# Patient Record
Sex: MALE | Race: WHITE | NOT HISPANIC OR LATINO | Employment: OTHER | URBAN - METROPOLITAN AREA
[De-identification: names, ages, dates, MRNs, and addresses within clinical notes are randomized per-mention and may not be internally consistent; named-entity substitution may affect disease eponyms.]

---

## 2023-04-03 LAB — PROSTATE SPECIFIC AG (NG/ML) IN SER/PLAS: <0.1 NG/ML (ref 0–4)

## 2023-06-21 ENCOUNTER — OFFICE VISIT (OUTPATIENT)
Dept: PRIMARY CARE | Facility: CLINIC | Age: 83
End: 2023-06-21
Payer: MEDICARE

## 2023-06-21 VITALS
SYSTOLIC BLOOD PRESSURE: 116 MMHG | BODY MASS INDEX: 22.73 KG/M2 | HEART RATE: 52 BPM | DIASTOLIC BLOOD PRESSURE: 68 MMHG | WEIGHT: 163 LBS

## 2023-06-21 DIAGNOSIS — I48.91 ATRIAL FIBRILLATION, UNSPECIFIED TYPE (MULTI): ICD-10-CM

## 2023-06-21 DIAGNOSIS — E85.4 CARDIAC AMYLOIDOSIS (MULTI): ICD-10-CM

## 2023-06-21 DIAGNOSIS — C61 PROSTATE CANCER (MULTI): ICD-10-CM

## 2023-06-21 DIAGNOSIS — D47.2 MGUS (MONOCLONAL GAMMOPATHY OF UNKNOWN SIGNIFICANCE): ICD-10-CM

## 2023-06-21 DIAGNOSIS — I43 CARDIAC AMYLOIDOSIS (MULTI): ICD-10-CM

## 2023-06-21 DIAGNOSIS — G56.00 CARPAL TUNNEL SYNDROME, UNSPECIFIED LATERALITY: ICD-10-CM

## 2023-06-21 DIAGNOSIS — E78.2 MIXED HYPERLIPIDEMIA: ICD-10-CM

## 2023-06-21 DIAGNOSIS — Z12.11 SCREENING FOR COLON CANCER: ICD-10-CM

## 2023-06-21 DIAGNOSIS — L97.909 VENOUS STASIS ULCER WITH VARICOSE VEINS OF LOWER EXTREMITY (MULTI): Primary | ICD-10-CM

## 2023-06-21 DIAGNOSIS — I83.009 VENOUS STASIS ULCER WITH VARICOSE VEINS OF LOWER EXTREMITY (MULTI): Primary | ICD-10-CM

## 2023-06-21 PROBLEM — N40.0 BENIGN ENLARGEMENT OF PROSTATE: Status: ACTIVE | Noted: 2023-06-21

## 2023-06-21 PROBLEM — E78.5 HYPERLIPEMIA: Status: ACTIVE | Noted: 2023-06-21

## 2023-06-21 PROCEDURE — 1160F RVW MEDS BY RX/DR IN RCRD: CPT | Performed by: INTERNAL MEDICINE

## 2023-06-21 PROCEDURE — 1170F FXNL STATUS ASSESSED: CPT | Performed by: INTERNAL MEDICINE

## 2023-06-21 PROCEDURE — G0439 PPPS, SUBSEQ VISIT: HCPCS | Performed by: INTERNAL MEDICINE

## 2023-06-21 PROCEDURE — 99214 OFFICE O/P EST MOD 30 MIN: CPT | Performed by: INTERNAL MEDICINE

## 2023-06-21 PROCEDURE — 1036F TOBACCO NON-USER: CPT | Performed by: INTERNAL MEDICINE

## 2023-06-21 PROCEDURE — 1159F MED LIST DOCD IN RCRD: CPT | Performed by: INTERNAL MEDICINE

## 2023-06-21 RX ORDER — TORSEMIDE 20 MG/1
10 TABLET ORAL
COMMUNITY
Start: 2023-05-15

## 2023-06-21 RX ORDER — SPIRONOLACTONE 25 MG/1
25 TABLET ORAL
COMMUNITY
Start: 2023-04-25

## 2023-06-21 RX ORDER — DOFETILIDE 0.12 MG/1
3 CAPSULE ORAL EVERY 12 HOURS
COMMUNITY
Start: 2021-06-22 | End: 2023-11-15 | Stop reason: ALTCHOICE

## 2023-06-21 ASSESSMENT — ENCOUNTER SYMPTOMS
NAUSEA: 0
SINUS PAIN: 0
FACIAL SWELLING: 0
WHEEZING: 0
SORE THROAT: 0
COUGH: 0
FLANK PAIN: 0
DIARRHEA: 0
NECK STIFFNESS: 0
HEADACHES: 0
ABDOMINAL DISTENTION: 0
SEIZURES: 0
CONSTIPATION: 0
TREMORS: 0
LIGHT-HEADEDNESS: 0
ANAL BLEEDING: 0
EYE ITCHING: 0
NUMBNESS: 0
COLOR CHANGE: 0
BACK PAIN: 0
PALPITATIONS: 0
VOMITING: 0
RHINORRHEA: 0
MYALGIAS: 0
WEAKNESS: 0
DIFFICULTY URINATING: 0
SPEECH DIFFICULTY: 0
FREQUENCY: 0
FACIAL ASYMMETRY: 0
SINUS PRESSURE: 0
BRUISES/BLEEDS EASILY: 1
ARTHRALGIAS: 0
PHOTOPHOBIA: 0
POLYPHAGIA: 0
ACTIVITY CHANGE: 0
BLOOD IN STOOL: 0
WOUND: 0
SLEEP DISTURBANCE: 0
EYE DISCHARGE: 0
TROUBLE SWALLOWING: 0
APPETITE CHANGE: 0
POLYDIPSIA: 0
HEMATURIA: 0
STRIDOR: 0
DYSURIA: 0
CHILLS: 0
JOINT SWELLING: 0
FATIGUE: 0
DIAPHORESIS: 0
RECTAL PAIN: 0
CHOKING: 0
ADENOPATHY: 0
ABDOMINAL PAIN: 0
VOICE CHANGE: 0
NECK PAIN: 0
EYE PAIN: 0
EYE REDNESS: 0
SHORTNESS OF BREATH: 1
DIZZINESS: 0
CHEST TIGHTNESS: 0

## 2023-06-21 ASSESSMENT — PATIENT HEALTH QUESTIONNAIRE - PHQ9
2. FEELING DOWN, DEPRESSED OR HOPELESS: NOT AT ALL
SUM OF ALL RESPONSES TO PHQ9 QUESTIONS 1 AND 2: 0
1. LITTLE INTEREST OR PLEASURE IN DOING THINGS: NOT AT ALL

## 2023-06-21 ASSESSMENT — ACTIVITIES OF DAILY LIVING (ADL)
GROCERY_SHOPPING: INDEPENDENT
MANAGING_FINANCES: INDEPENDENT
BATHING: INDEPENDENT
DOING_HOUSEWORK: INDEPENDENT
DRESSING: INDEPENDENT
TAKING_MEDICATION: INDEPENDENT

## 2023-06-21 NOTE — PROGRESS NOTES
Subjective   Patient ID: Kerwin Stark is a 82 y.o. male who presents for Medicare Annual Wellness Visit Subsequent.    Patient presents for wellness exam and follow-up.  He has been compliant with his medications, diet and exercise.  He is following with [facility] for amyloid.  He was started on diuretics for some lower extremity swelling.  He denies any headaches, no dizziness, no sinus problems, no chest pain but reports occasional dyspnea on exertion.  Denies abdominal pain no nausea vomiting or diarrhea.  He does complain of easy bruising.  He has had some mild lower extremity swelling.         Review of Systems   Constitutional:  Negative for activity change, appetite change, chills, diaphoresis and fatigue.   HENT:  Negative for congestion, dental problem, drooling, ear discharge, ear pain, facial swelling, hearing loss, mouth sores, nosebleeds, postnasal drip, rhinorrhea, sinus pressure, sinus pain, sneezing, sore throat, tinnitus, trouble swallowing and voice change.    Eyes:  Negative for photophobia, pain, discharge, redness, itching and visual disturbance.   Respiratory:  Positive for shortness of breath. Negative for cough, choking, chest tightness, wheezing and stridor.    Cardiovascular:  Negative for chest pain, palpitations and leg swelling.   Gastrointestinal:  Negative for abdominal distention, abdominal pain, anal bleeding, blood in stool, constipation, diarrhea, nausea, rectal pain and vomiting.   Endocrine: Negative for cold intolerance, heat intolerance, polydipsia, polyphagia and polyuria.   Genitourinary:  Negative for decreased urine volume, difficulty urinating, dysuria, enuresis, flank pain, frequency, genital sores, hematuria and urgency.   Musculoskeletal:  Negative for arthralgias, back pain, gait problem, joint swelling, myalgias, neck pain and neck stiffness.   Skin:  Negative for color change, pallor, rash and wound.   Neurological:  Negative for dizziness, tremors, seizures,  syncope, facial asymmetry, speech difficulty, weakness, light-headedness, numbness and headaches.   Hematological:  Negative for adenopathy. Bruises/bleeds easily.   Psychiatric/Behavioral:  Negative for sleep disturbance.        Objective   /68   Pulse 52   Wt 73.9 kg (163 lb)   BMI 22.73 kg/m²     Physical Exam  Constitutional:       Appearance: Normal appearance.   Cardiovascular:      Rate and Rhythm: Normal rate and regular rhythm.      Heart sounds: No murmur heard.     No gallop.   Pulmonary:      Effort: No respiratory distress.      Breath sounds: No wheezing or rales.   Abdominal:      General: There is no distension.      Palpations: There is no mass.      Tenderness: There is no abdominal tenderness. There is no guarding.   Musculoskeletal:      Right lower leg: No edema.      Left lower leg: No edema.      Comments: Varicose veins   Neurological:      Mental Status: He is alert.         Assessment/Plan   Diagnoses and all orders for this visit:  Venous stasis ulcer with varicose veins of lower extremity (CMS/HCC)-stable symptoms  Prostate cancer (CMS/HCC)-follow-up with urology  Screening for colon cancer  -     Cologuard® colon cancer screening; Future  MGUS (monoclonal gammopathy of unknown significance)-follow-up with hematology  Mixed hyperlipidemia--lipid profile at next visit  Atrial fibrillation, unspecified type (CMS/HCC)--rate is controlled.  We will continue with anticoagulation  Carpal tunnel syndrome, unspecified laterality  Cardiac amyloidosis (CMS/HCC)-follow-up with cardiology at [facility].  Health maintenance--he does not want a colonoscopy.  We will send Cologuard.  Immunizations are up-to-date.  Ophthalmology and dental have been done

## 2023-07-10 LAB — NONINV COLON CA DNA+OCC BLD SCRN STL QL: NEGATIVE

## 2023-07-13 DIAGNOSIS — I43 CARDIAC AMYLOIDOSIS (MULTI): ICD-10-CM

## 2023-07-13 DIAGNOSIS — E85.4 CARDIAC AMYLOIDOSIS (MULTI): ICD-10-CM

## 2023-07-18 RX ORDER — RIVAROXABAN 20 MG/1
TABLET, FILM COATED ORAL
Qty: 90 TABLET | Refills: 3 | Status: SHIPPED | OUTPATIENT
Start: 2023-07-18 | End: 2024-04-01

## 2023-07-24 ENCOUNTER — PATIENT OUTREACH (OUTPATIENT)
Dept: CARE COORDINATION | Facility: CLINIC | Age: 83
End: 2023-07-24
Payer: MEDICARE

## 2023-07-24 NOTE — PROGRESS NOTES
Spoke with patient. Patient identified by name and .     Asked patient [facility] patient experience questions. Patient states there were no concerns and that he was very pleased with his visit. All areas were addressed. Patient had no concerns.     Patient has follow up scheduled. He asked to verifying the visit type is correct. I informed patient I will check with MA and notify him either way.

## 2023-08-18 ENCOUNTER — APPOINTMENT (OUTPATIENT)
Dept: PRIMARY CARE | Facility: CLINIC | Age: 83
End: 2023-08-18
Payer: MEDICARE

## 2023-08-30 ENCOUNTER — OFFICE VISIT (OUTPATIENT)
Dept: PRIMARY CARE | Facility: CLINIC | Age: 83
End: 2023-08-30
Payer: MEDICARE

## 2023-08-30 VITALS
DIASTOLIC BLOOD PRESSURE: 74 MMHG | SYSTOLIC BLOOD PRESSURE: 122 MMHG | HEART RATE: 52 BPM | BODY MASS INDEX: 22.94 KG/M2 | WEIGHT: 164.5 LBS

## 2023-08-30 DIAGNOSIS — I43 CARDIAC AMYLOIDOSIS (MULTI): ICD-10-CM

## 2023-08-30 DIAGNOSIS — R35.0 BENIGN PROSTATIC HYPERPLASIA WITH URINARY FREQUENCY: ICD-10-CM

## 2023-08-30 DIAGNOSIS — R05.9 COUGH, UNSPECIFIED TYPE: ICD-10-CM

## 2023-08-30 DIAGNOSIS — E85.4 CARDIAC AMYLOIDOSIS (MULTI): ICD-10-CM

## 2023-08-30 DIAGNOSIS — C61 PROSTATE CANCER (MULTI): ICD-10-CM

## 2023-08-30 DIAGNOSIS — E78.2 MIXED HYPERLIPIDEMIA: Primary | ICD-10-CM

## 2023-08-30 DIAGNOSIS — D47.2 MGUS (MONOCLONAL GAMMOPATHY OF UNKNOWN SIGNIFICANCE): ICD-10-CM

## 2023-08-30 DIAGNOSIS — I48.91 ATRIAL FIBRILLATION, UNSPECIFIED TYPE (MULTI): ICD-10-CM

## 2023-08-30 DIAGNOSIS — N40.1 BENIGN PROSTATIC HYPERPLASIA WITH URINARY FREQUENCY: ICD-10-CM

## 2023-08-30 PROCEDURE — 1160F RVW MEDS BY RX/DR IN RCRD: CPT | Performed by: INTERNAL MEDICINE

## 2023-08-30 PROCEDURE — 1159F MED LIST DOCD IN RCRD: CPT | Performed by: INTERNAL MEDICINE

## 2023-08-30 PROCEDURE — 1036F TOBACCO NON-USER: CPT | Performed by: INTERNAL MEDICINE

## 2023-08-30 PROCEDURE — 99397 PER PM REEVAL EST PAT 65+ YR: CPT | Performed by: INTERNAL MEDICINE

## 2023-08-30 PROCEDURE — 1126F AMNT PAIN NOTED NONE PRSNT: CPT | Performed by: INTERNAL MEDICINE

## 2023-08-30 SDOH — HEALTH STABILITY: PHYSICAL HEALTH: ON AVERAGE, HOW MANY DAYS PER WEEK DO YOU ENGAGE IN MODERATE TO STRENUOUS EXERCISE (LIKE A BRISK WALK)?: 7 DAYS

## 2023-08-30 SDOH — HEALTH STABILITY: PHYSICAL HEALTH: ON AVERAGE, HOW MANY MINUTES DO YOU ENGAGE IN EXERCISE AT THIS LEVEL?: 60 MIN

## 2023-08-30 ASSESSMENT — ENCOUNTER SYMPTOMS
BRUISES/BLEEDS EASILY: 0
TREMORS: 0
VOMITING: 0
NECK PAIN: 0
RECTAL PAIN: 0
DIZZINESS: 0
FLANK PAIN: 0
MYALGIAS: 0
WHEEZING: 0
EYE ITCHING: 0
NUMBNESS: 0
PALPITATIONS: 0
JOINT SWELLING: 0
NAUSEA: 0
FREQUENCY: 1
DIARRHEA: 0
HEMATURIA: 0
COLOR CHANGE: 0
DIAPHORESIS: 0
CONSTIPATION: 0
TROUBLE SWALLOWING: 0
CHILLS: 0
SHORTNESS OF BREATH: 0
WOUND: 0
COUGH: 1
ADENOPATHY: 0
APPETITE CHANGE: 0
VOICE CHANGE: 0
STRIDOR: 1
ANAL BLEEDING: 0
EYE DISCHARGE: 0
ROS SKIN COMMENTS: BRUISING
SINUS PAIN: 0
EYE REDNESS: 0
FATIGUE: 0
SLEEP DISTURBANCE: 0
ABDOMINAL DISTENTION: 0
BLOOD IN STOOL: 0
BACK PAIN: 0
SINUS PRESSURE: 0
DYSURIA: 0
HEADACHES: 0
SEIZURES: 0
POLYDIPSIA: 0
LIGHT-HEADEDNESS: 0
ABDOMINAL PAIN: 0
ARTHRALGIAS: 0
PHOTOPHOBIA: 0
NECK STIFFNESS: 0
DIFFICULTY URINATING: 0
EYE PAIN: 0
CHOKING: 0
CHEST TIGHTNESS: 0
FACIAL SWELLING: 0
POLYPHAGIA: 0
WEAKNESS: 0
FACIAL ASYMMETRY: 0
SORE THROAT: 0
SPEECH DIFFICULTY: 0
RHINORRHEA: 0

## 2023-08-30 ASSESSMENT — LIFESTYLE VARIABLES
AUDIT-C TOTAL SCORE: 0
HOW OFTEN DO YOU HAVE A DRINK CONTAINING ALCOHOL: NEVER
SKIP TO QUESTIONS 9-10: 1
HOW OFTEN DO YOU HAVE SIX OR MORE DRINKS ON ONE OCCASION: NEVER
HOW MANY STANDARD DRINKS CONTAINING ALCOHOL DO YOU HAVE ON A TYPICAL DAY: PATIENT DOES NOT DRINK

## 2023-08-30 ASSESSMENT — PATIENT HEALTH QUESTIONNAIRE - PHQ9
SUM OF ALL RESPONSES TO PHQ9 QUESTIONS 1 AND 2: 0
2. FEELING DOWN, DEPRESSED OR HOPELESS: NOT AT ALL
1. LITTLE INTEREST OR PLEASURE IN DOING THINGS: NOT AT ALL

## 2023-08-30 NOTE — PATIENT INSTRUCTIONS
Please take medication as prescribed.  Follow-up in 6 months.  Schedule appointment with urology and hematology.  Obtain a chest x-ray.

## 2023-08-30 NOTE — PROGRESS NOTES
Subjective   Patient ID: Kerwin Stark is a 83 y.o. male who presents for PHYSICAL.    Patient presents for physical exam.  He has been compliant with his medications, diet and exercise.  He overall feels great.  His energy level is improved.  His dyspnea improved.  He is able to exercise and play golf without much difficulty.  He has been complaining of a nonproductive cough over the past few weeks.  Denies any fever or chills.  He denies any headaches, no dizziness, no sinus problems, no chest pain or palpitations.  He denies abdominal pain no nausea vomiting or diarrhea.  He continues to complain of left hand and left elbow pain.  There have been no more falls.  He continues to complain of easy bruising.         Review of Systems   Constitutional:  Negative for appetite change, chills, diaphoresis and fatigue.   HENT:  Negative for congestion, dental problem, drooling, ear discharge, ear pain, facial swelling, hearing loss, mouth sores, nosebleeds, postnasal drip, rhinorrhea, sinus pressure, sinus pain, sneezing, sore throat, tinnitus, trouble swallowing and voice change.    Eyes:  Negative for photophobia, pain, discharge, redness, itching and visual disturbance.   Respiratory:  Positive for cough and stridor. Negative for choking, chest tightness, shortness of breath and wheezing.    Cardiovascular:  Negative for chest pain, palpitations and leg swelling.   Gastrointestinal:  Negative for abdominal distention, abdominal pain, anal bleeding, blood in stool, constipation, diarrhea, nausea, rectal pain and vomiting.   Endocrine: Negative for cold intolerance, heat intolerance, polydipsia, polyphagia and polyuria.   Genitourinary:  Positive for frequency. Negative for decreased urine volume, difficulty urinating, dysuria, enuresis, flank pain, genital sores, hematuria and urgency.   Musculoskeletal:  Negative for arthralgias (Elbow pain,hand pain), back pain, gait problem, joint swelling, myalgias, neck pain and  neck stiffness.   Skin:  Negative for color change, pallor, rash and wound.        Bruising   Neurological:  Negative for dizziness, tremors, seizures, syncope, facial asymmetry, speech difficulty, weakness, light-headedness, numbness and headaches.   Hematological:  Negative for adenopathy. Does not bruise/bleed easily.   Psychiatric/Behavioral:  Negative for sleep disturbance.        Objective   Wt 74.6 kg (164 lb 8 oz)   BMI 22.94 kg/m²     Physical Exam  Constitutional:       General: He is not in acute distress.     Appearance: Normal appearance. He is normal weight. He is not ill-appearing, toxic-appearing or diaphoretic.   HENT:      Head: Normocephalic and atraumatic.      Right Ear: Tympanic membrane, ear canal and external ear normal. There is no impacted cerumen.      Left Ear: Tympanic membrane, ear canal and external ear normal. There is no impacted cerumen.      Nose: Nose normal. No congestion or rhinorrhea.      Mouth/Throat:      Pharynx: No oropharyngeal exudate or posterior oropharyngeal erythema.   Eyes:      General: No scleral icterus.        Right eye: No discharge.         Left eye: No discharge.   Neck:      Vascular: No carotid bruit.   Cardiovascular:      Rate and Rhythm: Normal rate and regular rhythm.      Pulses: Normal pulses.      Heart sounds: Normal heart sounds. No murmur heard.     No friction rub. No gallop.   Pulmonary:      Effort: No respiratory distress.      Breath sounds: No stridor. No wheezing, rhonchi or rales.   Chest:      Chest wall: No tenderness.   Abdominal:      General: There is no distension.      Palpations: There is no mass.      Tenderness: There is no abdominal tenderness. There is no right CVA tenderness, left CVA tenderness or guarding.      Hernia: No hernia is present.   Genitourinary:     Penis: Normal.       Testes: Normal.   Musculoskeletal:         General: No swelling, tenderness, deformity or signs of injury.      Cervical back: Normal range of  motion and neck supple. No rigidity or tenderness.      Right lower leg: No edema.      Left lower leg: No edema.   Lymphadenopathy:      Cervical: No cervical adenopathy.   Skin:     Coloration: Skin is not jaundiced or pale.      Findings: No bruising, erythema, lesion or rash.   Neurological:      Mental Status: He is alert.      Cranial Nerves: No cranial nerve deficit.      Sensory: No sensory deficit.      Motor: No weakness.      Coordination: Coordination normal.      Gait: Gait normal.      Deep Tendon Reflexes: Reflexes normal.     Varicose veins present. .  Onychomycosis present    Assessment/Plan   Diagnoses and all orders for this visit:  Mixed hyperlipidemia-we will obtain previous cholesterol level from [facility]  Cardiac amyloidosis (CMS/HCC)-follow-up at [facility] for investigative studies.  Atrial fibrillation, unspecified type (CMS/HCC)-we will continue with anticoagulation.  Rate is controlled  Benign prostatic hyperplasia with urinary frequency-symptoms of been stable.  Prostate cancer (CMS/HCC)-he will schedule follow-up appointment with urology  MGUS (monoclonal gammopathy of unknown significance)-he will schedule appointment with hematology  Cough, unspecified type-we will check a chest x-ray  -     XR chest 2 views; Future  Health maintenance- Cologuard has been done.  Immunizations are up-to-date.  Dental has been done.  Dermatology appointment has been done

## 2023-08-30 NOTE — PROGRESS NOTES
Subjective   Patient ID: Kerwin Stark is a 83 y.o. male who presents for PHYSICAL.    HPI     Review of Systems    Objective   Wt 74.6 kg (164 lb 8 oz)   BMI 22.94 kg/m²     Physical Exam    Assessment/Plan

## 2023-10-02 ENCOUNTER — TELEPHONE (OUTPATIENT)
Dept: VASCULAR MEDICINE | Facility: CLINIC | Age: 83
End: 2023-10-02
Payer: MEDICARE

## 2023-10-06 PROBLEM — D48.5 BASAL CELL NEOPLASM: Status: ACTIVE | Noted: 2023-10-06

## 2023-10-06 PROBLEM — D48.5 NEOPLASM OF UNCERTAIN BEHAVIOR OF SKIN: Status: ACTIVE | Noted: 2023-07-12

## 2023-10-06 PROBLEM — C44.612 BASAL CELL CARCINOMA OF SKIN OF RIGHT UPPER LIMB, INCLUDING SHOULDER: Status: ACTIVE | Noted: 2023-07-12

## 2023-10-06 PROBLEM — M65.322 TRIGGER INDEX FINGER OF LEFT HAND: Status: ACTIVE | Noted: 2023-10-06

## 2023-10-06 PROBLEM — R35.0 URINARY FREQUENCY: Status: ACTIVE | Noted: 2023-10-06

## 2023-10-06 PROBLEM — M77.11 LATERAL EPICONDYLITIS OF RIGHT ELBOW: Status: ACTIVE | Noted: 2023-10-06

## 2023-10-06 PROBLEM — M79.643 HAND PAIN: Status: ACTIVE | Noted: 2023-10-06

## 2023-10-06 PROBLEM — D22.5 MELANOCYTIC NEVI OF TRUNK: Status: ACTIVE | Noted: 2023-07-12

## 2023-10-06 PROBLEM — L90.5 SCAR CONDITION AND FIBROSIS OF SKIN: Status: ACTIVE | Noted: 2023-07-12

## 2023-10-06 PROBLEM — L60.9 NAIL DISORDER, UNSPECIFIED: Status: ACTIVE | Noted: 2023-07-12

## 2023-10-06 PROBLEM — M19.042 ARTHRITIS OF HAND, LEFT, DEGENERATIVE: Status: ACTIVE | Noted: 2023-10-06

## 2023-10-06 PROBLEM — I47.29 VENTRICULAR TACHYCARDIA, NON-SUSTAINED (MULTI): Status: ACTIVE | Noted: 2023-10-06

## 2023-10-06 PROBLEM — R06.00 DYSPNEA: Status: ACTIVE | Noted: 2021-03-08

## 2023-10-06 PROBLEM — H61.23 IMPACTED CERUMEN OF BOTH EARS: Status: ACTIVE | Noted: 2023-10-06

## 2023-10-06 PROBLEM — D18.01 HEMANGIOMA OF SKIN AND SUBCUTANEOUS TISSUE: Status: ACTIVE | Noted: 2023-07-12

## 2023-10-06 PROBLEM — I83.10 VARICOSE VEINS OF UNSPECIFIED LOWER EXTREMITY WITH INFLAMMATION: Status: ACTIVE | Noted: 2023-07-12

## 2023-10-06 PROBLEM — S52.022A: Status: ACTIVE | Noted: 2023-10-06

## 2023-10-06 PROBLEM — L57.0 ACTINIC KERATOSIS: Status: ACTIVE | Noted: 2023-07-12

## 2023-10-06 PROBLEM — D04.39 CARCINOMA IN SITU OF SKIN OF OTHER PARTS OF FACE: Status: ACTIVE | Noted: 2023-07-12

## 2023-10-06 PROBLEM — N52.9 MALE ERECTILE DISORDER: Status: ACTIVE | Noted: 2023-10-06

## 2023-10-06 PROBLEM — I50.30: Status: ACTIVE | Noted: 2023-10-06

## 2023-10-06 PROBLEM — D22.62 MELANOCYTIC NEVI OF LEFT UPPER LIMB, INCLUDING SHOULDER: Status: ACTIVE | Noted: 2023-07-12

## 2023-10-06 PROBLEM — E87.5 HYPERKALEMIA: Status: ACTIVE | Noted: 2023-10-06

## 2023-10-06 PROBLEM — L81.4 OTHER MELANIN HYPERPIGMENTATION: Status: ACTIVE | Noted: 2023-07-12

## 2023-10-06 PROBLEM — M25.622 STIFFNESS OF LEFT ELBOW, NOT ELSEWHERE CLASSIFIED: Status: ACTIVE | Noted: 2023-10-06

## 2023-10-06 PROBLEM — C44.329 SQUAMOUS CELL CARCINOMA OF SKIN OF OTHER PARTS OF FACE: Status: ACTIVE | Noted: 2023-07-12

## 2023-10-06 PROBLEM — M65.30 TRIGGER FINGER, ACQUIRED: Status: ACTIVE | Noted: 2023-10-06

## 2023-10-06 PROBLEM — L57.9 SKIN CHANGES DUE TO CHRONIC EXPOSURE TO NONIONIZING RADIATION, UNSPECIFIED: Status: ACTIVE | Noted: 2023-07-12

## 2023-10-06 PROBLEM — S60.012A CONTUSION OF LEFT THUMB WITHOUT DAMAGE TO NAIL: Status: ACTIVE | Noted: 2023-07-12

## 2023-10-06 PROBLEM — H61.21 IMPACTED CERUMEN OF RIGHT EAR: Status: ACTIVE | Noted: 2023-10-06

## 2023-10-06 PROBLEM — S63.509A WRIST SPRAIN: Status: ACTIVE | Noted: 2023-10-06

## 2023-10-06 PROBLEM — R53.81 MALAISE: Status: ACTIVE | Noted: 2023-10-06

## 2023-10-06 PROBLEM — H92.03 OTALGIA, BILATERAL: Status: ACTIVE | Noted: 2023-10-06

## 2023-10-06 PROBLEM — M25.569 KNEE PAIN: Status: ACTIVE | Noted: 2023-10-06

## 2023-10-06 PROBLEM — C44.519 BASAL CELL CARCINOMA OF SKIN OF OTHER PART OF TRUNK: Status: ACTIVE | Noted: 2023-07-12

## 2023-10-06 PROBLEM — L82.1 OTHER SEBORRHEIC KERATOSIS: Status: ACTIVE | Noted: 2023-07-12

## 2023-10-06 PROBLEM — I83.93 ASYMPTOMATIC VARICOSE VEINS OF BILATERAL LOWER EXTREMITIES: Status: ACTIVE | Noted: 2023-07-12

## 2023-10-06 PROBLEM — D22.70 MELANOCYTIC NEVI OF UNSPECIFIED LOWER LIMB, INCLUDING HIP: Status: ACTIVE | Noted: 2023-07-12

## 2023-10-06 PROBLEM — R07.9 NONSPECIFIC CHEST PAIN: Status: ACTIVE | Noted: 2023-10-06

## 2023-10-06 PROBLEM — L08.9 INFECTED SEBACEOUS CYST: Status: ACTIVE | Noted: 2023-07-12

## 2023-10-06 PROBLEM — M62.81 MUSCLE WEAKNESS: Status: ACTIVE | Noted: 2023-10-06

## 2023-10-06 PROBLEM — R31.9 HEMATURIA: Status: ACTIVE | Noted: 2023-10-06

## 2023-10-06 PROBLEM — I49.9 CARDIAC ARRHYTHMIA: Status: ACTIVE | Noted: 2023-10-06

## 2023-10-06 PROBLEM — H93.8X9 BLOCKED EAR: Status: ACTIVE | Noted: 2023-10-06

## 2023-10-06 PROBLEM — E85.9 AMYLOIDOSIS (MULTI): Status: ACTIVE | Noted: 2023-10-06

## 2023-10-06 PROBLEM — N42.9 DISORDER OF PROSTATE: Status: ACTIVE | Noted: 2023-10-06

## 2023-10-06 PROBLEM — R33.9 URINARY RETENTION: Status: ACTIVE | Noted: 2023-10-06

## 2023-10-06 PROBLEM — K40.90 LEFT INGUINAL HERNIA: Status: ACTIVE | Noted: 2023-10-06

## 2023-10-06 PROBLEM — Z85.828 PERSONAL HISTORY OF OTHER MALIGNANT NEOPLASM OF SKIN: Status: ACTIVE | Noted: 2023-07-12

## 2023-10-06 PROBLEM — R97.20 ELEVATED PROSTATE SPECIFIC ANTIGEN (PSA): Status: ACTIVE | Noted: 2023-10-06

## 2023-10-06 PROBLEM — L72.3 INFECTED SEBACEOUS CYST: Status: ACTIVE | Noted: 2023-07-12

## 2023-10-06 PROBLEM — R23.3 ABNORMAL BRUISING: Status: ACTIVE | Noted: 2023-10-06

## 2023-10-06 PROBLEM — M25.559 JOINT PAIN, HIP: Status: ACTIVE | Noted: 2023-10-06

## 2023-10-06 PROBLEM — R19.7 DIARRHEA: Status: ACTIVE | Noted: 2023-10-06

## 2023-10-06 PROBLEM — E55.9 VITAMIN D DEFICIENCY: Status: ACTIVE | Noted: 2023-10-06

## 2023-10-06 PROBLEM — D03.62 MELANOMA IN SITU OF LEFT UPPER LIMB, INCLUDING SHOULDER (MULTI): Status: ACTIVE | Noted: 2023-07-12

## 2023-10-06 PROBLEM — T14.8XXA BRUISING: Status: ACTIVE | Noted: 2023-10-06

## 2023-10-06 PROBLEM — S50.02XA CONTUSION OF LEFT ELBOW: Status: ACTIVE | Noted: 2023-10-06

## 2023-10-06 RX ORDER — SODIUM CHLORIDE 0.9 % (FLUSH) 0.9 %
SYRINGE (ML) INJECTION
COMMUNITY
Start: 2023-08-13 | End: 2023-11-15 | Stop reason: WASHOUT

## 2023-10-06 RX ORDER — PHENAZOPYRIDINE HYDROCHLORIDE 200 MG/1
200 TABLET, FILM COATED ORAL 3 TIMES DAILY PRN
COMMUNITY
Start: 2022-12-23 | End: 2023-11-15 | Stop reason: WASHOUT

## 2023-10-06 RX ORDER — OXYCODONE HYDROCHLORIDE 5 MG/1
5 TABLET ORAL EVERY 6 HOURS PRN
COMMUNITY
Start: 2022-11-30 | End: 2023-11-15 | Stop reason: WASHOUT

## 2023-10-06 RX ORDER — FINASTERIDE 5 MG/1
1 TABLET, FILM COATED ORAL DAILY
COMMUNITY
Start: 2022-01-31 | End: 2023-11-15 | Stop reason: ALTCHOICE

## 2023-10-06 RX ORDER — VITAMIN A 3000 MCG
10000 CAPSULE ORAL
COMMUNITY
Start: 2023-06-29

## 2023-10-06 RX ORDER — HYDROCODONE BITARTRATE AND ACETAMINOPHEN 5; 325 MG/1; MG/1
1 TABLET ORAL EVERY 8 HOURS PRN
COMMUNITY
Start: 2022-11-21 | End: 2023-11-15 | Stop reason: WASHOUT

## 2023-10-06 RX ORDER — TAMSULOSIN HYDROCHLORIDE 0.4 MG/1
0.4 CAPSULE ORAL NIGHTLY
COMMUNITY
Start: 2014-02-11 | End: 2023-11-15 | Stop reason: WASHOUT

## 2023-10-11 ENCOUNTER — OFFICE VISIT (OUTPATIENT)
Dept: DERMATOLOGY | Facility: CLINIC | Age: 83
End: 2023-10-11
Payer: MEDICARE

## 2023-10-11 DIAGNOSIS — L90.5 SCAR: ICD-10-CM

## 2023-10-11 DIAGNOSIS — Z12.83 SCREENING EXAM FOR SKIN CANCER: ICD-10-CM

## 2023-10-11 DIAGNOSIS — L81.7 PIGMENTED PURPURIC DERMATOSIS: ICD-10-CM

## 2023-10-11 DIAGNOSIS — Z85.89 HISTORY OF SQUAMOUS CELL CARCINOMA: ICD-10-CM

## 2023-10-11 DIAGNOSIS — D22.9 MULTIPLE BENIGN NEVI: ICD-10-CM

## 2023-10-11 DIAGNOSIS — D48.5 NEOPLASM OF UNCERTAIN BEHAVIOR OF SKIN: ICD-10-CM

## 2023-10-11 DIAGNOSIS — L82.1 SEBORRHEIC KERATOSIS: ICD-10-CM

## 2023-10-11 DIAGNOSIS — Z85.820 PERSONAL HISTORY OF MALIGNANT MELANOMA OF SKIN: ICD-10-CM

## 2023-10-11 DIAGNOSIS — S60.012D CONTUSION OF LEFT THUMB WITHOUT DAMAGE TO NAIL, SUBSEQUENT ENCOUNTER: ICD-10-CM

## 2023-10-11 DIAGNOSIS — L57.8 SUN-DAMAGED SKIN: ICD-10-CM

## 2023-10-11 DIAGNOSIS — C44.619 BASAL CELL CARCINOMA (BCC) OF SKIN OF LEFT UPPER EXTREMITY INCLUDING SHOULDER: Primary | ICD-10-CM

## 2023-10-11 DIAGNOSIS — I83.11 VARICOSE VEINS OF BOTH LOWER EXTREMITIES WITH INFLAMMATION: ICD-10-CM

## 2023-10-11 DIAGNOSIS — L81.4 LENTIGO: ICD-10-CM

## 2023-10-11 DIAGNOSIS — I83.12 VARICOSE VEINS OF BOTH LOWER EXTREMITIES WITH INFLAMMATION: ICD-10-CM

## 2023-10-11 DIAGNOSIS — L57.0 ACTINIC KERATOSIS: ICD-10-CM

## 2023-10-11 PROCEDURE — 1126F AMNT PAIN NOTED NONE PRSNT: CPT | Performed by: DERMATOLOGY

## 2023-10-11 PROCEDURE — 17003 DESTRUCT PREMALG LES 2-14: CPT | Performed by: DERMATOLOGY

## 2023-10-11 PROCEDURE — 1160F RVW MEDS BY RX/DR IN RCRD: CPT | Performed by: DERMATOLOGY

## 2023-10-11 PROCEDURE — 88305 TISSUE EXAM BY PATHOLOGIST: CPT

## 2023-10-11 PROCEDURE — 88305 TISSUE EXAM BY PATHOLOGIST: CPT | Performed by: DERMATOLOGY

## 2023-10-11 PROCEDURE — 1159F MED LIST DOCD IN RCRD: CPT | Performed by: DERMATOLOGY

## 2023-10-11 PROCEDURE — 1036F TOBACCO NON-USER: CPT | Performed by: DERMATOLOGY

## 2023-10-11 PROCEDURE — 17000 DESTRUCT PREMALG LESION: CPT | Performed by: DERMATOLOGY

## 2023-10-11 PROCEDURE — 11301 SHAVE SKIN LESION 0.6-1.0 CM: CPT | Performed by: DERMATOLOGY

## 2023-10-11 PROCEDURE — 99213 OFFICE O/P EST LOW 20 MIN: CPT | Performed by: DERMATOLOGY

## 2023-10-11 NOTE — PATIENT INSTRUCTIONS
Dear Mr. Stark,    It was great seeing you in Dr. Cummins's clinic today.     We did a full body skin exam. We saw one spot on your left wrist that is concerning for skin cancer. We took a biopsy of this spot and we will call you in 1-2 weeks with the results. Keep area dry for 24 hours, after 24 hours can gently cleanse with soap and water. Reapply vaseline and a bandaid daily until healed.     We also saw a few spots on your scalp and right forearm that is concerning for precancerous lesions (actinic keratoses) we treated those with freezing (liquid nitrogen) to prevent them from turning into skin cancers.     We will see you in 3 months for another full body skin exam.

## 2023-10-11 NOTE — PROGRESS NOTES
Subjective     Kerwin Stark is a 83 y.o. 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     Patient denies any lesions of concern today. At last visit, we were monitoring a lesion on left first fingernail. Patient reports traumatic injury to site, and states that pigmentation is continuing to move distally. Reports daily use of sunscreen or sun protection (long sleeves).     Also history of pigmented pruritic dermatoses of the bilateral lower extremity and bilateral varicose veins with edema. Patient continues to wear compression stockings 5 days per week.     Review of Systems:  No other skin or systemic complaints other than what is documented elsewhere in the note.    The following portions of the chart were reviewed this encounter and updated as appropriate:         Skin Cancer History  History of Melanoma(s)   -Melanoma 1: Year Diagnosed: 2022. October. Location: Left Upper Arm Type: Lentigo Maligna Melanoma in situ Treatment(s): Mohs 1-11-23 Dr Warren [id number] -Melanoma 2: Year Diagnosed: 2023. January. Location: Left dorsal Superior Forearm Treatment(s): Mohs 03/07/2023 Type: Lentigo Maligna Melanoma      History of NMSC(s)/Dysplastic Nevi   -Lesion 1: Nodular Basal Cell Carcinoma. Year Diagnosed: 2017. Location: Left Upper Chest Treatment(s): ED&C.   -Lesion 2: Actinic Keratosis. Year Diagnosed: 2021. October. Location: right temple Treatment(s): LN2 1/13/22 Pathology: [id number]   -Lesion 3: AK with SCCISDeep margins involved. Lateral margin involved. Year Diagnosed: 2021. October. Location: Left inferior cheek Treatment(s): Mohs done by Ángel 1/13/22 Pathology: [id number]   -Lesion 4: Superficial Basal Cell Carcinoma. Lateral margin involved. Year Diagnosed: 2023. January. Location: Right Anterior Medial Shin Treatment(s): Mohs 2/22/23  Pathology:       Specialty Problems          Dermatology Problems    Venous stasis ulcer with varicose veins of lower extremity (CMS/HCC)    Actinic keratosis    Basal cell carcinoma of skin of other part of trunk    Basal cell carcinoma of skin of right upper limb, including shoulder    Carcinoma in situ of skin of other parts of face    Contusion of left thumb without damage to nail    Hemangioma of skin and subcutaneous tissue    Infected sebaceous cyst    Melanocytic nevi of left upper limb, including shoulder    Melanocytic nevi of trunk    Melanocytic nevi of unspecified lower limb, including hip    Melanoma in situ of left upper limb, including shoulder (CMS/HCC)    Nail disorder, unspecified    Neoplasm of uncertain behavior of skin    Other melanin hyperpigmentation    Other seborrheic keratosis    Personal history of other malignant neoplasm of skin    Scar condition and fibrosis of skin    Skin changes due to chronic exposure to nonionizing radiation, unspecified    Squamous cell carcinoma of skin of other parts of face    Basal cell neoplasm    Bruising    Contusion of left elbow        Objective   Well appearing patient in no apparent distress; mood and affect are within normal limits.    A full examination was performed including scalp, head, eyes, ears, nose, lips, neck, chest, axillae, abdomen, back, buttocks, bilateral upper extremities, bilateral lower extremities, hands, feet, fingers, toes, fingernails, and toenails. All findings within normal limits unless otherwise noted below.    Assessment/Plan   1. Neoplasm of uncertain behavior of skin  Left lateral wrist  Pink to red macule with areas of brown pigment                  Shave removal    Lesion length (cm):  0.6  Lesion width (cm):  0.4  Lesion diameter (cm):  0.6  Informed consent: discussed and consent obtained    Timeout: patient name, date of birth, surgical site, and procedure verified    Procedure prep:  Patient was prepped and  draped  Anesthesia: the lesion was anesthetized in a standard fashion    Anesthetic:  1% lidocaine w/ epinephrine 1-100,000 local infiltration  Instrument used: DermaBlade    Hemostasis achieved with: aluminum chloride    Outcome: patient tolerated procedure well    Post-procedure details: sterile dressing applied and wound care instructions given    Dressing type: bandage and petrolatum      Specimen 1 - Dermatopathology- DERM LAB  Differential Diagnosis: BCC vs lichenoid keratosis   Check Margins Yes/No?:  yes  Comments:    Dermpath Lab: Routine Histopathology (formalin-fixed tissue)    2. Actinic keratosis (5)  Left Malar Cheek, Mid Parietal Scalp (3), Right Inferior Mauri of Antihelix  Erythematous macules with gritty scale.    Destr of lesion - Left Malar Cheek, Mid Parietal Scalp, Right Inferior Mauri of Antihelix  Complexity: simple    Destruction method: cryotherapy    Informed consent: discussed and consent obtained    Lesion destroyed using liquid nitrogen: Yes    Cryotherapy cycles:  1  Outcome: patient tolerated procedure well with no complications    Post-procedure details: wound care instructions given      3. Multiple benign nevi  Scattered, uniform and benign-appearing, regular brown melanocytic papules and macules.    - Discussed benign nature and that no treatment is necessary unless it becomes painful or increases in size. Patient opts for clinical monitoring at this time.     4. Seborrheic keratosis  Stuck on verrucous, tan-brown papules and plaques.      - Discussed benign nature and that no treatment is necessary unless it becomes painful or increases in size. Patient opts for clinical monitoring at this time.     5. Lentigo  Scattered tan macules in sun-exposed areas.    - Discussed benign nature and that no treatment is necessary unless it becomes painful or increases in size. Patient opts for clinical monitoring at this time.     6. Scar  Surgical scars are well-healed with no evidence of  recurrence    7. Sun-damaged skin    8. Screening exam for skin cancer    Full body skin exam  -No additional lesions concerning for malignancy on the remainder the skin exam today   -Scars from prior skin treatments were evaluated and no evidence of recurrence.  - The ugly duckling sign was discussed. Monitor for any skin lesions that are different in color, shape, or size than others on body  -Sun protection was discussed. Recommend SPF 30+, hats with brims, sun protective clothing, and avoiding sun exposure between 10 AM and 2 PM whenever possible  -Recommend regular skin exams or sooner if new or changing lesions       Related Procedures  Follow Up In Dermatology - Established Patient    9. Personal history of malignant melanoma of skin  -No visible or palpable evidence of recurrence  -No palpable lymphadenopathy in the cervical, axillary, inguinal, or popliteal fossa.     -Hx of melanoma in situ, lentigo maligna on left upper arm, dx 10/2022 s/p Mohs 1/11/23   -Hx of melanoma in situ, lentigo maligna on left dorsal superior forearm dx in 1/2023 s/p Mohs 3/7/23     10. History of squamous cell carcinoma    11. Contusion of left thumb without damage to nail, subsequent encounter  Round blue/purple macule under left first fingernail.     -Patient reports traumatic injury to site, and pigmentation is continuing to grow out.   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     13. Varicose veins of both lower extremities with inflammation  -Palpable varicose veins noted along the bilateral lower legs. 1+ BLE edema    -Patient was previously evaluated by vascular surgery and recommended to use daily compression stockings  -Continue compression stockings. Increase to daily use.       RTC in 3 months for full body skin exam.

## 2023-10-13 LAB
LABORATORY COMMENT REPORT: NORMAL
PATH REPORT.FINAL DX SPEC: NORMAL
PATH REPORT.GROSS SPEC: NORMAL
PATH REPORT.MICROSCOPIC SPEC OTHER STN: NORMAL
PATH REPORT.RELEVANT HX SPEC: NORMAL
PATH REPORT.TOTAL CANCER: NORMAL

## 2023-10-14 NOTE — ADDENDUM NOTE
Addended by: KIKE SWANN on: 10/14/2023 01:04 PM     Modules accepted: Orders     Problem: Falls - Risk of:  Goal: Will remain free from falls  Description: Will remain free from falls  Outcome: Ongoing   Patient placed on fall Precautions per Prasanna Best Fall Risk Assessment Scale (Score> 45). Fall risk armband on, yellow blanket placed on foot of bed, S.A.F.E. sign or orange light displayed at door. Bed in locked and low position, bed alarm armed and audible, call light and bedside table in reach. Patient acknowledges the need to call before getting out of bed. Q2 monitoring, and toileting performed. Problem: Discharge Planning:  Goal: Discharged to appropriate level of care  Description: Discharged to appropriate level of care  Outcome: Ongoing     Problem: Fluid Volume - Imbalance:  Goal: Will remain free of signs and symptoms of dehydration  Description: Will remain free of signs and symptoms of dehydration  Outcome: Ongoing     Problem: Serum Glucose Level - Abnormal:  Goal: Ability to maintain appropriate glucose levels will improve  Description: Ability to maintain appropriate glucose levels will improve  Outcome: Ongoing   BG monitored hourly and insulin infusion adjusted.

## 2023-10-19 DIAGNOSIS — I43 CARDIAC AMYLOIDOSIS (MULTI): Primary | ICD-10-CM

## 2023-10-19 DIAGNOSIS — E85.4 CARDIAC AMYLOIDOSIS (MULTI): Primary | ICD-10-CM

## 2023-11-15 ENCOUNTER — OFFICE VISIT (OUTPATIENT)
Dept: CARDIOLOGY | Facility: CLINIC | Age: 83
End: 2023-11-15
Payer: MEDICARE

## 2023-11-15 ENCOUNTER — ANCILLARY PROCEDURE (OUTPATIENT)
Dept: RADIOLOGY | Facility: CLINIC | Age: 83
End: 2023-11-15
Payer: MEDICARE

## 2023-11-15 ENCOUNTER — OFFICE VISIT (OUTPATIENT)
Dept: ORTHOPEDIC SURGERY | Facility: CLINIC | Age: 83
End: 2023-11-15
Payer: MEDICARE

## 2023-11-15 VITALS
OXYGEN SATURATION: 98 % | WEIGHT: 167.3 LBS | SYSTOLIC BLOOD PRESSURE: 114 MMHG | HEART RATE: 52 BPM | DIASTOLIC BLOOD PRESSURE: 61 MMHG | BODY MASS INDEX: 23.42 KG/M2 | HEIGHT: 71 IN

## 2023-11-15 DIAGNOSIS — S52.022A: ICD-10-CM

## 2023-11-15 DIAGNOSIS — I47.29 VENTRICULAR TACHYCARDIA, NON-SUSTAINED (MULTI): Primary | ICD-10-CM

## 2023-11-15 DIAGNOSIS — S52.022A: Primary | ICD-10-CM

## 2023-11-15 DIAGNOSIS — M25.522 LEFT ELBOW PAIN: ICD-10-CM

## 2023-11-15 PROCEDURE — 99214 OFFICE O/P EST MOD 30 MIN: CPT | Performed by: NURSE PRACTITIONER

## 2023-11-15 PROCEDURE — 99214 OFFICE O/P EST MOD 30 MIN: CPT | Performed by: ORTHOPAEDIC SURGERY

## 2023-11-15 PROCEDURE — 99214 OFFICE O/P EST MOD 30 MIN: CPT | Mod: 25 | Performed by: ORTHOPAEDIC SURGERY

## 2023-11-15 PROCEDURE — 1126F AMNT PAIN NOTED NONE PRSNT: CPT | Performed by: ORTHOPAEDIC SURGERY

## 2023-11-15 PROCEDURE — 93010 ELECTROCARDIOGRAM REPORT: CPT | Performed by: INTERNAL MEDICINE

## 2023-11-15 PROCEDURE — 93005 ELECTROCARDIOGRAM TRACING: CPT | Performed by: NURSE PRACTITIONER

## 2023-11-15 PROCEDURE — 1160F RVW MEDS BY RX/DR IN RCRD: CPT | Performed by: ORTHOPAEDIC SURGERY

## 2023-11-15 PROCEDURE — 1126F AMNT PAIN NOTED NONE PRSNT: CPT | Performed by: NURSE PRACTITIONER

## 2023-11-15 PROCEDURE — 73080 X-RAY EXAM OF ELBOW: CPT | Mod: LT

## 2023-11-15 PROCEDURE — 1159F MED LIST DOCD IN RCRD: CPT | Performed by: ORTHOPAEDIC SURGERY

## 2023-11-15 PROCEDURE — 73080 X-RAY EXAM OF ELBOW: CPT | Mod: LEFT SIDE | Performed by: RADIOLOGY

## 2023-11-15 PROCEDURE — 1036F TOBACCO NON-USER: CPT | Performed by: ORTHOPAEDIC SURGERY

## 2023-11-15 PROCEDURE — 1159F MED LIST DOCD IN RCRD: CPT | Performed by: NURSE PRACTITIONER

## 2023-11-15 PROCEDURE — 1036F TOBACCO NON-USER: CPT | Performed by: NURSE PRACTITIONER

## 2023-11-15 PROCEDURE — 1160F RVW MEDS BY RX/DR IN RCRD: CPT | Performed by: NURSE PRACTITIONER

## 2023-11-15 ASSESSMENT — ENCOUNTER SYMPTOMS
OCCASIONAL FEELINGS OF UNSTEADINESS: 0
LOSS OF SENSATION IN FEET: 0
DEPRESSION: 0

## 2023-11-15 ASSESSMENT — PATIENT HEALTH QUESTIONNAIRE - PHQ9
2. FEELING DOWN, DEPRESSED OR HOPELESS: NOT AT ALL
1. LITTLE INTEREST OR PLEASURE IN DOING THINGS: NOT AT ALL
SUM OF ALL RESPONSES TO PHQ9 QUESTIONS 1 AND 2: 0

## 2023-11-15 ASSESSMENT — PAIN SCALES - GENERAL: PAINLEVEL: 0-NO PAIN

## 2023-11-15 ASSESSMENT — COLUMBIA-SUICIDE SEVERITY RATING SCALE - C-SSRS
1. IN THE PAST MONTH, HAVE YOU WISHED YOU WERE DEAD OR WISHED YOU COULD GO TO SLEEP AND NOT WAKE UP?: NO
2. HAVE YOU ACTUALLY HAD ANY THOUGHTS OF KILLING YOURSELF?: NO
6. HAVE YOU EVER DONE ANYTHING, STARTED TO DO ANYTHING, OR PREPARED TO DO ANYTHING TO END YOUR LIFE?: NO

## 2023-11-15 NOTE — PROGRESS NOTES
Chief Complaint:   Follow-up 2mo     History Of Present Illness:    Kerwin Stark is a 83 y.o. male with PMH of HLD, BPH, ED, NL EF by Echo 3/2021, and persistent AF s/p AF/AFL ablation (PVI + CTI line) 2/17/21. AF recurred post ablation and pt felt tired w/ occas GOLDBERG but still was still able to golf. Had DCCV 4/27/21   but pt kept going into AT; thus Dr Ospina recommended Tikosyn initiation. Pt admits to GOLDBERG with walking uphill but not on flat ground. OAC is Xarelto.     Underwent Tikosyn loading at [facility] 6//21. Pt arrived from home in controlled AF (not on any AVN blockers). Was started on Tikosyn 500 mcg every 12 hours. QT prolongation was noted post 3rd dose thus Tikosyn was decreased to 250 mcg q12h. Pt underwent DCCV (single shock) resulting in SR then into A Tach with variable block and intermittent SR. Due to shortened QT on lower dose, Tikosyn was increased 1/2 step per Dr Ospina to 375mcg q12h; QTc remained stable. Pt had intermittent rate controlled AT 90bpm and SB/SR as well as intermittent Wenckebach conduction. Pt remained asymptomatic ambulating in [place] at least daily. Repeat DCCV was initially planned for 6/21 but pt had brief SB   with Wenckebach conduction the morning of 6/21 thus DCCV was canceled. Pt was back in controlled ATach 90s and   ambulated in [place], no issues, feels better than prior to admission.      Due to diffuse low voltage on EKG, hx NSVT in recovery during stress test in 2016 (no ischemia at submax HR), and hx carpal tunnel syndrome (though unilateral), pursued eval for amyloidosis as outpt (serum light chains drawn while inpt) and ordered nuclear med PYP scan in [facility] to be done as outpt. Free kappa light chains were elevated at 2.53 (NL to 1.94) with NL free lambda light chains; this is unlikely to represent AL amyloid. Discharged home in rates controlled AT.     7/15/21 1mo s/p tikosyn loading, ECG shows AT 99 bpm (reviewed by Anai). Pt asymptomatic in this and feels much  "better than when in AF. He remained in AT rates 85-95. During exertion rates trending 110-130 per Dr. Ospina we will continue with current regimen.      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  Currently following with HF Dr. Curran and Chucho for management of TTR Amyloid, on Vyndamax. Following with hem/onc Dr. Block for monitoring of blood work as well. ln October 2022 suffered a L elbow fracture and surgery, developed hematuria and has been following with Urology for BPH. Currently these issues have stabilized and he no longer is on BPH medications and no hematuria on Xarelto.     2/2023 Echo showed EF 50-55%, Dr. Del Rio feels he has HFpEF ( and started him on Jardiance)     Follows with [facility] Dr. Leticia Yuan for his amyloidosis now, he is in a study and receiving an injection(? placebo or actual drug). Sees him q 3mo. Also started on torsemide and feeling overall better breathing and no more LE edema.     8/2/23 routine 6mo Tikosyn follow up. He is feeling overall well and continues to walk daily. His ECG however shows SB 45 bpm w/ QTC of 583ms. We admitted him for observation while dc'd the tikoysn. His QTc came down over night and he was dc'd home the following day.   Feeling well at [address] [place] [address] follow up. ECG shows ACC junctional 74 bpm, small voltage, Qtc 475ms.        TODAY 2mo follow up. He has noticed over the past month or so that when he checks his heart rhythm on his wifes apple watch it says \"afib\". The rates with this are not fast. His daily Hrs are ranging high 40s-50s mostly. He is not symptomatic when it says Afib. In general he is feeling well. Capable of doing all his usual actvities including golf and carrying his bag even at times. He occasional notes that when he starts to walk at first or is on an incline he seems to feel a little winded at first then once start walking this subsides. BPs are stable. Denies any cp, sob, palpitations, LH/Dizziness, orthopnea, edema. bleeding.    " "    ROS:  Constitutional: not feeling poorly, no fever and no chills.   ENT: no nose bleeds  Cardiovascular: no chest pain, no tightness or heavy pressure, no shortness of breath, no palpitations, no lower extremity edema and as noted in HPI.   Respiratory: no cough, no shortness of breath during exertion, no PND, no orthopnea.   Gastrointestinal: no nausea, no vomiting, no melena  Genitourinary: no hematuria.   Musculoskeletal: no difficulty walking.   Neurological: no dizziness and no fainting.   Endocrine: no thyroid issues       Last Recorded Vitals:  Vitals:    11/15/23 0908   BP: 114/61   BP Location: Left arm   Patient Position: Sitting   BP Cuff Size: Large adult   Pulse: 52   SpO2: 98%   Weight: 75.9 kg (167 lb 4.8 oz)   Height: 1.803 m (5' 11\")       Social History:  He reports that he has never smoked. He has never used smokeless tobacco. He reports that he does not drink alcohol and does not use drugs.    Family History:  Family History   Problem Relation Name Age of Onset    Ovarian cancer Mother          Allergies:  Patient has no known allergies.    Outpatient Medications:  Current Outpatient Medications   Medication Instructions    beta carotene (VITAMIN A) 10,000 Units, oral, Every 72 hours    empagliflozin (JARDIANCE) 10 mg, oral, Daily RT    spironolactone (ALDACTONE) 25 mg    tafamidis (VYNDAMAX) 61 mg, oral, Daily    torsemide (DEMADEX) 10 mg, oral    Xarelto 20 mg tablet TAKE 1 TABLET BY MOUTH  DAILY       Physical Exam:  Constitutional: alert and in no acute distress.   Eyes: no erythema, swelling or discharge from the eye .   Neck: neck is supple, symmetric, trachea midline, no masses .   Pulmonary: no increased work of breathing or signs of respiratory distress  and lungs clear to auscultation.    Cardiovascular: JVP was normal, no thrills , regular rhythm, normal S1 and S2, no murmurs , pedal pulses 2+ bilaterally  and no edema .   Abdomen: abdomen non-tender, no masses .   Skin: skin warm " and dry, normal skin turgor .   Psychiatric judgment and insight is normal  and oriented to person, place and time .           Last Labs:  CBC -  Lab Results   Component Value Date    WBC 5.7 08/02/2023    HGB 13.2 (L) 08/02/2023    HCT 38.5 (L) 08/02/2023    MCV 90 08/02/2023     08/02/2023       CMP -  Lab Results   Component Value Date    CALCIUM 8.9 08/03/2023    PHOS 2.9 06/19/2021    PROT 6.9 12/27/2022    PROT 6.9 12/27/2022    ALBUMIN 4.1 12/27/2022    AST 17 12/27/2022    ALT 20 12/27/2022    ALKPHOS 118 12/27/2022    BILITOT 0.7 12/27/2022       LIPID PANEL -   Lab Results   Component Value Date    CHOL 164 06/15/2022    TRIG 59 06/15/2022    HDL 72.2 06/15/2022    CHHDL 2.3 06/15/2022    LDLF 80 06/15/2022    VLDL 12 06/15/2022       RENAL FUNCTION PANEL -   Lab Results   Component Value Date    GLUCOSE 86 08/03/2023     08/03/2023    K 4.2 08/03/2023     (H) 08/03/2023    CO2 21 08/03/2023    ANIONGAP 14 08/03/2023    BUN 21 08/03/2023    CREATININE 1.03 08/03/2023    GFRMALE 73 08/03/2023    CALCIUM 8.9 08/03/2023    PHOS 2.9 06/19/2021    ALBUMIN 4.1 12/27/2022            Last Cardiology Tests:  ECG:  ECG 11/15/23 AT 52 bpm   ECG 8/23/23 acc junction 74 bpm, voltage is very small, difficult to tell if actual p waves. QTc 475ms  ECG 8/2/23 SB 45bpm QTC 583ms (reviewed by myself and Dr. Larios)  ECG 1/27/23 AT 85 bpm QT// 502ms (on Tikoysn, reviewed by Anai)  ECG 7/5/22 AT 87 bpm w/ absolute QT 428ms/QTc 515 on Tikosyn 375mcg (reviewed by Dr. Ospina)  ECG 1/14/22 AT 95 bpm (Tikosyn 375) reviewed by [employer]  ECG 7/15/21 AT 99 bpm QTc 513ms (Tikosyn 375) reviewed by [employer]  ECG 6/25/21 AT 94 bpm QTC 505ms (Tikosyn 375) reviewed by [employer]    Echo:  2/2023-CONCLUSIONS:   1. Left ventricular systolic function is low normal with a 50-55% estimated ejection fraction.   2. There is low normal right ventricular systolic function.   3. The left atrium is moderately dilated.    4. The right atrium is severely dilated.   5. Severity MR varies with R-R interval.   6. Moderate mitral valve regurgitation.   7. Mild to moderate tricuspid regurgitation visualized.   8. RVSP within normal limits.   9. The inferior vena cava appears severely dilated.    Echo 3/2021: EF 55-60% (in AF), no WMA, NL RV size/low NL RV fxn, severe   LAE/mod SUSHANT, mild MAC/mild MR, mild TR, rvsp 42.8mm.     .    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      Scores and Scales  [id number]   1. Heart Failure or EF is less than or equal to 35%? No (0 pt)   2. Hypertension? No (0 pt)   3. Age? Greater than or equal to 75 (2 pt)   4. Diabetes? No (0 pt)   5. Stroke, TIA, or Systemic Emboli? No (0 pt)   6. Vascular Disease? No (0 pt)   7. Gender? Male (0 pt)   Total Risk Score: The [id number] Score is 2 which corresponds to High Risk.       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 Follows for his amyloid at [facility] with Dr. Leticia Yuan q 3mo, he is enrolled in a study where he receiving injection medication every month (unclear if placebo or actual med).      23 routine 6mo Tikosyn follow up. ECG showed SB 45 bpm w/ QTC of 583ms. this was a significant change from prior ECGs, also of note he has received two doses of new study medication (unclear if placebo or not). We directly admitted him for observation while dc'd the tikoysn. His QTc came down over night and he was dc'd home the following day. No complications.  2wk follow up- ECG shows ?acc junction 74 bpm , voltage is very small, difficult to tell if actual p waves. QTc 475ms. Feeling well, staying active.      TODAY 2mo follow up. He is feeling overall well  "and capable of doing his usual activities. He did however note that when he checks his heart rhythm on his wifes apple watch it says \"afib\". The rates with this are not fast. His daily Hrs are ranging high 40s-50s mostly. He is not symptomatic when it says Afib. In general he is feeling well. Capable of doing all his usual actvities including golf and carrying his bag even at times. He occasional notes that when he starts to walk at first or is on an incline he seems to feel a little winded at first then once start walking this subsides shortly after. BPs are stable.   ECG in office shows ?AT 52 bpm, Qtc 461(reviewed by Anai)  We will hold the course for now since he seems to be feeling like this is managable and capable of staying active. Will reassess in 3mo or sooner if he feels things have changed.    PLAN  Continue with current medications for now  Follow up in 3mo or sooner if symptoms start to worsen or change       MAYLIN Quintero-CNP  Reviewed with collaborating physician Dr. Ospina  "

## 2023-11-15 NOTE — PROGRESS NOTES
83-year-old male highly active and healthy 1 year out from left olecranon fracture treated with intramedullary fixation.  He is doing well except for some mild tricep pain when doing push-ups.  He is able to use an ab roller and do other activities without pain in his elbow.  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  Otherwise no complaints.    The patient does not endorse fevers and chills. ~He/She~ does not endorse any change in her vision or hearing. ~He/She~ does not endorse chest pain, shortness of breath. ~He/She~ does not endorse any abdominal discomfort. ~He/She~ does not endorse any skin irritation or lesions. ~He/She~ does not endorse any new numbness and tingling or as otherwise stated in the history of present illness.    ~He/She~ is in no acute distress, alert and oriented x 3.    Mood and affect are appropriate.    Respirations are unlabored.    Distal limb is pink and well perfused.    Left upper extremity evaluation demonstrates well-healed surgical incision.  Full and symmetric range of motion compared to the contralateral side without pain.  Mild tenderness palpation of the tip of his leg in a process over his incision.  Sensation is intact to light touch in the axillary, median, ulnar, and radial nerve distributions distally. The hand is warm and well-perfused.    I personally reviewed multiple views of the left elbow the were obtained in the office today demonstrate maintenance of reduction, interval healing, and a stable position of the hardware.    83-year-old highly active male 1 year out from left olecranon process fracture doing very well.  He can continue with full activity at this point with no restriction.  I did discuss with him that removal of the screw and closure of the triceps tendon from the screw insertion may help with some pain when he is doing push-ups which is his only complaints however it would not be a guarantee.  He will consider hardware  removal in the future but no need to schedule today.  Otherwise he can follow-up as needed.    Natural History reviewed. All questions answered. ~He/She~ was in agreement with the plan.      **This note was created using voice recognition software and was not corrected for typographical or grammatical errors.**

## 2023-11-15 NOTE — PATIENT INSTRUCTIONS
It was so nice to see you today!    Your ECG appears to be a slow Atrial tach.? As reviewed by Dr. Ospina.    Please continue with your current medications.  Continue to stay active.    Since you are feeling overall pretty well and functional with this we will hold the course and reassess in a few months to see how you are feeling.    If you notice any worsening symptoms please give us a call and we will see you sooner to address it.

## 2023-11-20 LAB
ATRIAL RATE: 43 BPM
Q ONSET: 223 MS
QRS COUNT: 9 BEATS
QRS DURATION: 74 MS
QT INTERVAL: 496 MS
QTC CALCULATION(BAZETT): 461 MS
QTC FREDERICIA: 472 MS
R AXIS: 18 DEGREES
T AXIS: 45 DEGREES
T OFFSET: 471 MS
VENTRICULAR RATE: 52 BPM

## 2023-11-30 ENCOUNTER — OFFICE VISIT (OUTPATIENT)
Dept: DERMATOLOGY | Facility: CLINIC | Age: 83
End: 2023-11-30
Payer: MEDICARE

## 2023-11-30 DIAGNOSIS — C44.619 BASAL CELL CARCINOMA (BCC) OF SKIN OF LEFT UPPER EXTREMITY INCLUDING SHOULDER: ICD-10-CM

## 2023-11-30 PROCEDURE — 1160F RVW MEDS BY RX/DR IN RCRD: CPT | Performed by: DERMATOLOGY

## 2023-11-30 PROCEDURE — 17313 MOHS 1 STAGE T/A/L: CPT | Performed by: DERMATOLOGY

## 2023-11-30 PROCEDURE — 1159F MED LIST DOCD IN RCRD: CPT | Performed by: DERMATOLOGY

## 2023-11-30 PROCEDURE — 99214 OFFICE O/P EST MOD 30 MIN: CPT | Performed by: DERMATOLOGY

## 2023-11-30 PROCEDURE — 1036F TOBACCO NON-USER: CPT | Performed by: DERMATOLOGY

## 2023-11-30 PROCEDURE — 1126F AMNT PAIN NOTED NONE PRSNT: CPT | Performed by: DERMATOLOGY

## 2023-11-30 NOTE — PROGRESS NOTES
Mohs Surgery Operative Note    Date of Surgery:  11/30/2023  Surgeon:  Rory Warren MD  Office Location: [address]   [address] [address]  Dept: [phone]  Dept Fax: [phone]  Referring Provider: Scott Cummins MD  [id number] [facility]  [place] [address]      Assessment/Plan   Pre-procedure:   Obtained informed consent: written from patient  The surgical site was identified and confirmed with the patient.     Intra-operative:   Audible time out called at : 12:17 PM 11/30/23  by: Nataly Gill LPN   Verified patient name, birthdate, site, specimen bottle label & requisition.    The planned procedure(s) was again reviewed with the patient. The risks of bleeding, infection, nerve damage and scarring were reviewed. Written authorization was obtained. The patient identity, surgical site, and planned procedure(s) were verified. The provider acted as both surgeon and pathologist.     Basal cell carcinoma (BCC) of skin of left upper extremity including shoulder  Left Lateral Wrist    Mohs surgery    Consent obtained: written    Universal Protocol:  Procedure explained and questions answered to patient or proxy's satisfaction: Yes    Test results available and properly labeled: Yes    Pathology report reviewed: Yes    External notes reviewed: Yes    Photo or diagram used for site identification: Yes    Site/side marked: Yes    Slide independently reviewed by Mohs surgeon: Yes    Immediately prior to procedure a time out was called: Yes    Patient identity confirmed: verbally with patient  Preparation: Patient was prepped and draped in usual sterile fashion      Anticoagulation:  Is the patient taking prescription anticoagulant and/or aspirin prescribed/recommended by a physician? Yes    Was the anticoagulation regimen changed prior to Mohs? No      Anesthesia:  Anesthesia method: local infiltration  Local anesthetic: lidocaine 1%  WITH epi (1.5% Xylocaine)    Procedure Details:  Biopsy accession number: [id number]  Date of biopsy: 10/11/2023  Pre-Op diagnosis: basal cell carcinoma  BCC subtype: nodular  Surgery side: left  Surgical site (from skin exam): Left Lateral Wrist  Indications for Mohs surgery: anatomic location where tissue conservation is critical  Previously treated? No    Details of micrographic surgery: The patient was brought into the operating room and placed in the procedure chair in the appropriate position.  The area positive by previous biopsy was identified and confirmed with the patient. The area of clinically obvious tumor was debulked using a curette and/or scalpel as needed. An incision was made following the Mohs approach through the skin. The specimen was taken to the lab, divided into 2 piece(s) and appropriately chromacoded and processed.        Micrographic Surgery Details:  Post-operative length (cm): 1  Post-operative width (cm): 0.8  Number of Mohs stages: 1    Stage 1     Comments:            Tumor features identified on Mohs section: no tumor identified    Depth of defect: dermis    Patient tolerance of procedure: tolerated well, no immediate complications    Reconstruction:  Was the defect reconstructed?: No    Fine/surface layer approximation (top stitches)   Hemostasis achieved with: pressure, Gelfoam and electrodesiccation  Outcome: patient tolerated procedure well with no complications    Post-procedure details: wound care instructions given    Additional details:  Repair: After a discussion with the patient regarding the options for wound closure, a decision was made to proceed with second intention healing.  Dressing F/U: Surgifoam was placed in the wound. A pressure dressing was placed to help stabilize the wound and to minimize the risk of postoperative bleeding. Wound care was discussed, and the patient was given written post-operative wound care instructions.       Staff Communication: Dermatology  Local Anesthesia: Site Location: Left Lateral Wrist 1.5 % Xylocaine / Epinephrine - Amount: 1.0 cc      Repair: After a discussion with the patient regarding the options for wound closure, a decision was made to proceed with second intention healing.      Patient tolerance of procedure: tolerated well, no immediate complications              Wound care was discussed, and the patient was given written post-operative wound care instructions.      The patient will follow up with Rory Warren MD as needed for any post operative problems or concerns, and will follow up with their primary dermatologist as scheduled.

## 2023-11-30 NOTE — PROGRESS NOTES
Office Visit Note  Date: 11/30/2023  Surgeon:  Rory Warren MD  Office Location: [address]  [facility] [address]  Dept: [phone]  Dept Fax: [phone]  Referring Provider: Scott Cummins MD  [id number] [address]  Department of Dermatology  [place] [id number]    Subjective   Kerwni Stark is a 83 y.o. male who presents for the following: MOHS Surgery (Basal Cell Carcinoma - Left Lateral Wrist)    According to the patient, the lesion has been presnt for approximately 6 months at the time of diagnosis.  The lesion is itchy.  The lesion has not been treated previously.    The patient does not have a pacemaker / defibrillator.  The patient does have a heart valve / joint replacement.    The patient is on blood thinners.  The patient does not have a history of hepatitis B or C.  The patient does not have a history of HIV.  The patient does not have a history of immunosuppression (e.g. organ transplantation, malignancy, medications)    Review of Systems:  No other skin or systemic complaints other than what is documented elsewhere in the note.    MEDICAL HISTORY: clinically relevant history including significant past medical history, medications and allergies was reviewed and documented in Epic.    Objective   Well appearing patient in no apparent distress; mood and affect are within normal limits.  Vital signs: See record.  Noted on the   The patient confirmed the identified site.    Discussion:  The nature of the diagnosis was explained. The lesion is a skin cancer.  It has a risk of local growth and distant spread. The condition is associated with sun exposure.  Warning signs of non-melanoma skin cancer discussed. Patient was instructed to perform monthly self skin examination.  We recommended that the patient have regular full skin exams given an increased risk of subsequent skin cancers. The patient was instructed to use sun protective behaviors  including use of broad spectrum sunscreens and sun protective clothing to reduce risk of skin cancers.      Risks, benefits, side effects of Mohs surgery were discussed with patient and the patient voiced understanding.  It was explained that even though the cure rate of Mohs is very high it is not 100%. Risks of surgery including but not limited to bleeding, infection, numbness, nerve damage, and scar were reviewed.  Discussion included wound care requirements, activity restrictions, likely scar outcome and time to heal.     After Mohs surgery, the defect may need to be repaired surgically and the scar may be longer than the original lesion.  Reconstruction options, risks, and benefits were reviewed including second intention healing, linear repair (4-1 ratio was explained), local flaps, skin grafts, cartilage grafts and interpolation flaps (the need for multiple surgeries was explained). Possible outcomes were reviewed including likely scar appearance, failure of flap survival, infection, bleeding and the need for revision surgery.     The pathology was reviewed, the photograph was reviewed, and the referring physician's note was reviewed.    Patient elected for Mohs surgery.    The patient has a basal cell carcinoma.  The pathology was reviewed, the photograph was reviewed, and the referring physicians note was reviewed.  Multiple treatment options including mohs surgery (which has moderate risk of morbidity) were reviewed.    Medical Decision Making  Column 1- Basal Cell Carcinoma (1 acute uncomplicated illness- Low)  Column 2- 3 tests reviewed (pathology, photograph, refering physician notes- Moderate)  Column 3- Modertate risk of morbidity from additional treatment- mohs surgery- Moderate)    Overall Moderate MDM

## 2024-01-02 ENCOUNTER — TELEPHONE (OUTPATIENT)
Dept: PRIMARY CARE | Facility: CLINIC | Age: 84
End: 2024-01-02

## 2024-01-02 ENCOUNTER — ANCILLARY PROCEDURE (OUTPATIENT)
Dept: RADIOLOGY | Facility: CLINIC | Age: 84
End: 2024-01-02
Payer: MEDICARE

## 2024-01-02 DIAGNOSIS — R05.9 COUGH, UNSPECIFIED TYPE: ICD-10-CM

## 2024-01-02 PROCEDURE — 71046 X-RAY EXAM CHEST 2 VIEWS: CPT | Performed by: STUDENT IN AN ORGANIZED HEALTH CARE EDUCATION/TRAINING PROGRAM

## 2024-01-02 PROCEDURE — 71046 X-RAY EXAM CHEST 2 VIEWS: CPT

## 2024-01-04 DIAGNOSIS — J18.9 PNEUMONIA DUE TO INFECTIOUS ORGANISM, UNSPECIFIED LATERALITY, UNSPECIFIED PART OF LUNG: Primary | ICD-10-CM

## 2024-01-04 DIAGNOSIS — R93.89 ABNORMAL CHEST X-RAY: Primary | ICD-10-CM

## 2024-01-04 RX ORDER — AMOXICILLIN AND CLAVULANATE POTASSIUM 875; 125 MG/1; MG/1
875 TABLET, FILM COATED ORAL 2 TIMES DAILY
Qty: 14 TABLET | Refills: 0 | Status: SHIPPED | OUTPATIENT
Start: 2024-01-04 | End: 2024-01-14

## 2024-01-04 RX ORDER — DOXYCYCLINE 100 MG/1
100 CAPSULE ORAL 2 TIMES DAILY
Qty: 14 CAPSULE | Refills: 0 | Status: SHIPPED | OUTPATIENT
Start: 2024-01-04 | End: 2024-01-11

## 2024-01-16 ENCOUNTER — OFFICE VISIT (OUTPATIENT)
Dept: UROLOGY | Facility: CLINIC | Age: 84
End: 2024-01-16
Payer: MEDICARE

## 2024-01-16 ENCOUNTER — LAB (OUTPATIENT)
Dept: LAB | Facility: LAB | Age: 84
End: 2024-01-16
Payer: MEDICARE

## 2024-01-16 VITALS — HEIGHT: 71 IN | TEMPERATURE: 96.8 F | WEIGHT: 165.4 LBS | BODY MASS INDEX: 23.15 KG/M2

## 2024-01-16 DIAGNOSIS — N40.1 BENIGN PROSTATIC HYPERPLASIA WITH URINARY FREQUENCY: Primary | ICD-10-CM

## 2024-01-16 DIAGNOSIS — R35.0 BENIGN PROSTATIC HYPERPLASIA WITH URINARY FREQUENCY: Primary | ICD-10-CM

## 2024-01-16 DIAGNOSIS — N39.3 STRESS INCONTINENCE: ICD-10-CM

## 2024-01-16 DIAGNOSIS — C61 PROSTATE CANCER (MULTI): ICD-10-CM

## 2024-01-16 LAB — PSA SERPL-MCNC: <0.1 NG/ML

## 2024-01-16 PROCEDURE — 1036F TOBACCO NON-USER: CPT | Performed by: UROLOGY

## 2024-01-16 PROCEDURE — 51798 US URINE CAPACITY MEASURE: CPT | Performed by: UROLOGY

## 2024-01-16 PROCEDURE — 1159F MED LIST DOCD IN RCRD: CPT | Performed by: UROLOGY

## 2024-01-16 PROCEDURE — 1126F AMNT PAIN NOTED NONE PRSNT: CPT | Performed by: UROLOGY

## 2024-01-16 PROCEDURE — 1160F RVW MEDS BY RX/DR IN RCRD: CPT | Performed by: UROLOGY

## 2024-01-16 PROCEDURE — 36415 COLL VENOUS BLD VENIPUNCTURE: CPT

## 2024-01-16 PROCEDURE — 99213 OFFICE O/P EST LOW 20 MIN: CPT | Performed by: UROLOGY

## 2024-01-16 PROCEDURE — 84153 ASSAY OF PSA TOTAL: CPT

## 2024-01-16 NOTE — PROGRESS NOTES
"  Patient is a 83 y.o. male status post HoLEP in December 2022   Chief Complaint    Follow-up        Referring physician: No ref. provider found     SUBJECTIVE:  HPI   83-year-old male status post HoLEP in December 2022 with incidentally found [place] 6 prostate cancer.  His last PSA was in April and it was undetectable.  He presents for follow-up.    Patient is doing very well and is satisfied from the urinary standpoint.  He does report small amount of persistent stress urinary incontinence.  He was not able to proceed with pelvic floor physical therapy due to scheduling difficulty.    Today his postvoid residual is 8 mL.  His IPSS is 0 and 0    No results found for: \"KPSAT\", \"KPSAP\"  No results found for: \"UAMICCOMM\"  Microscopic (no units)   Date Value   12/22/2022 MANUAL MICROSCOPIC URINES     No results found for: \"URINECULTURE\"     Past Medical History:   Diagnosis Date    Cough, unspecified     Cough    Disorder of prostate, unspecified     Prostate disorder    Impacted cerumen, bilateral 04/07/2017    Impacted cerumen of both ears    Personal history of other diseases of the musculoskeletal system and connective tissue     History of arthritis    Personal history of other diseases of the nervous system and sense organs     History of cataract    Personal history of other diseases of the nervous system and sense organs 12/27/2013    History of carpal tunnel syndrome    Personal history of other endocrine, nutritional and metabolic disease 06/06/2018    History of vitamin D deficiency    Syncope and collapse 10/09/2013    Vasovagal syncope        Past medical, surgical, family and social history in the chart was reviewed and is accurate including any additions to what is in this HPI.    Review of Systems   Constitutional: denies any unintentional weight loss or change in strength.  Integumentary: denies any rashes or pruritus.  Eyes: denies any double vision or eye pain.  Ear/Nose/Mouth/Throat: denies any " "nosebleeds or gum bleeds.  Cardiovascular: denies any chest pain or syncope.  Respiratory: denies hemoptysis.  Gastrointestinal: denies nausea or vomiting.  Musculoskeletal: denies muscle cramping or weakness.  Neurologic: denies convulsions or seizures.  Hematologic/Lymphatic: denies bleeding tendencies.  Endocrine: denies heat/cold intolerance.  All other systems have been reviewed and are negative unless otherwise noted in the HPI.    OBJECTIVE:  Visit Vitals  Temp 36 °C (96.8 °F) (Temporal)     Physical Exam   Constitutional: No obvious distress.  Eyes: Non-injected conjunctiva, sclera clear, EOMI.  Ears/Nose/Mouth/Throat: No obvious drainage per ears or nose.  Cardiovascular: Extremities are warm and well perfused. No edema, cyanosis or pallor.  Respiratory: No audible wheezing/stridor; respirations do not appear labored.  Gastrointestinal: Abdomen soft, not distended.  Musculoskeletal: Normal ROM of extremities.  Skin: No obvious rashes or open sores.  Neurologic: Alert and oriented, CN 2-12 grossly intact.  Psychiatric: Answers questions appropriately with normal affect.  Hematologic/Lymphatic/Immunologic: No obvious bruises or sites of spontaneous bleeding.  Genitourinary: No CVA tenderness, bladder not palpable.     Labs:  Lab Results   Component Value Date    WBC 5.7 08/02/2023    HGB 13.2 (L) 08/02/2023    HCT 38.5 (L) 08/02/2023     08/02/2023    CHOL 164 06/15/2022    TRIG 59 06/15/2022    HDL 72.2 06/15/2022    ALT 20 12/27/2022    AST 17 12/27/2022     08/03/2023    K 4.2 08/03/2023     (H) 08/03/2023    CREATININE 1.03 08/03/2023    BUN 21 08/03/2023    CO2 21 08/03/2023    TSH 2.08 03/17/2022    PSA <0.10 04/03/2023    INR 1.0 12/22/2022     No results found for: \"KPSAT\", \"KPSAP\"  IASSESSMENT:  Problem List Items Addressed This Visit       Benign enlargement of prostate - Primary    Relevant Orders    Measure post void residual    POCT UA (nonautomated) manually resulted    "   PLAN:  1.  Lower urinary tract symptoms status post holmium laser nucleation of prostate.  Persistent stress incontinence.  Will proceed with referral to pelvic floor physical therapy.  Patient is not significantly bothered by this.    2.  Incidentally found prostate cancer.  Last PSA was undetectable in April.  We will repeat and follow-up virtually.    All questions were answered to the patient’s satisfaction.  Patient agrees with the plan and wishes to proceed.  Follow-up will be scheduled appropriately.     Radha Byers MD

## 2024-01-17 ENCOUNTER — APPOINTMENT (OUTPATIENT)
Dept: DERMATOLOGY | Facility: CLINIC | Age: 84
End: 2024-01-17
Payer: MEDICARE

## 2024-01-23 ENCOUNTER — TELEMEDICINE (OUTPATIENT)
Dept: UROLOGY | Facility: CLINIC | Age: 84
End: 2024-01-23
Payer: MEDICARE

## 2024-01-23 DIAGNOSIS — C61 PROSTATE CANCER (MULTI): Primary | ICD-10-CM

## 2024-01-23 PROCEDURE — 99213 OFFICE O/P EST LOW 20 MIN: CPT | Performed by: UROLOGY

## 2024-01-23 NOTE — PROGRESS NOTES
Subjective   This visit was completed via telemedicine. All issues as below were discussed and addressed but no physical exam was performed unless allowed by visual confirmation. If it was felt that the patient should be evaluated in clinic, then they were directed there. Patient verbally consented to visit.    Kerwin Stark is a 83 y.o. male with history of BPH s/p HoLEP (12/2022) with incidentally found [place] 6 prostate cancer. He presents today for a follow up visit to review PSA. Patient has no new complaints.         Past Medical History:   Diagnosis Date    Cough, unspecified     Cough    Disorder of prostate, unspecified     Prostate disorder    Impacted cerumen, bilateral 04/07/2017    Impacted cerumen of both ears    Personal history of other diseases of the musculoskeletal system and connective tissue     History of arthritis    Personal history of other diseases of the nervous system and sense organs     History of cataract    Personal history of other diseases of the nervous system and sense organs 12/27/2013    History of carpal tunnel syndrome    Personal history of other endocrine, nutritional and metabolic disease 06/06/2018    History of vitamin D deficiency    Syncope and collapse 10/09/2013    Vasovagal syncope     Past Surgical History:   Procedure Laterality Date    CARPAL TUNNEL RELEASE  04/02/2015    Neuroplasty Decompression Median Nerve At Carpal Tunnel    COLONOSCOPY  10/09/2013    Complete Colonoscopy    COLONOSCOPY  03/24/2014    Complete Colonoscopy    COLONOSCOPY  05/2013    rpt 5-23    KNEE ARTHROPLASTY  10/09/2013    Knee Arthroplasty    KNEE SURGERY  04/02/2013    Knee Surgery    KNEE SURGERY  10/11/2013    Knee Surgery    OTHER SURGICAL HISTORY  02/11/2020    Hand surgery    TOTAL KNEE ARTHROPLASTY  03/28/2014    Knee Replacement     Family History   Problem Relation Name Age of Onset    Ovarian cancer Mother       Current Outpatient Medications   Medication Sig Dispense Refill     beta carotene (vitamin A) 3,000 mcg (10,000 unit) capsule Take 1 capsule (10,000 Units) by mouth every 3rd day.      empagliflozin (Jardiance) 10 mg Take 1 tablet (10 mg) by mouth once daily.      spironolactone (Aldactone) 25 mg tablet 1 tablet (25 mg).      tafamidis (Vyndamax) 61 mg capsule Take 1 capsule (61 mg) by mouth once daily. 30 capsule 11    torsemide (Demadex) 20 mg tablet Take 0.5 tablets (10 mg) by mouth.      Xarelto 20 mg tablet TAKE 1 TABLET BY MOUTH  DAILY 90 tablet 3     No current facility-administered medications for this visit.     No Known Allergies  Social History     Socioeconomic History    Marital status:      Spouse name: Not on file    Number of children: 1    Years of education: Not on file    Highest education level: Not on file   Occupational History    Occupation: Retired    Tobacco Use    Smoking status: Never    Smokeless tobacco: Never   Substance and Sexual Activity    Alcohol use: Never    Drug use: Never    Sexual activity: Not on file   Other Topics Concern    Not on file   Social History Narrative    Not on file     Social Determinants of Health     Financial Resource Strain: Not on file   Food Insecurity: Not on file   Transportation Needs: Not on file   Physical Activity: Sufficiently Active (8/30/2023)    Exercise Vital Sign     Days of Exercise per Week: 7 days     Minutes of Exercise per Session: 60 min   Stress: Not on file   Social Connections: Not on file   Intimate Partner Violence: Not on file   Housing Stability: Not on file       Review of Systems  Pertinent items are noted in HPI.    Objective     Lab Review  Lab Results   Component Value Date    WBC 5.7 08/02/2023    RBC 4.30 (L) 08/02/2023    HGB 13.2 (L) 08/02/2023    HCT 38.5 (L) 08/02/2023     08/02/2023      Lab Results   Component Value Date    BUN 21 08/03/2023    CREATININE 1.03 08/03/2023      Lab Results   Component Value Date    PSA <0.10 01/16/2024       Assessment/Plan    Diagnoses and all orders for this visit:  Prostate cancer (CMS/HCC)  -     PSA; Future      BPH with LUTs s/p HoLEP (12/2022)   Incidentally found [place] 6 prostate cancer     PSA remains undetectable.     We will recheck PSA in 6 months.     Follow up virtually.     All questions were answered to the patient's satisfaction. Patient agrees with the plan and wishes to proceed. Follow-up will be scheduled appropriately.     Scribed for Dr. Byers by Carly Lynne. I , Dr Byers, have personally reviewed and agreed with the information entered by the Virtual Scribe.

## 2024-01-26 ENCOUNTER — HOSPITAL ENCOUNTER (OUTPATIENT)
Dept: RADIOLOGY | Facility: CLINIC | Age: 84
Discharge: HOME | End: 2024-01-26
Payer: MEDICARE

## 2024-01-26 DIAGNOSIS — R93.89 ABNORMAL CHEST X-RAY: ICD-10-CM

## 2024-01-26 PROCEDURE — 71250 CT THORAX DX C-: CPT | Performed by: RADIOLOGY

## 2024-01-26 PROCEDURE — 71250 CT THORAX DX C-: CPT

## 2024-01-29 DIAGNOSIS — R93.89 ABNORMAL CHEST CT: Primary | ICD-10-CM

## 2024-01-31 ENCOUNTER — TELEPHONE (OUTPATIENT)
Dept: PRIMARY CARE | Facility: CLINIC | Age: 84
End: 2024-01-31
Payer: MEDICARE

## 2024-01-31 NOTE — TELEPHONE ENCOUNTER
The patient came into the office requesting to speak with you for  2 pulmonary doctors referral you would recommend

## 2024-02-13 RX ORDER — TAMSULOSIN HYDROCHLORIDE 0.4 MG/1
1 CAPSULE ORAL DAILY
COMMUNITY
Start: 2022-12-20 | End: 2024-02-14 | Stop reason: WASHOUT

## 2024-02-14 ENCOUNTER — OFFICE VISIT (OUTPATIENT)
Dept: CARDIOLOGY | Facility: CLINIC | Age: 84
End: 2024-02-14
Payer: MEDICARE

## 2024-02-14 VITALS
WEIGHT: 166 LBS | DIASTOLIC BLOOD PRESSURE: 71 MMHG | OXYGEN SATURATION: 100 % | HEART RATE: 55 BPM | BODY MASS INDEX: 23.15 KG/M2 | SYSTOLIC BLOOD PRESSURE: 122 MMHG

## 2024-02-14 DIAGNOSIS — I48.91 ATRIAL FIBRILLATION, UNSPECIFIED TYPE (MULTI): Primary | ICD-10-CM

## 2024-02-14 PROCEDURE — 1160F RVW MEDS BY RX/DR IN RCRD: CPT | Performed by: NURSE PRACTITIONER

## 2024-02-14 PROCEDURE — 1126F AMNT PAIN NOTED NONE PRSNT: CPT | Performed by: NURSE PRACTITIONER

## 2024-02-14 PROCEDURE — 1036F TOBACCO NON-USER: CPT | Performed by: NURSE PRACTITIONER

## 2024-02-14 PROCEDURE — 99214 OFFICE O/P EST MOD 30 MIN: CPT | Mod: 25,27 | Performed by: NURSE PRACTITIONER

## 2024-02-14 PROCEDURE — 93005 ELECTROCARDIOGRAM TRACING: CPT | Performed by: NURSE PRACTITIONER

## 2024-02-14 PROCEDURE — 99214 OFFICE O/P EST MOD 30 MIN: CPT | Performed by: NURSE PRACTITIONER

## 2024-02-14 PROCEDURE — 1159F MED LIST DOCD IN RCRD: CPT | Performed by: NURSE PRACTITIONER

## 2024-02-14 PROCEDURE — 93010 ELECTROCARDIOGRAM REPORT: CPT | Performed by: INTERNAL MEDICINE

## 2024-02-14 ASSESSMENT — ENCOUNTER SYMPTOMS
LOSS OF SENSATION IN FEET: 0
OCCASIONAL FEELINGS OF UNSTEADINESS: 0
DEPRESSION: 0

## 2024-02-14 ASSESSMENT — PAIN SCALES - GENERAL: PAINLEVEL: 0-NO PAIN

## 2024-02-14 NOTE — PROGRESS NOTES
Chief Complaint:   3mo follow up EP     History Of Present Illness:    Kerwin Stark is a 83 y.o. male with PMH of HLD, BPH, ED, NL EF by Echo 3/2021, and persistent AF s/p AF/AFL ablation (PVI + CTI line) 2/17/21. AF recurred post ablation and pt felt tired w/ occas GOLDBERG but still was still able to golf. Had DCCV 4/27/21   but pt kept going into AT; thus Dr Ospina recommended Tikosyn initiation. Pt admits to GOLDBERG with walking uphill but not on flat ground. OAC is Xarelto.     Underwent Tikosyn loading at [facility] 6//21. Pt arrived from home in controlled AF (not on any AVN blockers). Was started on Tikosyn 500 mcg every 12 hours. QT prolongation was noted post 3rd dose thus Tikosyn was decreased to 250 mcg q12h. Pt underwent DCCV (single shock) resulting in SR then into A Tach with variable block and intermittent SR. Due to shortened QT on lower dose, Tikosyn was increased 1/2 step per Dr Ospina to 375mcg q12h; QTc remained stable. Pt had intermittent rate controlled AT 90bpm and SB/SR as well as intermittent Wenckebach conduction. Pt remained asymptomatic ambulating in [place] at least daily. Repeat DCCV was initially planned for 6/21 but pt had brief SB   with Wenckebach conduction the morning of 6/21 thus DCCV was canceled. Pt was back in controlled ATach 90s and   ambulated in whit[place], no issues, feels better than prior to admission.      Due to diffuse low voltage on EKG, hx NSVT in recovery during stress test in 2016 (no ischemia at submax HR), and hx carpal tunnel syndrome (though unilateral), pursued eval for amyloidosis as outpt (serum light chains drawn while inpt) and ordered nuclear med PYP scan in [facility] to be done as outpt. Free kappa light chains were elevated at 2.53 (NL to 1.94) with NL free lambda light chains; this is unlikely to represent AL amyloid. Discharged home in rates controlled AT.     7/15/21 1mo s/p tikosyn loading, ECG shows AT 99 bpm (reviewed by Anai). Pt asymptomatic in this and feels much  "better than when in AF. He remained in AT rates 85-95. During exertion rates trending 110-130 per Dr. Ospina we will continue with current regimen.      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  Currently following with HF Dr. Curran and Chucho for management of TTR Amyloid, on Vyndamax. Following with hem/onc Dr. Block for monitoring of blood work as well. ln October 2022 suffered a L elbow fracture and surgery, developed hematuria and has been following with Urology for BPH. Currently these issues have stabilized and he no longer is on BPH medications and no hematuria on Xarelto.     2/2023 Echo showed EF 50-55%, Dr. Del Rio feels he has HFpEF ( and started him on Jardiance)     Follows with [facility] Dr. Leticia Yuan for his amyloidosis now, he is in a study and receiving an injection(? placebo or actual drug). Sees him q 3mo. Also started on torsemide and feeling overall better breathing and no more LE edema.     8/2/23 routine 6mo Tikosyn follow up. He is feeling overall well and continues to walk daily. His ECG however shows SB 45 bpm w/ QTC of 583ms. We admitted him for observation while dc'd the tikoysn. His QTc came down over night and he was dc'd home the following day.   Feeling well at [address] [place] [address] follow up. ECG shows ACC junctional 74 bpm, small voltage, Qtc 475ms.     11/15/23 Feeling at baseline, staying active. Hrs 40-50 on apple watch, occasionally shows \"AF\" ECG in office shows likley slow AT as reviewed by Dr. Ospina.  Voltage is small, difficult to discern. No changes from EP standpoint at this time.     TODAY 3 mo follow up. Feeling overall in usual state of health. Staying active walking and playing golf even in the cold sometimes. Traveled over Holiday to [place] and felt well during this.  Heart rhythm on his wifes apple watch it says \"afib\" sometimes. The rates with this are not fast. His daily Hrs are ranging high 40s-50s mostly. He is not symptomatic when it says Afib. He occasional notes that when " he starts to walk at first or is on an incline he seems to feel a little winded at first then once start walking this subsides. BPs are stable. Denies any cp, sob, palpitations, LH/Dizziness, orthopnea, edema. bleeding.   Follows closely with Dr. Leticia [facility] at [facility] for his amyloid treatment and participating in study.  Sees them every 3mo.  ECG 2/14/24 ?AT 53 bpm vs AF, very low voltage. Qtc 453ms, Qrs narrow 72 ms       ROS:  Constitutional: not feeling poorly, no fever and no chills.   ENT: no nose bleeds  Cardiovascular: no chest pain, no tightness or heavy pressure, no shortness of breath, no palpitations, no lower extremity edema and as noted in HPI.   Respiratory: no cough, no shortness of breath during exertion, no PND, no orthopnea.   Gastrointestinal: no nausea, no vomiting, no melena  Genitourinary: no hematuria.   Musculoskeletal: no difficulty walking.   Neurological: no dizziness and no fainting.   Endocrine: no thyroid issues       Last Recorded Vitals:  Vitals:    02/14/24 0857   BP: 122/71   Pulse: 55   SpO2: 100%   Weight: 75.3 kg (166 lb)       Social History:  He reports that he has never smoked. He has never used smokeless tobacco. He reports that he does not drink alcohol and does not use drugs.    Family History:  Family History   Problem Relation Name Age of Onset    Ovarian cancer Mother          Allergies:  Patient has no known allergies.    Outpatient Medications:  Current Outpatient Medications   Medication Instructions    beta carotene (VITAMIN A) 10,000 Units, oral, Every 72 hours    empagliflozin (JARDIANCE) 10 mg, oral, Daily RT    spironolactone (ALDACTONE) 25 mg    tafamidis (VYNDAMAX) 61 mg, oral, Daily    torsemide (DEMADEX) 10 mg, oral    Xarelto 20 mg tablet TAKE 1 TABLET BY MOUTH  DAILY       Physical Exam:  Constitutional: alert and in no acute distress.   Eyes: no erythema, swelling or discharge from the eye .   Neck: neck is supple, symmetric, trachea midline, no masses .    Pulmonary: no increased work of breathing or signs of respiratory distress  and lungs clear to auscultation.    Cardiovascular: JVP was normal, no thrills , regular rhythm, normal S1 and S2, no murmurs , pedal pulses 2+ bilaterally  and no edema .   Abdomen: abdomen non-tender, no masses .   Skin: skin warm and dry, normal skin turgor .   Psychiatric judgment and insight is normal  and oriented to person, place and time .           Last Labs:  CBC -  Lab Results   Component Value Date    WBC 5.7 08/02/2023    HGB 13.2 (L) 08/02/2023    HCT 38.5 (L) 08/02/2023    MCV 90 08/02/2023     08/02/2023       CMP -  Lab Results   Component Value Date    CALCIUM 8.9 08/03/2023    PHOS 2.9 06/19/2021    PROT 6.9 12/27/2022    PROT 6.9 12/27/2022    ALBUMIN 4.1 12/27/2022    AST 17 12/27/2022    ALT 20 12/27/2022    ALKPHOS 118 12/27/2022    BILITOT 0.7 12/27/2022       LIPID PANEL -   Lab Results   Component Value Date    CHOL 164 06/15/2022    TRIG 59 06/15/2022    HDL 72.2 06/15/2022    CHHDL 2.3 06/15/2022    LDLF 80 06/15/2022    VLDL 12 06/15/2022       RENAL FUNCTION PANEL -   Lab Results   Component Value Date    GLUCOSE 86 08/03/2023     08/03/2023    K 4.2 08/03/2023     (H) 08/03/2023    CO2 21 08/03/2023    ANIONGAP 14 08/03/2023    BUN 21 08/03/2023    CREATININE 1.03 08/03/2023    GFRMALE 73 08/03/2023    CALCIUM 8.9 08/03/2023    PHOS 2.9 06/19/2021    ALBUMIN 4.1 12/27/2022            Last Cardiology Tests:  ECG:  ECG 11/15/23 AT 52 bpm   ECG 8/23/23 acc junction 74 bpm, voltage is very small, difficult to tell if actual p waves. QTc 475ms  ECG 8/2/23 SB 45bpm QTC 583ms (reviewed by myself and Dr. Larios)  ECG 1/27/23 AT 85 bpm QT// 502ms (on Tikoysn, reviewed by Anai)  ECG 7/5/22 AT 87 bpm w/ absolute QT 428ms/QTc 515 on Tikosyn 375mcg (reviewed by Dr. Ospina)  ECG 1/14/22 AT 95 bpm (Tikosyn 375) reviewed by Anai  ECG 7/15/21 AT 99 bpm QTc 513ms (Tikosyn 375) reviewed by  Anai  ECG 21 AT 94 bpm QTC 505ms (Tikosyn 375) reviewed by Anai    Echo:  2023-CONCLUSIONS:   1. Left ventricular systolic function is low normal with a 50-55% estimated ejection fraction.   2. There is low normal right ventricular systolic function.   3. The left atrium is moderately dilated.   4. The right atrium is severely dilated.   5. Severity MR varies with R-R interval.   6. Moderate mitral valve regurgitation.   7. Mild to moderate tricuspid regurgitation visualized.   8. RVSP within normal limits.   9. The inferior vena cava appears severely dilated.    Echo 3/2021: EF 55-60% (in AF), no WMA, NL RV size/low NL RV fxn, severe   LAE/mod SUSHANT, mild MAC/mild MR, mild TR, rvsp 42.8mm.     .    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      Scores and Scales  [id number]   1. Heart Failure or EF is less than or equal to 35%? No (0 pt)   2. Hypertension? No (0 pt)   3. Age? Greater than or equal to 75 (2 pt)   4. Diabetes? No (0 pt)   5. Stroke, TIA, or Systemic Emboli? No (0 pt)   6. Vascular Disease? No (0 pt)   7. Gender? Male (0 pt)   Total Risk Score: The [id number] Score is 2 which corresponds to High Risk.       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 Follows for his amyloid at [facility] with Dr. Leticia Yuan q 3mo, he is enrolled in a study where he receiving injection medication every month (unclear if placebo or actual med).      23 routine 6mo Tikosyn follow up. ECG showed SB 45 bpm w/ QTC of 583ms. this was a significant change from prior ECGs, also of note he has received two doses of new study medication (unclear if placebo or not). We directly admitted him for observation  "while dc'd the tikoysn. His QTc came down over night and he was dc'd home the following day. No complications.  2wk follow up- ECG shows ?acc junction 74 bpm , voltage is very small, difficult to tell if actual p waves. QTc 475ms. Feeling well, staying active.  Then ECG 11/15/23 ? Slow AT 50s      TODAY 3 mo follow up. He is feeling overall well and capable of doing his usual activities. Still notes that when he checks his heart rhythm on his wifes apple watch it says \"afib\" occasionally, sometimes it says sinus. The rates with this are not fast. His daily Hrs are ranging high 40s-50s mostly. He is not symptomatic when it says Afib. In general he is feeling well. Capable of doing all his usual actvities including golf and carrying his bag even at times. BPs are stable. He has noted some sticky mucous in his lungs and will be seeing pulmonolgy next week for further assessment, had CT chest and Xray ordered by Dr. Noland already.     ECG in office shows slow ?AT vs AF vs junctional 53 bpm, Qtc 452 reviewed by Anai)  We will hold the course for now since he seems to be feeling like this is managable and capable of staying active. He is anticoagulated to prevent stroke. He gets labs done with [facility] almost every 3mo with the HF team.    PLAN  Continue with current medications for now  Follow up with Dr. Yuan as scheduled q 3mo   Follow up in 6mo or sooner if symptoms start to worsen or change       Yasmeen Landis, MAYLIN-CNP  Reviewed with collaborating physician Dr. Ospina  "

## 2024-02-14 NOTE — PATIENT INSTRUCTIONS
It was so nice to see you today!    Your ECG appears to look the same as your last visit 3mo ago.   We are glad that you are staying active and feeling in your usual health.    Please continue with your current medications.  Continue to stay active.    Since you are feeling overall pretty well and functional with this we will hold the course and see you back in 6mo.  If you notice any worsening symptoms please give us a call and we will see you sooner to address it.

## 2024-02-22 ENCOUNTER — OFFICE VISIT (OUTPATIENT)
Dept: PULMONOLOGY | Facility: CLINIC | Age: 84
End: 2024-02-22
Payer: MEDICARE

## 2024-02-22 VITALS
BODY MASS INDEX: 23.65 KG/M2 | WEIGHT: 169.6 LBS | DIASTOLIC BLOOD PRESSURE: 69 MMHG | HEART RATE: 47 BPM | TEMPERATURE: 97.3 F | SYSTOLIC BLOOD PRESSURE: 111 MMHG

## 2024-02-22 DIAGNOSIS — R93.89 ABNORMAL CHEST CT: ICD-10-CM

## 2024-02-22 LAB
ATRIAL RATE: 57 BPM
Q ONSET: 226 MS
QRS COUNT: 9 BEATS
QRS DURATION: 72 MS
QT INTERVAL: 482 MS
QTC CALCULATION(BAZETT): 452 MS
QTC FREDERICIA: 462 MS
R AXIS: 70 DEGREES
T AXIS: 74 DEGREES
T OFFSET: 467 MS
VENTRICULAR RATE: 53 BPM

## 2024-02-22 PROCEDURE — 1126F AMNT PAIN NOTED NONE PRSNT: CPT | Performed by: STUDENT IN AN ORGANIZED HEALTH CARE EDUCATION/TRAINING PROGRAM

## 2024-02-22 PROCEDURE — 99214 OFFICE O/P EST MOD 30 MIN: CPT | Performed by: STUDENT IN AN ORGANIZED HEALTH CARE EDUCATION/TRAINING PROGRAM

## 2024-02-22 PROCEDURE — 1160F RVW MEDS BY RX/DR IN RCRD: CPT | Performed by: STUDENT IN AN ORGANIZED HEALTH CARE EDUCATION/TRAINING PROGRAM

## 2024-02-22 PROCEDURE — 1159F MED LIST DOCD IN RCRD: CPT | Performed by: STUDENT IN AN ORGANIZED HEALTH CARE EDUCATION/TRAINING PROGRAM

## 2024-02-22 PROCEDURE — 1036F TOBACCO NON-USER: CPT | Performed by: STUDENT IN AN ORGANIZED HEALTH CARE EDUCATION/TRAINING PROGRAM

## 2024-02-22 RX ORDER — ALBUTEROL SULFATE 90 UG/1
2 AEROSOL, METERED RESPIRATORY (INHALATION) EVERY 4 HOURS PRN
Qty: 8.5 G | Refills: 5 | Status: SHIPPED | OUTPATIENT
Start: 2024-02-22 | End: 2025-02-21

## 2024-02-22 ASSESSMENT — ENCOUNTER SYMPTOMS
SHORTNESS OF BREATH: 1
NAUSEA: 0
ABDOMINAL DISTENTION: 0
TROUBLE SWALLOWING: 0
CHILLS: 0
DIARRHEA: 0
VOMITING: 0
UNEXPECTED WEIGHT CHANGE: 0
ABDOMINAL PAIN: 0
RECTAL PAIN: 0
COLOR CHANGE: 0
BLOOD IN STOOL: 0
FEVER: 0
CONSTIPATION: 0
ANAL BLEEDING: 0

## 2024-02-22 NOTE — PROGRESS NOTES
Department of Medicine I Division of Pulmonary, Critical Care, and Sleep Medicine   [id number] [place] [address]  Phone: [phone]  Fax: [phone]    History of Present Illness   Kerwin Stark is a 83 y.o. male presenting with abnormal CT chest findings.     Referred by:  Lebron Noland MD, for abnormal CT chest findings. I have independently interviewed and examined the patient in the office and reviewed available records.    Here with his wife   Endorses months of having productive cough with grey phlegm and sometimes has a dark brown jelly like substance; this is the first time it happened to him  Last time he coughed up the dark brown substance was this morning --one speck in a glob of clear mucous   Was treated with antibiotics for a possible pneumonia; feels like it may have made improvement to the quality of the cough   Cough doesn't bother him at night,  mostly in the morning; denies PND or sinus drainage, no nose bleeds  Denies recurrent bronchitis   Denies any recent hospitalizations   He does get dyspnea with activity --mostly he has attributed it to his afib since it has been a longstanding problem for him   He was previously on tikosyn , he has never been on amiodarone     Does stay active--walk 5-6 miles a day, usually its tough when he first starts walking, but then eases up     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 Leticia Yuan at [facility] --is in a clinical trial(CARDIOTTRANSFORM-Eplontersen vs placebo); sees every 3 months ; did have a couple of 6mw as part of the protocol     Pulmonary medications: none   Review of Systems  Review of Systems   Constitutional:  Negative for chills, fever and unexpected weight change.   HENT:  Negative for trouble swallowing.    Respiratory:  Positive for shortness of breath.    Cardiovascular:  Negative for chest pain.   Gastrointestinal:  Negative for abdominal distention, abdominal pain, anal  bleeding, blood in stool, constipation, diarrhea, nausea, rectal pain and vomiting.   Skin:  Negative for color change.     All other review of systems are negative and/or non-contributory.    Past Medical History   He has a past medical history of Cough, unspecified, Disorder of prostate, unspecified, Impacted cerumen, bilateral (04/07/2017), Personal history of other diseases of the musculoskeletal system and connective tissue, Personal history of other diseases of the nervous system and sense organs, Personal history of other diseases of the nervous system and sense organs (12/27/2013), Personal history of other endocrine, nutritional and metabolic disease (06/06/2018), and Syncope and collapse (10/09/2013).    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       Immunizations     Immunization History   Administered Date(s) Administered    Influenza, High Dose Seasonal, Preservative Free 10/26/2015, 09/28/2016, 10/10/2018, 10/16/2019, 10/17/2022    Influenza, Seasonal, Quadrivalent, Adjuvanted 10/15/2021, 10/16/2023    Pfizer COVID-19 vaccine, Fall 2023, 12 years and older, (30mcg/0.3mL) 10/16/2023    Pfizer COVID-19 vaccine, bivalent, age 12 years and older (30 mcg/0.3 mL) 11/02/2022, 08/09/2023    Pfizer Gray Cap SARS-CoV-2 06/15/2022    Pfizer Purple Cap SARS-CoV-2 01/27/2021, 03/18/2021, 10/24/2021    Pneumococcal Conjugate PCV 7 1942    Pneumococcal conjugate vaccine, 13-valent (PREVNAR 13) 05/18/2016    Pneumococcal polysaccharide vaccine, 23-valent, age 2 years and older (PNEUMOVAX 23) 05/20/2010, 10/09/2010, 02/18/2021    Zoster vaccine, recombinant, adult (SHINGRIX) 04/10/2019, 05/17/2019    Zoster, live 10/09/2012, 11/07/2012       Medications and Allergies     Current Outpatient Medications   Medication Instructions    beta carotene (VITAMIN A) 10,000 Units, oral, Every 72 hours    empagliflozin (JARDIANCE) 10 mg, oral, Daily RT    spironolactone (ALDACTONE) 25  mg    tafamidis (VYNDAMAX) 61 mg, oral, Daily    torsemide (DEMADEX) 10 mg, oral    Xarelto 20 mg tablet TAKE 1 TABLET BY MOUTH  DAILY      Patient has no known allergies.    Social History   He reports that he has never smoked. He has never used smokeless tobacco. He reports that he does not drink alcohol and does not use drugs.    Smoking History: never smoker  Exposure/Job History:  (indoor and outdoor), Grew up in [place]--immigrated here in 1962. Did live in [place], [place]       Family History     Family History   Problem Relation Name Age of Onset    Ovarian cancer Mother         Surgical History   He has a past surgical history that includes Knee surgery (04/02/2013); Other surgical history (02/11/2020); Carpal tunnel release (04/02/2015); Colonoscopy (10/09/2013); Knee Arthroplasty (10/09/2013); Colonoscopy (03/24/2014); Total knee arthroplasty (03/28/2014); Knee surgery (10/11/2013); and Colonoscopy (05/2013).    Physical Exam   There were no vitals taken for this visit.     Vitals:    02/22/24 0813   BP: 111/69   Pulse: (!) 47   Temp: 36.3 °C (97.3 °F)       Physical Exam  Constitutional:       Appearance: Normal appearance.   HENT:      Head: Normocephalic and atraumatic.   Eyes:      Pupils: Pupils are equal, round, and reactive to light.   Cardiovascular:      Rate and Rhythm: Normal rate and regular rhythm.   Pulmonary:      Effort: Pulmonary effort is normal.      Breath sounds: Normal breath sounds.   Skin:     Findings: No rash.   Neurological:      General: No focal deficit present.      Mental Status: He is alert and oriented to person, place, and time.   Psychiatric:         Mood and Affect: Mood normal.       Vitals:    02/22/24 0813   BP: 111/69   Pulse: (!) 47   Temp: 36.3 °C (97.3 °F)   Pulse ox 96% on room air       Results   Pulmonary Function Tests:  None on file     Chest Radiograph:  XR chest 2 views 01/02/2024    Narrative  Interpreted By:  Zeina Uribe,  STUDY:  XR  CHEST 2 VIEWS;    INDICATION:  Signs/Symptoms:Cough x a few weeks.    COMPARISON:  Chest radiograph dated 12/17/2020    ACCESSION NUMBER(S):  [id number]    ORDERING CLINICIAN:  MARA BRAVO    FINDINGS:  The cardiac silhouette size is diffusely enlarged. Bilateral lungs  demonstrate hyperinflation with coarse interstitial markings,  suggestive of chronic lung parenchymal changes. There is ill-defined  airspace opacity in the left lung base/retrocardiac region, new  compared to prior radiograph dating back to 2020. This is concerning  for pneumonia. Large pleural effusions or pneumothorax.    Impression  Findings suggestive of pneumonia in the retrocardiac region. An  underlying neoplastic process can not be completely excluded,  especially given background lung parenchymal changes. Recommend  follow-up radiograph in 3-6 weeks to demonstrate resolution.    Signed by: Zeina rUibe 1/3/2024 8:41 PM  Dictation workstation:   [id number]      Chest CT Scan:  1/26/2024  5mm cuts  IMPRESSION:  1.  Reticular and ground-glass opacities within the bilateral lower  lobes, left-greater-than-right, favored to reflect a combination of  fibrosis and atelectasis. Otherwise, no acute cardiopulmonary process.  2. There are a few subcentimeter pulmonary nodules measuring up to  0.7 cm as described above. Recommend short interval CT chest  follow-up in 3 months for re-evaluation.  3. Scattered pleural calcifications of the bilateral lungs, correlate  with history of asbestos exposure.  4. Aneurysmal dilatation of the ascending thoracic aorta measuring up  to 4.4 cm in diameter.  5. Mild cardiomegaly.    ECHO:  No results found for this or any previous visit from the past 365 days.       Labs:  Lab Results   Component Value Date    WBC 5.7 08/02/2023    HGB 13.2 (L) 08/02/2023    HCT 38.5 (L) 08/02/2023    MCV 90 08/02/2023     08/02/2023       Lab Results   Component Value Date    CREATININE 1.03 08/03/2023    BUN 21  "08/03/2023     08/03/2023    K 4.2 08/03/2023     (H) 08/03/2023    CO2 21 08/03/2023        Lab Results   Component Value Date    SEDRATE 30 (H) 10/19/2022        IgE: No results found for: \"IGE\"   Respiratory Allergy Panel:  AEC:   Eosinophils Absolute (x10E9/L)   Date Value   12/27/2022 0.19     Eosinophils % (%)   Date Value   12/27/2022 2.4       Cultures:  No results found for: \"AFBCX\"   No results found for: \"RESPCULTCYFI\"    No results found for the last 90 days.  C     Assessment and Plan       Basilar reticular changes vs atelectasis  L> R ; evaluation is limited given low resolution of the scan (5mm cuts) --ATTR unusual to manifest in the lungs but can be possible ( can affect the interstitium, tracheobronchial involvement is also possible).  No other obvious exposures or other risk factors of ILD.   Pleural plaques --no known asbestos exposure; could be related to amyloidosis   Lung nodules 6-7mm   ?hemoptysis/brown colored phlegm --? Postinfectious     Recommendations  Albuterol prn for the cough   Repeat CT (HRCT) scan in April to follow up lung nodules as well as to evaluate the lung parenchyma--1mm cuts, prone and supine with expiratory films; then can decide on further testing pending results of the scan  Pulmonary function test and walk test --will get walk test results from his cardiologist office at [facility]   Hemoptysis seems to be improving,  if increases advised to call office and we can evaluate need for bronchoscopy (airway exam)     RTC after CT scan in May     Heather Machado MD  02/22/2024    "

## 2024-02-22 NOTE — PATIENT INSTRUCTIONS
Thank you for visiting the Pulmonary Clinic today.   Your breathing medications: albuterol as needed   Tests: breathing tests (call  to schedule), repeat CT scan in 3 months (April)  Return in May   If you have questions or concerns, call [phone] (option 4)

## 2024-02-28 ENCOUNTER — APPOINTMENT (OUTPATIENT)
Dept: CARDIOLOGY | Facility: CLINIC | Age: 84
End: 2024-02-28
Payer: MEDICARE

## 2024-03-06 ENCOUNTER — OFFICE VISIT (OUTPATIENT)
Dept: OTOLARYNGOLOGY | Facility: CLINIC | Age: 84
End: 2024-03-06
Payer: MEDICARE

## 2024-03-06 VITALS — BODY MASS INDEX: 23.29 KG/M2 | TEMPERATURE: 96.6 F | WEIGHT: 167 LBS

## 2024-03-06 DIAGNOSIS — H93.8X1 SENSATION OF FULLNESS IN RIGHT EAR: ICD-10-CM

## 2024-03-06 DIAGNOSIS — H61.23 BILATERAL IMPACTED CERUMEN: Primary | ICD-10-CM

## 2024-03-06 PROCEDURE — 1036F TOBACCO NON-USER: CPT | Performed by: NURSE PRACTITIONER

## 2024-03-06 PROCEDURE — 1160F RVW MEDS BY RX/DR IN RCRD: CPT | Performed by: NURSE PRACTITIONER

## 2024-03-06 PROCEDURE — 1159F MED LIST DOCD IN RCRD: CPT | Performed by: NURSE PRACTITIONER

## 2024-03-06 PROCEDURE — 1126F AMNT PAIN NOTED NONE PRSNT: CPT | Performed by: NURSE PRACTITIONER

## 2024-03-06 PROCEDURE — 99213 OFFICE O/P EST LOW 20 MIN: CPT | Performed by: NURSE PRACTITIONER

## 2024-03-06 ASSESSMENT — PATIENT HEALTH QUESTIONNAIRE - PHQ9
1. LITTLE INTEREST OR PLEASURE IN DOING THINGS: NOT AT ALL
2. FEELING DOWN, DEPRESSED OR HOPELESS: NOT AT ALL
SUM OF ALL RESPONSES TO PHQ9 QUESTIONS 1 AND 2: 0

## 2024-03-06 NOTE — PROGRESS NOTES
Subjective   Patient ID: Kerwin Stark is a 83 y.o. male who presents for Cerumen Impaction.  HPI  This patient presents for scheduled cerumen removal.  He complains of right aural fullness.  He denies any otalgia, otorrhea.  He does not wear hearing amplification.  Review of Systems  A comprehensive or 10 points review of the patient´s constitutional, neurological, HEENT, pulmonary, cardiovascular and genito-urinary systems showed only those mentioned in history of present illness.    Objective   Physical Exam  Constitutional: no fever, chills, weight loss or weight gain   General appearance: Appears well, well-nourished, well groomed. No acute distress.   Communication: Normal communication   Psychiatric: Oriented to person, place and time. Normal mood and affect.   Neurologic: Cranial nerves II-XII grossly intact and symmetric bilaterally.   Head and Face:   Head: Atraumatic with no masses, lesions or scarring.   Face: Normal symmetry, no paralysis, synkinesis or facial tic. No scars or deformities.     Eyes: Conjunctiva not edematous or erythematous   Ears: External inspection of ears with no deformity, scars or masses.  Bilateral canals with cerumen impactions     Neck: Normal appearing, symmetric, trachea midline.   Cardiovascular: Examination of peripheral vascular system shows no clubbing or cyanosis.   Respiratory: No respiratory distress increased work of breathing. Inspection of the chest with symmetric chest expansion and normal respiratory effort.   Skin: No rashes in the head or neck    Assessment/Plan        This patient presents for subsequent evaluation of acute acquired bilateral cerumen impaction and right aural fullness.    Reassurance given that otologic exam is normal after cleaning.  He may follow-up as needed.  All questions were answered to patient's satisfaction.    This note was created using speech recognition transcription software. Despite proofreading, several typographical errors  might be present that might affect the meaning of the content. Please call with any questions.  Patient ID: Kerwin Stark is a 83 y.o. male.    Ear cerumen removal    Date/Time: 3/6/2024 4:05 PM    Performed by: EVE Kelsey  Authorized by: EVE Kelsey    Consent:     Consent obtained:  Verbal    Consent given by:  Patient    Risks discussed:  Pain    Alternatives discussed:  No treatment  Procedure details:     Location:  L ear and R ear    Procedure type comment:  Alligator    Procedure outcomes: cerumen removed    Post-procedure details:     Inspection:  No bleeding, ear canal clear and TM intact    Hearing quality:  Improved    Procedure completion:  Tolerated well, no immediate complications      EVE Kelsey 03/06/24 4:04 PM

## 2024-03-30 DIAGNOSIS — I43 CARDIAC AMYLOIDOSIS (MULTI): ICD-10-CM

## 2024-03-30 DIAGNOSIS — E85.4 CARDIAC AMYLOIDOSIS (MULTI): ICD-10-CM

## 2024-04-01 RX ORDER — RIVAROXABAN 20 MG/1
TABLET, FILM COATED ORAL
Qty: 100 TABLET | Refills: 2 | Status: SHIPPED | OUTPATIENT
Start: 2024-04-01

## 2024-04-03 ENCOUNTER — HOSPITAL ENCOUNTER (OUTPATIENT)
Dept: RESPIRATORY THERAPY | Facility: HOSPITAL | Age: 84
Discharge: HOME | End: 2024-04-03
Payer: MEDICARE

## 2024-04-03 DIAGNOSIS — R93.89 ABNORMAL CHEST CT: ICD-10-CM

## 2024-04-03 PROCEDURE — 94060 EVALUATION OF WHEEZING: CPT | Performed by: INTERNAL MEDICINE

## 2024-04-03 PROCEDURE — 94726 PLETHYSMOGRAPHY LUNG VOLUMES: CPT

## 2024-04-03 PROCEDURE — 94729 DIFFUSING CAPACITY: CPT | Performed by: INTERNAL MEDICINE

## 2024-04-03 PROCEDURE — 94726 PLETHYSMOGRAPHY LUNG VOLUMES: CPT | Performed by: INTERNAL MEDICINE

## 2024-04-09 ENCOUNTER — APPOINTMENT (OUTPATIENT)
Dept: RADIOLOGY | Facility: CLINIC | Age: 84
End: 2024-04-09
Payer: MEDICARE

## 2024-04-17 ENCOUNTER — OFFICE VISIT (OUTPATIENT)
Dept: DERMATOLOGY | Facility: CLINIC | Age: 84
End: 2024-04-17
Payer: MEDICARE

## 2024-04-17 DIAGNOSIS — L57.0 ACTINIC KERATOSIS: Primary | ICD-10-CM

## 2024-04-17 DIAGNOSIS — L81.4 LENTIGO: ICD-10-CM

## 2024-04-17 DIAGNOSIS — Z85.820 PERSONAL HISTORY OF MALIGNANT MELANOMA OF SKIN: ICD-10-CM

## 2024-04-17 DIAGNOSIS — D36.10 NEUROFIBROMA: ICD-10-CM

## 2024-04-17 DIAGNOSIS — I87.8 VENOUS STASIS: ICD-10-CM

## 2024-04-17 DIAGNOSIS — D22.9 MULTIPLE BENIGN NEVI: ICD-10-CM

## 2024-04-17 PROCEDURE — 1159F MED LIST DOCD IN RCRD: CPT | Performed by: DERMATOLOGY

## 2024-04-17 PROCEDURE — 17000 DESTRUCT PREMALG LESION: CPT | Performed by: STUDENT IN AN ORGANIZED HEALTH CARE EDUCATION/TRAINING PROGRAM

## 2024-04-17 PROCEDURE — 99213 OFFICE O/P EST LOW 20 MIN: CPT | Performed by: DERMATOLOGY

## 2024-04-17 PROCEDURE — 1160F RVW MEDS BY RX/DR IN RCRD: CPT | Performed by: DERMATOLOGY

## 2024-04-17 PROCEDURE — 17003 DESTRUCT PREMALG LES 2-14: CPT | Performed by: STUDENT IN AN ORGANIZED HEALTH CARE EDUCATION/TRAINING PROGRAM

## 2024-04-17 NOTE — PROGRESS NOTES
Subjective     Kerwin Stark is a 83 y.o. male who presents for the following: Skin Check.     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 Warren on 11/30/2023. No lesions of concern today.     Review of Systems:  No other skin or systemic complaints other than what is documented elsewhere in the note.    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 Warren  - Superficial BCC on R anterior medial shin diagnosed 1/2023 s/p Mohs 2/2023  - Nodular BCC on L upper chest diagnosed in 2017 s/p ED&C    Biopsy Date Type Location Status   10/11/23 BCC Left lateral wrist Treatment Complete     Specialty Problems          Dermatology Problems    Venous stasis ulcer with varicose veins of lower extremity (Multi)    Actinic keratosis    Basal cell carcinoma of skin of other part of trunk    Basal cell carcinoma of skin of right upper limb, including shoulder    Carcinoma in situ of skin of other parts of face    Contusion of left thumb without damage to nail    Hemangioma of skin and subcutaneous tissue    Infected sebaceous cyst    Melanocytic nevi of left upper limb, including shoulder    Melanocytic nevi of trunk    Melanocytic nevi of unspecified lower limb, including hip    Melanoma in situ of left upper limb, including shoulder (Multi)    Nail disorder, unspecified    Neoplasm of uncertain behavior of skin    Other melanin hyperpigmentation    Other seborrheic keratosis    Personal history of other malignant neoplasm of skin    Scar condition and fibrosis of skin    Skin changes due to chronic exposure to nonionizing radiation, unspecified    Squamous cell carcinoma  of skin of other parts of face    Basal cell neoplasm    Bruising    Contusion of left elbow     Past Medical History:  Kerwin Stark  has a past medical history of Cough, unspecified, Disorder of prostate, unspecified, Impacted cerumen, bilateral (04/07/2017), Personal history of other diseases of the musculoskeletal system and connective tissue, Personal history of other diseases of the nervous system and sense organs, Personal history of other diseases of the nervous system and sense organs (12/27/2013), Personal history of other endocrine, nutritional and metabolic disease (06/06/2018), and Syncope and collapse (10/09/2013).    Past Surgical History:  Kerwin Stark  has a past surgical history that includes Knee surgery (04/02/2013); Other surgical history (02/11/2020); Carpal tunnel release (04/02/2015); Colonoscopy (10/09/2013); Knee Arthroplasty (10/09/2013); Colonoscopy (03/24/2014); Total knee arthroplasty (03/28/2014); Knee surgery (10/11/2013); and Colonoscopy (05/2013).    Family History:  Patient family history includes Ovarian cancer in his mother.    Social History:  Kerwin Strak  reports that he has never smoked. He has never used smokeless tobacco. He reports that he does not drink alcohol and does not use drugs.    Allergies:  Patient has no known allergies.    Current Medications / CAM's:    Current Outpatient Medications:     albuterol (ProAir HFA) 90 mcg/actuation inhaler, Inhale 2 puffs every 4 hours if needed for wheezing or shortness of breath., Disp: 8.5 g, Rfl: 5    beta carotene (vitamin A) 3,000 mcg (10,000 unit) capsule, Take 1 capsule (10,000 Units) by mouth every 3rd day., Disp: , Rfl:     empagliflozin (Jardiance) 10 mg, Take 1 tablet (10 mg) by mouth once daily., Disp: , Rfl:     spironolactone (Aldactone) 25 mg tablet, 1 tablet (25 mg)., Disp: , Rfl:     tafamidis (Vyndamax) 61 mg capsule, Take 1 capsule (61 mg) by mouth once daily., Disp: 30 capsule, Rfl: 11    torsemide (Demadex)  20 mg tablet, Take 0.5 tablets (10 mg) by mouth., Disp: , Rfl:     Xarelto 20 mg tablet, TAKE 1 TABLET BY MOUTH DAILY, Disp: 100 tablet, Rfl: 2     Objective   Well appearing patient in no apparent distress; mood and affect are within normal limits.    A full examination was performed including scalp, head, eyes, ears, nose, lips, neck, chest, axillae, abdomen, back, buttocks, bilateral upper extremities, bilateral lower extremities, hands, feet, fingers, toes, fingernails, and toenails. All findings within normal limits unless otherwise noted below.    Assessment/Plan   1. Actinic keratosis (4)  Head - Anterior (Face) (4)  Erythematous macules with gritty scale.    Actinic Keratoses - face.    - The pre-cancerous nature of these lesions and treatment options were discussed with the patient today.  At this time, we recommend treatment with liquid nitrogen cryotherapy.  The patient expressed understanding, is in agreement with this plan, and wishes to proceed with cryotherapy today.     Destr of lesion - Head - Anterior (Face)  Complexity: simple    Destruction method: cryotherapy    Informed consent: discussed and consent obtained    Lesion destroyed using liquid nitrogen: Yes    Cryotherapy cycles:  1  Outcome: patient tolerated procedure well with no complications    Post-procedure details: wound care instructions given      Related Procedures  Follow Up In Dermatology - Established Patient    2. Neurofibroma  Right Lower Back  Subcutaneous, mobile, soft nodule    Neurofibroma - involving R lower back  - Discussed benign nature of the lesion and no treatment is needed at this time    Related Procedures  Follow Up In Dermatology - Established Patient    3. Lentigo  Scattered tan macules in sun-exposed areas.    Solar Lentigines and photodamage.    - The clinically benign-appearing nature of these lesions and their relation to chronic sun exposure were discussed with the patient today and reassurance provided.  None  of these lesions meet threshold for biopsy today, and thus no treatment is medically indicated for these lesions at this time.  The signs and symptoms of skin cancer were reviewed and the patient was advised to practice sun protection and sun avoidance, use daily sunscreen, and perform regular self skin exams.  The patient was instructed to monitor these lesions for any changes, such as in size, shape, or color, or associated symptoms and to call our office to schedule a return visit for re-evaluation if any such changes or symptoms are noticed in the future.    Related Procedures  Follow Up In Dermatology - Established Patient    4. Multiple benign nevi  Scattered, uniform and benign-appearing, regular brown melanocytic papules and macules.    Clinically benign- to slightly atypical-appearing nevi  - None of the patient's nevi meet threshold for biopsy today. We emphasized the importance of performing monthly self-skin exams using the ABCDs of monitoring moles, which were reviewed with the patient today and an informational hand-out provided. We also emphasized the importance of sun avoidance and sun protection with daily sunscreen use.     Related Procedures  Follow Up In Dermatology - Established Patient    5. Personal history of malignant melanoma of skin  Left Upper Arm - Anterior  The sites of prior melanoma excision were examined.  There is no evidence of recurrent growth or pigmentation or recurrent disease, satellite papules, or nodules on exam today. On the patient's R anterior medial shin and L upper chest, there are well-healed scars with no evidence of recurrent growth on exam today.     History of lentigo maligna, nonmelanoma skin cancers, actinic keratoses, and photodamage.    - There is no evidence of local, regional, or distant recurrent disease or any new primary melanomas or nonmelanoma skin cancers on exam today.   - We emphasized the importance of continuing to perform monthly self-skin and  lymph node exams using the ABCDs of monitoring moles, which were reviewed with the patient, as well as the importance of sun avoidance and sun protection with daily sunscreen use.   - RTC 6 months    Related Procedures  Follow Up In Dermatology - Established Patient    6. Venous stasis  Legs  On the bilateral legs are diffuse varicosities with +1 pitting edema and surrounding hyperpigmentation and dyspigmentation consistent with chronic venous stasis changes.    Venous stasis - bilateral legs  - Patient currently wearing 18mmHg compression stockings  - Patient recommended to continue consistent use of compression stockings, suggested that he can try 30mmHg to see if that improves his legs.     Related Procedures  Follow Up In Dermatology - Established Patient      Ken Lakhani DO  PGY-3 Dermatology    I saw and evaluated the patient. I personally obtained the key and critical portions of the history and physical exam or was physically present for key and critical portions performed by the resident/fellow. I reviewed the resident/fellow's documentation and discussed the patient with the resident/fellow. I agree with the resident/fellow's medical decision making as documented in the note and made changes where appropriate.

## 2024-04-17 NOTE — PATIENT INSTRUCTIONS
It was great seeing you in Dr. Cummins's clinic today!    We froze a couple pre-cancerous lesions on your face today. Expect these to get irritated and possibly blister up over the next week. You can use Vaseline over it if you'd like. You can also test out wearing higher strength/pressure compression stockings to see if that improves your legs. We will plan to see you back in 6 months!

## 2024-04-23 ENCOUNTER — APPOINTMENT (OUTPATIENT)
Dept: PULMONOLOGY | Facility: HOSPITAL | Age: 84
End: 2024-04-23
Payer: MEDICARE

## 2024-04-24 ENCOUNTER — HOSPITAL ENCOUNTER (OUTPATIENT)
Dept: RADIOLOGY | Facility: CLINIC | Age: 84
Discharge: HOME | End: 2024-04-24
Payer: MEDICARE

## 2024-04-24 DIAGNOSIS — R93.89 ABNORMAL CHEST CT: ICD-10-CM

## 2024-04-24 PROCEDURE — 71250 CT THORAX DX C-: CPT

## 2024-04-24 PROCEDURE — 71250 CT THORAX DX C-: CPT | Performed by: RADIOLOGY

## 2024-04-30 ENCOUNTER — APPOINTMENT (OUTPATIENT)
Dept: PULMONOLOGY | Facility: HOSPITAL | Age: 84
End: 2024-04-30
Payer: MEDICARE

## 2024-05-16 ENCOUNTER — APPOINTMENT (OUTPATIENT)
Dept: PULMONOLOGY | Facility: CLINIC | Age: 84
End: 2024-05-16
Payer: MEDICARE

## 2024-05-23 ENCOUNTER — OFFICE VISIT (OUTPATIENT)
Dept: PULMONOLOGY | Facility: CLINIC | Age: 84
End: 2024-05-23
Payer: MEDICARE

## 2024-05-23 VITALS
OXYGEN SATURATION: 95 % | WEIGHT: 166.2 LBS | SYSTOLIC BLOOD PRESSURE: 105 MMHG | BODY MASS INDEX: 23.18 KG/M2 | TEMPERATURE: 98.1 F | DIASTOLIC BLOOD PRESSURE: 57 MMHG | HEART RATE: 64 BPM

## 2024-05-23 DIAGNOSIS — R93.89 ABNORMAL CHEST CT: ICD-10-CM

## 2024-05-23 DIAGNOSIS — R91.8 LUNG NODULES: ICD-10-CM

## 2024-05-23 DIAGNOSIS — R05.9 COUGH, UNSPECIFIED TYPE: Primary | ICD-10-CM

## 2024-05-23 PROCEDURE — 1159F MED LIST DOCD IN RCRD: CPT | Performed by: STUDENT IN AN ORGANIZED HEALTH CARE EDUCATION/TRAINING PROGRAM

## 2024-05-23 PROCEDURE — 99213 OFFICE O/P EST LOW 20 MIN: CPT | Performed by: STUDENT IN AN ORGANIZED HEALTH CARE EDUCATION/TRAINING PROGRAM

## 2024-05-23 PROCEDURE — 1160F RVW MEDS BY RX/DR IN RCRD: CPT | Performed by: STUDENT IN AN ORGANIZED HEALTH CARE EDUCATION/TRAINING PROGRAM

## 2024-05-23 ASSESSMENT — ENCOUNTER SYMPTOMS
ANAL BLEEDING: 0
BLOOD IN STOOL: 0
ABDOMINAL PAIN: 0
UNEXPECTED WEIGHT CHANGE: 0
FEVER: 0
NAUSEA: 0
SHORTNESS OF BREATH: 1
RECTAL PAIN: 0
COLOR CHANGE: 0
TROUBLE SWALLOWING: 0
VOMITING: 0
CONSTIPATION: 0
DIARRHEA: 0
CHILLS: 0
ABDOMINAL DISTENTION: 0

## 2024-05-23 NOTE — PROGRESS NOTES
Department of Medicine I Division of Pulmonary, Critical Care, and Sleep Medicine   [id number] [place] [address] [address] [place] [address]  Phone: [phone]  Fax: [phone]    History of Present Illness   Kerwin Stark is a 83 y.o. male presenting with abnormal CT chest findings.     5/2024  Since the last visit   PFT showed no obstruction and mild hyperinflation   HRCT chest showed dependent interlobular septal thickening, mild reticulations and pleural plaques   No known asbestos exposure   We sent request for walk test from [facility] but they could not find any record of a walk test there   He is is still having daily early morning hemoptysis--the size of a quarter--only happens first thing in the morning--once, does not happen later in the day or at any other time   Is on diuretics   Denies nosebleeds       2/2024  Referred by:  Dr. Noland  for abnormal CT chest findings. I have independently interviewed and examined the patient in the office and reviewed available records.    Here with his wife   Endorses months of having productive cough with grey phlegm and sometimes has a dark brown jelly like substance; this is the first time it happened to him  Last time he coughed up the dark brown substance was this morning --one speck in a glob of clear mucous   Was treated with antibiotics for a possible pneumonia; feels like it may have made improvement to the quality of the cough   Cough doesn't bother him at night,  mostly in the morning; denies PND or sinus drainage, no nose bleeds  Denies recurrent bronchitis   Denies any recent hospitalizations   He does get dyspnea with activity --mostly he has attributed it to his afib since it has been a longstanding problem for him   He was previously on tikosyn , he has never been on amiodarone     Does stay active--walk 5-6 miles a day, usually its tough when he first starts walking, but then eases up     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 Leticia Yuan at [facility] --is in a clinical trial(CARDIOTTRANSFORM-[place]tersen vs placebo); sees every 3 months ; did have a couple of 6mw as part of the protocol     Pulmonary medications: none   Review of Systems  Review of Systems   Constitutional:  Negative for chills, fever and unexpected weight change.   HENT:  Negative for trouble swallowing.    Respiratory:  Positive for shortness of breath.    Cardiovascular:  Negative for chest pain.   Gastrointestinal:  Negative for abdominal distention, abdominal pain, anal bleeding, blood in stool, constipation, diarrhea, nausea, rectal pain and vomiting.   Skin:  Negative for color change.     All other review of systems are negative and/or non-contributory.    Past Medical History   He has a past medical history of Cough, unspecified, Disorder of prostate, unspecified, Impacted cerumen, bilateral (04/07/2017), Personal history of other diseases of the musculoskeletal system and connective tissue, Personal history of other diseases of the nervous system and sense organs, Personal history of other diseases of the nervous system and sense organs (12/27/2013), Personal history of other endocrine, nutritional and metabolic disease (06/06/2018), and Syncope and collapse (10/09/2013).    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       Immunizations     Immunization History   Administered Date(s) Administered    Influenza, High Dose Seasonal, Preservative Free 10/26/2015, 09/28/2016, 10/10/2018, 10/16/2019, 10/17/2022    Influenza, Seasonal, Quadrivalent, Adjuvanted 10/15/2021, 10/16/2023    Pfizer COVID-19 vaccine, Fall 2023, 12 years and older, (30mcg/0.3mL) 10/16/2023    Pfizer COVID-19 vaccine, bivalent, age 12 years and older (30 mcg/0.3 mL) 11/02/2022, 08/09/2023    Pfizer Gray Cap SARS-CoV-2 06/15/2022    Pfizer Purple Cap SARS-CoV-2 01/27/2021, 03/18/2021, 10/24/2021    Pneumococcal Conjugate PCV 7 1942    Pneumococcal  conjugate vaccine, 13-valent (PREVNAR 13) 05/18/2016    Pneumococcal polysaccharide vaccine, 23-valent, age 2 years and older (PNEUMOVAX 23) 05/20/2010, 10/09/2010, 02/18/2021    Zoster vaccine, recombinant, adult (SHINGRIX) 04/10/2019, 05/17/2019    Zoster, live 10/09/2012, 11/07/2012       Medications and Allergies     Current Outpatient Medications   Medication Instructions    albuterol (ProAir HFA) 90 mcg/actuation inhaler 2 puffs, inhalation, Every 4 hours PRN    beta carotene (VITAMIN A) 10,000 Units, oral, Every 72 hours    empagliflozin (JARDIANCE) 10 mg, oral, Daily RT    spironolactone (ALDACTONE) 25 mg    tafamidis (VYNDAMAX) 61 mg, oral, Daily    torsemide (DEMADEX) 10 mg, oral    Xarelto 20 mg tablet TAKE 1 TABLET BY MOUTH DAILY      Patient has no known allergies.    Social History   He reports that he has never smoked. He has never used smokeless tobacco. He reports that he does not drink alcohol and does not use drugs.    Smoking History: never smoker  Exposure/Job History:  (indoor and outdoor), Grew up in [place]--immigrated here in 1962. Did live in [place], [place]       Family History     Family History   Problem Relation Name Age of Onset    Ovarian cancer Mother         Surgical History   He has a past surgical history that includes Knee surgery (04/02/2013); Other surgical history (02/11/2020); Carpal tunnel release (04/02/2015); Colonoscopy (10/09/2013); Knee Arthroplasty (10/09/2013); Colonoscopy (03/24/2014); Total knee arthroplasty (03/28/2014); Knee surgery (10/11/2013); and Colonoscopy (05/2013).    Physical Exam     Vitals:    05/23/24 1617   BP: 105/57   Pulse: 64   Temp: 36.7 °C (98.1 °F)   SpO2: 95%           Physical Exam  Constitutional:       Appearance: Normal appearance.   HENT:      Head: Normocephalic and atraumatic.   Eyes:      Pupils: Pupils are equal, round, and reactive to light.   Cardiovascular:      Rate and Rhythm: Normal rate and regular rhythm.    Pulmonary:      Effort: Pulmonary effort is normal.      Breath sounds: Normal breath sounds.   Skin:     Findings: No rash.   Neurological:      General: No focal deficit present.      Mental Status: He is alert and oriented to person, place, and time.   Psychiatric:         Mood and Affect: Mood normal.            Results   Pulmonary Function Tests:  4/3/2024   FEV1/FVC: 78  post FEV1:  90% pred T% pred  RV/T% pred  DLCO: 82% pred    Chest Radiograph:  XR chest 2 views 2024    Narrative  Interpreted By:  Zeina Uribe,  STUDY:  XR CHEST 2 VIEWS;    INDICATION:  Signs/Symptoms:Cough x a few weeks.    COMPARISON:  Chest radiograph dated 2020    ACCESSION NUMBER(S):  [id number]    ORDERING CLINICIAN:  MARA BRAVO    FINDINGS:  The cardiac silhouette size is diffusely enlarged. Bilateral lungs  demonstrate hyperinflation with coarse interstitial markings,  suggestive of chronic lung parenchymal changes. There is ill-defined  airspace opacity in the left lung base/retrocardiac region, new  compared to prior radiograph dating back to . This is concerning  for pneumonia. Large pleural effusions or pneumothorax.    Impression  Findings suggestive of pneumonia in the retrocardiac region. An  underlying neoplastic process can not be completely excluded,  especially given background lung parenchymal changes. Recommend  follow-up radiograph in 3-6 weeks to demonstrate resolution.    Signed by: Zeina Uribe 1/3/2024 8:41 PM  Dictation workstation:   [id number]      Chest CT Scan:  2024  5mm cuts  IMPRESSION:  1.  Reticular and ground-glass opacities within the bilateral lower  lobes, left-greater-than-right, favored to reflect a combination of  fibrosis and atelectasis. Otherwise, no acute cardiopulmonary process.  2. There are a few subcentimeter pulmonary nodules measuring up to  0.7 cm as described above. Recommend short interval CT chest  follow-up in 3 months for  re-evaluation.  3. Scattered pleural calcifications of the bilateral lungs, correlate  with history of asbestos exposure.  4. Aneurysmal dilatation of the ascending thoracic aorta measuring up  to 4.4 cm in diameter.  5. Mild cardiomegaly.    HRCT  4/24/2024  Narrative & Impression   Interpreted By:  John Costa,   STUDY:  CT CHEST HIGH RESOLUTION;  4/24/2024 8:27 am      INDICATION:  Signs/Symptoms:follow up lung nodules and reticular changes, 1mm  cuts, prone and supine, expiratory films.      COMPARISON:  None.      ACCESSION NUMBER(S):  [id number]      ORDERING CLINICIAN:  ELIECER HERNANDEZ      TECHNIQUE:  Using helical multidetector technique, volumetric data acquisition of  the chest was obtained without intravenous administration of contrast  material under the high resolution chest CT protocol. Examination  includes contiguous slices through the chest in supine positioning  during inspiration, noncontiguous axial slices in supine positioning  during expiration and prone positioning during inspiration.      FINDINGS:  LUNGS AND AIRWAYS:  The trachea and central airways are patent. No endobronchial lesion  is seen.      There is trace bilateral pleural effusion.      Scattered bilateral calcified pleural plaques.      There is interlobular septal thickening and faint ground-glass  opacity predominantly in the lower lobes, left more than right.      There is mild subpleural reticulation with no evidence of  bronchiectasis or bronchiolectasis. No honeycombing.      Mild air trapping in the expiratory phase images.      6 mm nodular density along the left major fissure, image 163/370,  likely a lymph node and unchanged compared to prior study.      7 mm left lower lobe nodular density, image 260/370, not definitely  seen on the prior study.      MEDIASTINUM AND EDGAR, LOWER NECK AND AXILLA:  The visualized thyroid gland is within normal limits.  No evidence of thoracic lymphadenopathy by CT  criteria.  Esophagus appears within normal limits as seen.      HEART AND VESSELS:  Mildly dilated ascending thoracic aorta measuring 4.2 cm.There is  mild scattered calcified atherosclerosis present. Slightly dilated  main pulmonary artery measuring 3.3 cm. Mild coronary artery  calcifications are seen.Please note,the study is not optimized for  evaluation of coronary arteries. Heart size is enlarged mainly due to  biatrial enlargement, right more than left. There is no pericardial  effusion seen.      UPPER ABDOMEN:  2.1 cm hypodense lesion in the right kidney, likely a renal cyst.              CHEST WALL AND OSSEOUS STRUCTURES:  Chest wall is within normal limits.  No acute osseous pathology.There are no suspicious osseous lesions.      Mild compression deformity of T1, T7, T8 and T12 vertebral bodies are  stable. Mild diffuse osteopenia.      IMPRESSION:  1. Mild subpleural reticulation with no evidence of bronchiectasis or  bronchiolectasis and no evidence of honeycombing. Findings may  represent mild nonspecific fibrosis in a pattern inconsistent with  UIP.  2. Interlobular septal thickening and ground-glass opacity  predominantly in the lower lobes and left more than right, not  significantly different. Findings may be due to component of  interstitial pulmonary edema. There is also trace bilateral pleural  effusion, new compared to prior study. Mild multifocal air trapping  which can be due to component of airway disease.  3. There is 7 mm left lower lobe pulmonary nodule, not definitely  seen on the prior study and might be a focal area of atelectasis.  Recommend however short-term follow-up chest CT in 3-6 months for  continued surveillance.  4. Multiple bilateral calcified pleural plaques raising concern for  prior asbestos exposure.  5. Cardiomegaly mainly due to biatrial enlargement with mild coronary  artery calcification. Slightly dilated main pulmonary artery which  can be seen in pulmonary artery  "hypertension.  6. Mildly dilated ascending thoracic aorta measuring 4.2 cm.  7. Other stable findings as described above.       ECHO:  No results found for this or any previous visit from the past 365 days.       Labs:  Lab Results   Component Value Date    WBC 5.7 08/02/2023    HGB 13.2 (L) 08/02/2023    HCT 38.5 (L) 08/02/2023    MCV 90 08/02/2023     08/02/2023       Lab Results   Component Value Date    CREATININE 1.03 08/03/2023    BUN 21 08/03/2023     08/03/2023    K 4.2 08/03/2023     (H) 08/03/2023    CO2 21 08/03/2023        Lab Results   Component Value Date    SEDRATE 30 (H) 10/19/2022        IgE: No results found for: \"IGE\"   Respiratory Allergy Panel:  AEC:   Eosinophils Absolute (x10E9/L)   Date Value   12/27/2022 0.19     Eosinophils % (%)   Date Value   12/27/2022 2.4       Cultures:  No results found for: \"AFBCX\"   No results found for: \"RESPCULTCYFI\"    No results found for the last 90 days.  C     Assessment and Plan       Basilar reticular changes vs atelectasis  L> R ; evaluation is limited given low resolution of the scan (5mm cuts) --ATTR unusual to manifest in the lungs but can be possible ( can affect the interstitium, tracheobronchial involvement is also possible).  No other obvious exposures or other risk factors of ILD.   HRCT from  is showing mild subpleural reticulations--PFTs do not show obstruction or restriction,very mild hyperinflation, normal dlco; additionally there is dependent interlobular septal thickening which likely is related to pulmonary edema   Pleural plaques --no known asbestos exposure; could be related to amyloidosis   Lung nodules 6-7mm   Morning hemoptysis     Recommendations  Will start with referral to ENT to see if there is any lesions that could be causing bleeding in the upper airway   If nothing seen and the hemoptysis is persistent we can consider bronchoscopy   PFTs are underwhelming and he has mild subpleural reticulations and pleural " plaques--will continue to monitor clinically at this time   Follow up CT chest for lung nodules in July  Albuterol as needed   RTC after ENT evaluation       Heather Machado MD  05/23/2024

## 2024-05-23 NOTE — PATIENT INSTRUCTIONS
Thank you for visiting the Pulmonary Clinic today.   Your breathing medications: can use the albuterol as needed   Referrals: ENT referral   Return in 2-3 months   If you have questions or concerns, call [phone] (option 4)

## 2024-06-19 ENCOUNTER — TELEPHONE (OUTPATIENT)
Dept: PULMONOLOGY | Facility: HOSPITAL | Age: 84
End: 2024-06-19
Payer: MEDICARE

## 2024-07-22 ENCOUNTER — APPOINTMENT (OUTPATIENT)
Dept: PULMONOLOGY | Facility: CLINIC | Age: 84
End: 2024-07-22
Payer: MEDICARE

## 2024-07-29 ENCOUNTER — HOSPITAL ENCOUNTER (OUTPATIENT)
Dept: RADIOLOGY | Facility: CLINIC | Age: 84
Discharge: HOME | End: 2024-07-29
Payer: MEDICARE

## 2024-07-29 DIAGNOSIS — R91.8 LUNG NODULES: ICD-10-CM

## 2024-07-29 PROCEDURE — 71250 CT THORAX DX C-: CPT | Performed by: RADIOLOGY

## 2024-07-29 PROCEDURE — 71250 CT THORAX DX C-: CPT

## 2024-07-30 ENCOUNTER — APPOINTMENT (OUTPATIENT)
Dept: UROLOGY | Facility: CLINIC | Age: 84
End: 2024-07-30
Payer: MEDICARE

## 2024-07-30 DIAGNOSIS — C61 PROSTATE CANCER (MULTI): Primary | Chronic | ICD-10-CM

## 2024-07-30 DIAGNOSIS — N39.3 STRESS INCONTINENCE: ICD-10-CM

## 2024-07-30 PROBLEM — I48.11 LONGSTANDING PERSISTENT ATRIAL FIBRILLATION (MULTI): Status: ACTIVE | Noted: 2024-03-07

## 2024-07-30 PROCEDURE — 99213 OFFICE O/P EST LOW 20 MIN: CPT | Performed by: UROLOGY

## 2024-07-30 PROCEDURE — G2211 COMPLEX E/M VISIT ADD ON: HCPCS | Performed by: UROLOGY

## 2024-07-31 ENCOUNTER — OFFICE VISIT (OUTPATIENT)
Dept: CARDIOLOGY | Facility: CLINIC | Age: 84
End: 2024-07-31
Payer: MEDICARE

## 2024-07-31 ENCOUNTER — LAB (OUTPATIENT)
Dept: LAB | Facility: LAB | Age: 84
End: 2024-07-31
Payer: MEDICARE

## 2024-07-31 ENCOUNTER — APPOINTMENT (OUTPATIENT)
Dept: CARDIOLOGY | Facility: CLINIC | Age: 84
End: 2024-07-31
Payer: MEDICARE

## 2024-07-31 VITALS
DIASTOLIC BLOOD PRESSURE: 57 MMHG | HEART RATE: 51 BPM | BODY MASS INDEX: 23.48 KG/M2 | OXYGEN SATURATION: 94 % | WEIGHT: 167.7 LBS | SYSTOLIC BLOOD PRESSURE: 109 MMHG | HEIGHT: 71 IN

## 2024-07-31 DIAGNOSIS — C61 PROSTATE CANCER (MULTI): ICD-10-CM

## 2024-07-31 DIAGNOSIS — I48.91 ATRIAL FIBRILLATION, UNSPECIFIED TYPE (MULTI): ICD-10-CM

## 2024-07-31 LAB — PSA SERPL-MCNC: <0.1 NG/ML

## 2024-07-31 PROCEDURE — 1159F MED LIST DOCD IN RCRD: CPT | Performed by: NURSE PRACTITIONER

## 2024-07-31 PROCEDURE — 1126F AMNT PAIN NOTED NONE PRSNT: CPT | Performed by: NURSE PRACTITIONER

## 2024-07-31 PROCEDURE — 99214 OFFICE O/P EST MOD 30 MIN: CPT | Performed by: NURSE PRACTITIONER

## 2024-07-31 PROCEDURE — 84153 ASSAY OF PSA TOTAL: CPT

## 2024-07-31 PROCEDURE — 1160F RVW MEDS BY RX/DR IN RCRD: CPT | Performed by: NURSE PRACTITIONER

## 2024-07-31 PROCEDURE — 93005 ELECTROCARDIOGRAM TRACING: CPT | Performed by: NURSE PRACTITIONER

## 2024-07-31 PROCEDURE — 36415 COLL VENOUS BLD VENIPUNCTURE: CPT

## 2024-07-31 RX ORDER — TORSEMIDE 10 MG/1
10 TABLET ORAL DAILY
COMMUNITY
Start: 2024-07-06

## 2024-07-31 ASSESSMENT — ENCOUNTER SYMPTOMS
LOSS OF SENSATION IN FEET: 0
DEPRESSION: 0
OCCASIONAL FEELINGS OF UNSTEADINESS: 0

## 2024-07-31 ASSESSMENT — COLUMBIA-SUICIDE SEVERITY RATING SCALE - C-SSRS
1. IN THE PAST MONTH, HAVE YOU WISHED YOU WERE DEAD OR WISHED YOU COULD GO TO SLEEP AND NOT WAKE UP?: NO
6. HAVE YOU EVER DONE ANYTHING, STARTED TO DO ANYTHING, OR PREPARED TO DO ANYTHING TO END YOUR LIFE?: NO
2. HAVE YOU ACTUALLY HAD ANY THOUGHTS OF KILLING YOURSELF?: NO

## 2024-07-31 ASSESSMENT — PAIN SCALES - GENERAL: PAINLEVEL: 0-NO PAIN

## 2024-07-31 NOTE — PROGRESS NOTES
Subjective     This visit was completed via telemedicine. All issues as below were discussed and addressed but no physical exam was performed unless allowed by visual confirmation. If it was felt that the patient should be evaluated in clinic, then they were directed there. Patient verbally consented to visit.      Kerwin Stark is a 84 y.o. male with history of BPH s/p HoLEP (12/2022) with incidentally found [place] 6 prostate cancer. He presents today for a follow up visit. He reports mild residual stress incontinence. He does not wear an protective underwear or depends. He reports leakage with straining.  Denies any recent gross hematuria, fevers, chills, urinary retention, intractable flank or abdominal pain, nausea or vomiting.          Past Medical History:   Diagnosis Date    Cough, unspecified     Cough    Disorder of prostate, unspecified     Prostate disorder    Impacted cerumen, bilateral 04/07/2017    Impacted cerumen of both ears    Personal history of other diseases of the musculoskeletal system and connective tissue     History of arthritis    Personal history of other diseases of the nervous system and sense organs     History of cataract    Personal history of other diseases of the nervous system and sense organs 12/27/2013    History of carpal tunnel syndrome    Personal history of other endocrine, nutritional and metabolic disease 06/06/2018    History of vitamin D deficiency    Syncope and collapse 10/09/2013    Vasovagal syncope     Past Surgical History:   Procedure Laterality Date    CARPAL TUNNEL RELEASE  04/02/2015    Neuroplasty Decompression Median Nerve At Carpal Tunnel    COLONOSCOPY  10/09/2013    Complete Colonoscopy    COLONOSCOPY  03/24/2014    Complete Colonoscopy    COLONOSCOPY  05/2013    rpt 5-23    KNEE ARTHROPLASTY  10/09/2013    Knee Arthroplasty    KNEE SURGERY  04/02/2013    Knee Surgery    KNEE SURGERY  10/11/2013    Knee Surgery    OTHER SURGICAL HISTORY  02/11/2020     Hand surgery    TOTAL KNEE ARTHROPLASTY  03/28/2014    Knee Replacement     Family History   Problem Relation Name Age of Onset    Ovarian cancer Mother       Current Outpatient Medications   Medication Sig Dispense Refill    albuterol (ProAir HFA) 90 mcg/actuation inhaler Inhale 2 puffs every 4 hours if needed for wheezing or shortness of breath. 8.5 g 5    beta carotene (vitamin A) 3,000 mcg (10,000 unit) capsule Take 1 capsule (10,000 Units) by mouth every 3rd day.      empagliflozin (Jardiance) 10 mg Take 1 tablet (10 mg) by mouth once daily.      spironolactone (Aldactone) 25 mg tablet 1 tablet (25 mg).      tafamidis (Vyndamax) 61 mg capsule Take 1 capsule (61 mg) by mouth once daily. 30 capsule 11    torsemide (Demadex) 20 mg tablet Take 0.5 tablets (10 mg) by mouth.      Xarelto 20 mg tablet TAKE 1 TABLET BY MOUTH DAILY 100 tablet 2     No current facility-administered medications for this visit.     No Known Allergies  Social History     Socioeconomic History    Marital status:      Spouse name: Not on file    Number of children: 1    Years of education: Not on file    Highest education level: Not on file   Occupational History    Occupation: Retired    Tobacco Use    Smoking status: Never    Smokeless tobacco: Never   Substance and Sexual Activity    Alcohol use: Never    Drug use: Never    Sexual activity: Not on file   Other Topics Concern    Not on file   Social History Narrative    Not on file     Social Determinants of Health     Financial Resource Strain: Not on file   Food Insecurity: Not on file   Transportation Needs: Not on file   Physical Activity: Sufficiently Active (8/30/2023)    Exercise Vital Sign     Days of Exercise per Week: 7 days     Minutes of Exercise per Session: 60 min   Stress: Not on file   Social Connections: Not on file   Intimate Partner Violence: Not on file   Housing Stability: Not on file       Review of Systems  Pertinent items are noted in  HPI.    Objective       Lab Review  Lab Results   Component Value Date    WBC 5.7 08/02/2023    RBC 4.30 (L) 08/02/2023    HGB 13.2 (L) 08/02/2023    HCT 38.5 (L) 08/02/2023     08/02/2023      Lab Results   Component Value Date    BUN 21 08/03/2023    CREATININE 1.03 08/03/2023      Lab Results   Component Value Date    PSA <0.10 01/16/2024           Assessment/Plan   Diagnoses and all orders for this visit:  Prostate cancer (Multi)  Stress incontinence    BPH with LUTs s/p HoLEP (12/2022)   Incidentally found [place] 6 prostate cancer     Patient is due for PSA. We will order this and follow up virtually to review.     Stress incontinence     We will refer patient to PFPT for further management.       All questions were answered to the patient's satisfaction. Patient agrees with the plan and wishes to proceed. Follow-up will be scheduled appropriately.     E&M visit today is associated with current or anticipated ongoing medical care services related to a patient's single, serious condition or a complex condition.    Scribed for Dr. Byers by Carly Lynne. I , Dr Byers, have personally reviewed and agreed with the information entered by the Virtual Scribe.

## 2024-07-31 NOTE — PATIENT INSTRUCTIONS
It was so nice to see you today!    Your ECG looks good and shows controlled heart rates.    Continue to stay active.    Follow up in 6mo.  Or sooner if needed.

## 2024-07-31 NOTE — PROGRESS NOTES
Chief Complaint:   6mo follow up EP     History Of Present Illness:    Kerwin Stark is a 84 y.o. male with PMH of HLD, BPH, ED, NL EF by Echo 3/2021, and persistent AF s/p AF/AFL ablation (PVI + CTI line) 2/17/21. AF recurred post ablation and pt felt tired w/ occas GOLDBERG but still was still able to golf. Had DCCV 4/27/21   but pt kept going into AT; thus Dr Ospina recommended Tikosyn initiation. Pt admits to GOLDBERG with walking uphill but not on flat ground. OAC is Xarelto.     Underwent Tikosyn loading at [facility] 6//21. Pt arrived from home in controlled AF (not on any AVN blockers). Was started on Tikosyn 500 mcg every 12 hours. QT prolongation was noted post 3rd dose thus Tikosyn was decreased to 250 mcg q12h. Pt underwent DCCV (single shock) resulting in SR then into A Tach with variable block and intermittent SR. Due to shortened QT on lower dose, Tikosyn was increased 1/2 step per Dr Ospina to 375mcg q12h; QTc remained stable. Pt had intermittent rate controlled AT 90bpm and SB/SR as well as intermittent Wenckebach conduction. Pt remained asymptomatic ambulating in [place] at least daily. Repeat DCCV was initially planned for 6/21 but pt had brief SB   with Wenckebach conduction the morning of 6/21 thus DCCV was canceled. Pt was back in controlled ATach 90s and   ambulated in [place], no issues, feels better than prior to admission.      Due to diffuse low voltage on EKG, hx NSVT in recovery during stress test in 2016 (no ischemia at submax HR), and hx carpal tunnel syndrome (though unilateral), pursued eval for amyloidosis as outpt (serum light chains drawn while inpt) and ordered nuclear med PYP scan in [facility] to be done as outpt. Free kappa light chains were elevated at 2.53 (NL to 1.94) with NL free lambda light chains; this is unlikely to represent AL amyloid. Discharged home in rates controlled AT.     7/15/21 1mo s/p tikosyn loading, ECG shows AT 99 bpm (reviewed by Anai). Pt asymptomatic in this and feels much  "better than when in AF. He remained in AT rates 85-95. During exertion rates trending 110-130 per Dr. Ospina we will continue with current regimen.      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  Currently following with HF Dr. Curran and Chucho for management of TTR Amyloid, on Vyndamax. Following with hem/onc Dr. Block for monitoring of blood work as well. ln October 2022 suffered a L elbow fracture and surgery, developed hematuria and has been following with Urology for BPH. Currently these issues have stabilized and he no longer is on BPH medications and no hematuria on Xarelto.     2/2023 Echo showed EF 50-55%, Dr. Del Rio feels he has HFpEF ( and started him on Jardiance)     Follows with [facility] Dr. Leticia Yuan for his amyloidosis now, he is in a study and receiving an injection(? placebo or actual drug). Sees him q 3mo. Also started on torsemide and feeling overall better breathing and no more LE edema.     8/2/23 routine 6mo Tikosyn follow up. He is feeling overall well and continues to walk daily. His ECG however shows SB 45 bpm w/ QTC of 583ms. We admitted him for observation while dc'd the tikoysn. His QTc came down over night and he was dc'd home the following day.   Feeling well at [address] [place] [address] follow up. ECG shows ACC junctional 74 bpm, small voltage, Qtc 475ms.     11/15/23 Feeling at baseline, staying active. Hrs 40-50 on apple watch, occasionally shows \"AF\" ECG in office shows likley slow AT as reviewed by Dr. Ospina.  Voltage is small, difficult to discern. No changes from EP standpoint at this time.     TODAY 6 mo follow up. Feeling overall in usual state of health. Staying active walking and playing golf. Some days are better than others, harder in the hot and humid weather. Walking about 3-4miles daily. THis daily Hrs are ranging high 40s-50s mostly. These do respond accordingly with exertion to about 110-130bpm.   He occasional notes that when he starts to walk at first or is on an incline he seems to " "feel a little winded at first then once start walking this subsides. BPs are stable. Denies any cp, sob, palpitations, LH/Dizziness, orthopnea, edema. bleeding.   Follows closely with Dr. Leticia Yuan at [facility] for his amyloid treatment and participating in study.  Sees them every 3mo.  ECG 7/31/24 ? AT vs AF 51 bpm, very low voltage  ECG 2/14/24 ?AT 53 bpm vs AF, very low voltage. Qtc 453ms, Qrs narrow 72 ms       ROS:  Constitutional: not feeling poorly, no fever and no chills.   ENT: no nose bleeds  Cardiovascular: no chest pain, no tightness or heavy pressure, no shortness of breath, no palpitations, no lower extremity edema and as noted in HPI.   Respiratory: no cough, no shortness of breath during exertion, no PND, no orthopnea.   Gastrointestinal: no nausea, no vomiting, no melena  Genitourinary: no hematuria.   Musculoskeletal: no difficulty walking.   Neurological: no dizziness and no fainting.   Endocrine: no thyroid issues       Last Recorded Vitals:  Vitals:    07/31/24 1124   BP: 109/57   BP Location: Left arm   Patient Position: Sitting   BP Cuff Size: Adult   Pulse: 51   SpO2: 94%   Weight: 76.1 kg (167 lb 11.2 oz)   Height: 1.803 m (5' 11\")       Social History:  He reports that he has never smoked. He has never used smokeless tobacco. He reports that he does not drink alcohol and does not use drugs.    Family History:  Family History   Problem Relation Name Age of Onset    Ovarian cancer Mother          Allergies:  Patient has no known allergies.    Outpatient Medications:  Current Outpatient Medications   Medication Instructions    albuterol (ProAir HFA) 90 mcg/actuation inhaler 2 puffs, inhalation, Every 4 hours PRN    beta carotene (VITAMIN A) 10,000 Units, oral, Every 72 hours    empagliflozin (JARDIANCE) 10 mg, oral, Daily RT    spironolactone (ALDACTONE) 25 mg    tafamidis (VYNDAMAX) 61 mg, oral, Daily    torsemide (DEMADEX) 10 mg, oral, Daily    Xarelto 20 mg tablet TAKE 1 TABLET BY MOUTH DAILY "       Physical Exam:  Constitutional: alert and in no acute distress.   Eyes: no erythema, swelling or discharge from the eye .   Neck: neck is supple, symmetric, trachea midline, no masses .   Pulmonary: no increased work of breathing or signs of respiratory distress  and lungs clear to auscultation.    Cardiovascular: JVP was normal, no thrills , regular rhythm, normal S1 and S2, no murmurs , pedal pulses 2+ bilaterally  and no edema .   Abdomen: abdomen non-tender, no masses .   Skin: skin warm and dry, normal skin turgor .   Psychiatric judgment and insight is normal  and oriented to person, place and time .           Last Labs:  CBC -  Lab Results   Component Value Date    WBC 5.7 08/02/2023    HGB 13.2 (L) 08/02/2023    HCT 38.5 (L) 08/02/2023    MCV 90 08/02/2023     08/02/2023       CMP -  Lab Results   Component Value Date    CALCIUM 8.9 08/03/2023    PHOS 2.9 06/19/2021    PROT 6.9 12/27/2022    PROT 6.9 12/27/2022    ALBUMIN 4.1 12/27/2022    AST 17 12/27/2022    ALT 20 12/27/2022    ALKPHOS 118 12/27/2022    BILITOT 0.7 12/27/2022       LIPID PANEL -   Lab Results   Component Value Date    CHOL 164 06/15/2022    TRIG 59 06/15/2022    HDL 72.2 06/15/2022    CHHDL 2.3 06/15/2022    LDLF 80 06/15/2022    VLDL 12 06/15/2022       RENAL FUNCTION PANEL -   Lab Results   Component Value Date    GLUCOSE 86 08/03/2023     08/03/2023    K 4.2 08/03/2023     (H) 08/03/2023    CO2 21 08/03/2023    ANIONGAP 14 08/03/2023    BUN 21 08/03/2023    CREATININE 1.03 08/03/2023    GFRMALE 73 08/03/2023    CALCIUM 8.9 08/03/2023    PHOS 2.9 06/19/2021    ALBUMIN 4.1 12/27/2022            Last Cardiology Tests:  ECG:  ECG 11/15/23 AT 52 bpm   ECG 8/23/23 acc junction 74 bpm, voltage is very small, difficult to tell if actual p waves. QTc 475ms  ECG 8/2/23 SB 45bpm QTC 583ms (reviewed by myself and Dr. Larios)  ECG 1/27/23 AT 85 bpm QT// 502ms (on Tikoysn, reviewed by Anai)  ECG 7/5/22 AT 87  bpm w/ absolute QT 428ms/QTc 515 on Tikosyn 375mcg (reviewed by Dr. Ospina)  ECG 22 AT 95 bpm (Tikosyn 375) reviewed by Anai  ECG 7/15/21 AT 99 bpm QTc 513ms (Tikosyn 375) reviewed by Anai  ECG 21 AT 94 bpm QTC 505ms (Tikosyn 375) reviewed by Anai    Echo:  2023-CONCLUSIONS:   1. Left ventricular systolic function is low normal with a 50-55% estimated ejection fraction.   2. There is low normal right ventricular systolic function.   3. The left atrium is moderately dilated.   4. The right atrium is severely dilated.   5. Severity MR varies with R-R interval.   6. Moderate mitral valve regurgitation.   7. Mild to moderate tricuspid regurgitation visualized.   8. RVSP within normal limits.   9. The inferior vena cava appears severely dilated.    Echo 3/2021: EF 55-60% (in AF), no WMA, NL RV size/low NL RV fxn, severe   LAE/mod SUSHANT, mild MAC/mild MR, mild TR, rvsp 42.8mm.     .    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      Scores and Scales  [id number]   1. Heart Failure or EF is less than or equal to 35%? No (0 pt)   2. Hypertension? No (0 pt)   3. Age? Greater than or equal to 75 (2 pt)   4. Diabetes? No (0 pt)   5. Stroke, TIA, or Systemic Emboli? No (0 pt)   6. Vascular Disease? No (0 pt)   7. Gender? Male (0 pt)   Total Risk Score: The [id number] Score is 2 which corresponds to High Risk.       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 Follows for his amyloid at [facility] with Dr. Leticia Yuan q 3mo, he is enrolled in a study where he receiving injection medication every month (unclear if placebo or actual med).      23 routine 6mo Tikosyn follow up. ECG showed SB 45 bpm w/ QTC  of 583ms. this was a significant change from prior ECGs, also of note he has received two doses of new study medication (unclear if placebo or not). We directly admitted him for observation while dc'd the tikoysn. His QTc came down over night and he was dc'd home the following day. No complications.  2wk follow up- ECG shows ?acc junction 74 bpm , voltage is very small, difficult to tell if actual p waves. QTc 475ms. Feeling well, staying active.  Then ECG 11/15/23 ? Slow AT 50s      TODAY 6 mo follow up. He is feeling overall well and capable of doing his usual activities. His daily Hrs are ranging high 40s-50s mostly. He is not symptomatic when it says Afib. In general he is feeling well. Capable of doing all his usual actvities including golf and carrying his bag. Walks 3-4 miles daily.  BPs are stable. He has noted some sticky mucous in his lungs in the morning and nasal drainage. Will follow up with pulmonary, had recent CT of chest.    ECG in office shows slow ?AT vs AF vs junctional 51 bpm  We will hold the course for now since he seems to be feeling like this is managable and capable of staying active. He is anticoagulated to prevent stroke. He gets labs done with [facility] almost every 3mo with the HF team.    PLAN  Continue with current medications for now  Follow up with Dr. Yuan as scheduled   Follow up in 6mo or sooner if symptoms start to worsen or change       MAYLIN Quintero-CNP  Reviewed with collaborating physician Dr. Ospina

## 2024-08-01 LAB
ATRIAL RATE: 55 BPM
Q ONSET: 222 MS
QRS COUNT: 9 BEATS
QRS DURATION: 76 MS
QT INTERVAL: 488 MS
QTC CALCULATION(BAZETT): 449 MS
QTC FREDERICIA: 462 MS
R AXIS: 40 DEGREES
T AXIS: 37 DEGREES
T OFFSET: 466 MS
VENTRICULAR RATE: 51 BPM

## 2024-08-05 ENCOUNTER — TELEPHONE (OUTPATIENT)
Dept: PULMONOLOGY | Facility: HOSPITAL | Age: 84
End: 2024-08-05
Payer: MEDICARE

## 2024-08-05 NOTE — TELEPHONE ENCOUNTER
Pt called with c/o coughing up blood. He states that at his last appointment with Dr. Machado the episodes were just at night, but now it has increased throughout the night and into the morning. He describes mucous as thick, dark red, and almost purple. He is leaving tomorrow for vacation and will be back next Tuesday. He is wondering if he should do anything before leaving. He is taking xarelto for afib. He is seeing ENT on 8/15/24. Dr. Machado notified.

## 2024-08-06 NOTE — TELEPHONE ENCOUNTER
Per Dr. Machado, pt needs to go to ER due to the increase in hemoptysis and call his cardiologist regarding his xarelto. Pt was updated on this matter. He stated that he wasn't sure how he was going to get to ER and was hesitant. The importance of going to ER was explained especially since he is going out of town. He verbalized understanding. He was also told to contact his cardiologist regarding holding his xarelto. He verbalized understanding.

## 2024-08-15 ENCOUNTER — APPOINTMENT (OUTPATIENT)
Dept: OTOLARYNGOLOGY | Facility: CLINIC | Age: 84
End: 2024-08-15
Payer: MEDICARE

## 2024-08-15 DIAGNOSIS — R05.3 CHRONIC COUGH: ICD-10-CM

## 2024-08-15 DIAGNOSIS — J38.7 PRESBYLARYNGES: ICD-10-CM

## 2024-08-15 DIAGNOSIS — R04.2 HEMOPTYSIS: Primary | ICD-10-CM

## 2024-08-15 DIAGNOSIS — R05.9 COUGH, UNSPECIFIED TYPE: ICD-10-CM

## 2024-08-15 NOTE — PROGRESS NOTES
ASSESSMENT AND PLAN:   Kerwin Stark is a 84 y.o. male with a history of a progressive cough that has transformed to hemoptysis recently, with laryngoscopy showing no obvious source of bleeding today. We discussed that there is likely not a contributing source in the larynx/oropharynx and that he would likely benefit from bronchoscopy to assess for possible pulmonary source, especially in the setting of known nodules and Hx of pneumonia in the retrocardiac region earlier this year.  We will reach out to his pulmonologist to discuss this.         NBI view of larynx and upper trachea    Reason For Consult  Cough, Globus     HISTORY OF PRESENT ILLNESS:  Kerwin Stark is a 84 y.o. male presenting for evaluation of chronic cough that has progressed over the last six months. Symptoms are worse at night and more recently, patient has began to cough up blood. It is typically just small amounts of blood but it has been persistent. Usually this improves throughout the day. He feels like he has something stuck in his throat often and has occasional trouble swallowing. He has had several CT scans performed, including a recent CT chest that shows relatively stable lung nodules. He is a lifelong nonsmoker. No neck masses, epistaxis. He does take 20 mg Xarelto daily for Afib.     Prior History:   Seen by Oliva Murphy on 03/06/2024  Per Dr. Heather Machado MD:     Basilar reticular changes vs atelectasis  L> R ; evaluation is limited given low resolution of the scan (5mm cuts) --ATTR unusual to manifest in the lungs but can be possible ( can affect the interstitium, tracheobronchial involvement is also possible).  No other obvious exposures or other risk factors of ILD.   HRCT from  is showing mild subpleural reticulations--PFTs do not show obstruction or restriction,very mild hyperinflation, normal dlco; additionally there is dependent interlobular septal thickening which likely is related to pulmonary edema   Pleural plaques --no  known asbestos exposure; could be related to amyloidosis   Lung nodules 6-7mm   Morning hemoptysis      Recommendations  Will start with referral to ENT to see if there is any lesions that could be causing bleeding in the upper airway   If nothing seen and the hemoptysis is persistent we can consider bronchoscopy   PFTs are underwhelming and he has mild subpleural reticulations and pleural plaques--will continue to monitor clinically at this time   Follow up CT chest for lung nodules in July  Albuterol as needed   RTC after ENT evaluation       Past Medical History  He has a past medical history of Cough, unspecified, Disorder of prostate, unspecified, Impacted cerumen, bilateral (04/07/2017), Personal history of other diseases of the musculoskeletal system and connective tissue, Personal history of other diseases of the nervous system and sense organs, Personal history of other diseases of the nervous system and sense organs (12/27/2013), Personal history of other endocrine, nutritional and metabolic disease (06/06/2018), and Syncope and collapse (10/09/2013). Surgical History  He has a past surgical history that includes Knee surgery (04/02/2013); Other surgical history (02/11/2020); Carpal tunnel release (04/02/2015); Colonoscopy (10/09/2013); Knee Arthroplasty (10/09/2013); Colonoscopy (03/24/2014); Total knee arthroplasty (03/28/2014); Knee surgery (10/11/2013); and Colonoscopy (05/2013).   Social History  He reports that he has never smoked. He has never used smokeless tobacco. He reports that he does not drink alcohol and does not use drugs. Allergies  Patient has no known allergies.     Family History  Family History   Problem Relation Name Age of Onset    Ovarian cancer Mother         Review of Systems  All 10 systems were reviewed and negative except for above.      Last Recorded Vitals  There were no vitals taken for this visit.    Physical Exam  Constitutional:  No acute distress  Voice:  No hoarseness  or other abnormality  Respiration:  Breathing comfortably, no stridor  Cardiovascular:  No clubbing/cyanosis/edema in hands  Eyes:  EOM intact, sclera normal  Neuro:  Alert and oriented times 3, Cranial nerves II-XII grossly intact and symmetric bilaterally  Head and Face:  Symmetric facial features, no masses or lesions, sinuses non-tender to palpation  Salivary Glands:  Parotid and submandibular glands normal bilaterally  Right Ear:  Normal external ear, external auditory canal, and TM to otoscopy, normal hearing to whispered voice.  Left Ear: Normal external ear, external auditory canal, and TM to otoscopy, normal hearing to whispered voice.  Nose:  External nose midline, anterior rhinoscopy is normal with limited visualization to the anterior aspect of the interior turbinates, no bleeding or drainage, no lesions  Oral Cavity/Oropharynx/Lips:  Normal mucous membranes, normal floor of mouth/tongue/OP, no masses or lesions  Pharynx: no masses or lesions  Neck/Lymph:  No LAD, no thyroid masses, trachea midline  Skin:  Neck skin is without scar or injury  Psych:  Alert and oriented with appropriate mood and affect]  Relevant Results       Patient Reported Outcome Measures         Radiology, Laboratory and Pathology  No results found.      Procedures   Flexible Laryngoscopy w/ Videostroboscopy    VOICE AND SPEECH CHARACTERISTICS:  Normal spoken speech, no dysphonia, no roughness, no breathiness, no asthenia, no strain.    Additional Voice Characteristics: N/A  Pitch: normal  Intelligibility: normal.   Resonance: balanced.   Vocal Loudness: normal.   Breath Support: normal.    PROCEDURE:    Indications: cough, hemoptysis  PROCEDURE NOTE: FLEXIBLE LARYNGOSCOPY WITH STROBOSCOPY  I recommended a flexible laryngoscopy with stroboscopy based on PE findings, and/or concern for mucosal wave details based upon history and/or for issues associated with hyperreflexic gag on mirror exam concerning for pathology. Risks, benefits,  and alternatives were explained. The patient wishes to proceed and gives verbal consent.   Patient is seated in the exam chair. After adequate topical anesthesia, I advance the flexible endoscope. The examination included evaluation of the salcido, vallecula, base of tongue, pyriforms, post-cricoid area, larynx and immediate subglottis.  Findings : assessment of the nasopharynx, base of tongue/vallecula, pyriform sinuses, post-cricoid area and pharyngeal walls was without lesion or mass and pharyngeal wall contraction is normal and symmetric    Gross Arytenoid Movement: symmetric.  Arytenoid Height: normal.   Supraglottic Tension: present.  Symmetry: normal.   Amplitude: normal.  Phase Closure: in-phase.  Mucosal Wave Lateral Excursion/Secondary Wave: Bilateral Vocal Cord: no restriction - wave moved more than ½ the width of the vocal fold.  Periodicity: normal.  Adynamic: None  Mass: No obvious mass  Closure: closed.  Additional Findings: Scant blood on laryngeal surface of epiglottis and in proximal trachea, seems to clear with swallow and coughing, no active bleeding sources noted  Assessment: Hemoptysis    Time Spent  Prep time on day of patient encounter: 10 minutes  Time spent directly with patient, family or caregiver: 15 minutes  Additional Time Spent on Patient Care Activities/Discussion with SLP re care plan: 5 minutes  Documentation Time: 10 minutes  Other Time Spent: 0 minutes  Total: 40 minutes

## 2024-08-19 DIAGNOSIS — R04.2 HEMOPTYSIS: ICD-10-CM

## 2024-08-19 DIAGNOSIS — Z01.818 PRE-OP TESTING: ICD-10-CM

## 2024-09-03 ENCOUNTER — LAB (OUTPATIENT)
Dept: LAB | Facility: LAB | Age: 84
End: 2024-09-03
Payer: MEDICARE

## 2024-09-03 DIAGNOSIS — Z01.818 PRE-OP TESTING: ICD-10-CM

## 2024-09-03 DIAGNOSIS — R04.2 HEMOPTYSIS: ICD-10-CM

## 2024-09-03 LAB
ANION GAP SERPL CALC-SCNC: 13 MMOL/L (ref 10–20)
BUN SERPL-MCNC: 31 MG/DL (ref 6–23)
CALCIUM SERPL-MCNC: 9.1 MG/DL (ref 8.6–10.6)
CHLORIDE SERPL-SCNC: 107 MMOL/L (ref 98–107)
CO2 SERPL-SCNC: 25 MMOL/L (ref 21–32)
CREAT SERPL-MCNC: 1.42 MG/DL (ref 0.5–1.3)
EGFRCR SERPLBLD CKD-EPI 2021: 49 ML/MIN/1.73M*2
ERYTHROCYTE [DISTWIDTH] IN BLOOD BY AUTOMATED COUNT: 13.8 % (ref 11.5–14.5)
GLUCOSE SERPL-MCNC: 100 MG/DL (ref 74–99)
HCT VFR BLD AUTO: 39.5 % (ref 41–52)
HGB BLD-MCNC: 13 G/DL (ref 13.5–17.5)
MCH RBC QN AUTO: 30.7 PG (ref 26–34)
MCHC RBC AUTO-ENTMCNC: 32.9 G/DL (ref 32–36)
MCV RBC AUTO: 93 FL (ref 80–100)
NRBC BLD-RTO: 0 /100 WBCS (ref 0–0)
PLATELET # BLD AUTO: 267 X10*3/UL (ref 150–450)
POTASSIUM SERPL-SCNC: 4.7 MMOL/L (ref 3.5–5.3)
RBC # BLD AUTO: 4.23 X10*6/UL (ref 4.5–5.9)
SODIUM SERPL-SCNC: 140 MMOL/L (ref 136–145)
WBC # BLD AUTO: 7.3 X10*3/UL (ref 4.4–11.3)

## 2024-09-03 PROCEDURE — 36415 COLL VENOUS BLD VENIPUNCTURE: CPT

## 2024-09-03 PROCEDURE — 80048 BASIC METABOLIC PNL TOTAL CA: CPT

## 2024-09-03 PROCEDURE — 85027 COMPLETE CBC AUTOMATED: CPT

## 2024-09-04 ENCOUNTER — APPOINTMENT (OUTPATIENT)
Dept: PRIMARY CARE | Facility: CLINIC | Age: 84
End: 2024-09-04
Payer: MEDICARE

## 2024-09-04 VITALS
WEIGHT: 168.8 LBS | TEMPERATURE: 96.8 F | DIASTOLIC BLOOD PRESSURE: 62 MMHG | SYSTOLIC BLOOD PRESSURE: 114 MMHG | HEART RATE: 52 BPM | BODY MASS INDEX: 23.54 KG/M2

## 2024-09-04 DIAGNOSIS — E55.9 VITAMIN D DEFICIENCY: ICD-10-CM

## 2024-09-04 DIAGNOSIS — I48.91 ATRIAL FIBRILLATION, UNSPECIFIED TYPE (MULTI): ICD-10-CM

## 2024-09-04 DIAGNOSIS — R53.81 MALAISE: ICD-10-CM

## 2024-09-04 DIAGNOSIS — R35.0 BENIGN PROSTATIC HYPERPLASIA WITH URINARY FREQUENCY: ICD-10-CM

## 2024-09-04 DIAGNOSIS — I43 CARDIAC AMYLOIDOSIS (MULTI): ICD-10-CM

## 2024-09-04 DIAGNOSIS — E85.9 AMYLOIDOSIS, UNSPECIFIED TYPE (MULTI): ICD-10-CM

## 2024-09-04 DIAGNOSIS — I50.30 HEART FAILURE WITH PRESERVED EJECTION FRACTION, BORDERLINE, CLASS II (MULTI): ICD-10-CM

## 2024-09-04 DIAGNOSIS — E78.2 MIXED HYPERLIPIDEMIA: ICD-10-CM

## 2024-09-04 DIAGNOSIS — N40.1 BENIGN PROSTATIC HYPERPLASIA WITH URINARY FREQUENCY: ICD-10-CM

## 2024-09-04 DIAGNOSIS — D47.2 MGUS (MONOCLONAL GAMMOPATHY OF UNKNOWN SIGNIFICANCE): ICD-10-CM

## 2024-09-04 DIAGNOSIS — N18.32 STAGE 3B CHRONIC KIDNEY DISEASE (MULTI): Primary | ICD-10-CM

## 2024-09-04 DIAGNOSIS — E85.4 CARDIAC AMYLOIDOSIS (MULTI): ICD-10-CM

## 2024-09-04 DIAGNOSIS — C44.519 BASAL CELL CARCINOMA OF SKIN OF OTHER PART OF TRUNK: ICD-10-CM

## 2024-09-04 PROCEDURE — 1160F RVW MEDS BY RX/DR IN RCRD: CPT | Performed by: INTERNAL MEDICINE

## 2024-09-04 PROCEDURE — 99397 PER PM REEVAL EST PAT 65+ YR: CPT | Performed by: INTERNAL MEDICINE

## 2024-09-04 PROCEDURE — 1159F MED LIST DOCD IN RCRD: CPT | Performed by: INTERNAL MEDICINE

## 2024-09-04 PROCEDURE — 1036F TOBACCO NON-USER: CPT | Performed by: INTERNAL MEDICINE

## 2024-09-04 PROCEDURE — 1170F FXNL STATUS ASSESSED: CPT | Performed by: INTERNAL MEDICINE

## 2024-09-04 PROCEDURE — G0439 PPPS, SUBSEQ VISIT: HCPCS | Performed by: INTERNAL MEDICINE

## 2024-09-04 ASSESSMENT — ACTIVITIES OF DAILY LIVING (ADL)
DRESSING: INDEPENDENT
BATHING: INDEPENDENT
MANAGING_FINANCES: INDEPENDENT
GROCERY_SHOPPING: INDEPENDENT
TAKING_MEDICATION: INDEPENDENT
DOING_HOUSEWORK: INDEPENDENT

## 2024-09-04 ASSESSMENT — ENCOUNTER SYMPTOMS
PHOTOPHOBIA: 0
FREQUENCY: 0
COUGH: 1
SHORTNESS OF BREATH: 1
LIGHT-HEADEDNESS: 0
WEAKNESS: 0
FLANK PAIN: 0
SINUS PRESSURE: 0
NECK STIFFNESS: 0
EYE PAIN: 0
NUMBNESS: 0
ADENOPATHY: 0
DYSURIA: 0
EYE ITCHING: 0
POLYPHAGIA: 0
JOINT SWELLING: 0
SINUS PAIN: 0
WOUND: 0
DIAPHORESIS: 0
VOMITING: 0
ABDOMINAL PAIN: 0
FACIAL ASYMMETRY: 0
ANAL BLEEDING: 0
HEMATURIA: 0
VOICE CHANGE: 0
PALPITATIONS: 0
BRUISES/BLEEDS EASILY: 0
FACIAL SWELLING: 0
ARTHRALGIAS: 0
DIZZINESS: 0
COLOR CHANGE: 0
BLOOD IN STOOL: 0
EYE REDNESS: 0
SLEEP DISTURBANCE: 0
RECTAL PAIN: 0
TREMORS: 0
TROUBLE SWALLOWING: 0
CHEST TIGHTNESS: 0
CONSTIPATION: 0
CHILLS: 0
BACK PAIN: 0
ACTIVITY CHANGE: 0
NECK PAIN: 0
SORE THROAT: 0
WHEEZING: 0
FATIGUE: 0
DIFFICULTY URINATING: 0
SPEECH DIFFICULTY: 0
POLYDIPSIA: 0
MYALGIAS: 0
CHOKING: 0
APPETITE CHANGE: 0
DIARRHEA: 0
NAUSEA: 0
EYE DISCHARGE: 0
STRIDOR: 0
HEADACHES: 0
ABDOMINAL DISTENTION: 0
SEIZURES: 0
RHINORRHEA: 0

## 2024-09-04 NOTE — PROGRESS NOTES
Subjective   Patient ID: Kerwin Stark is a 84 y.o. male who presents for Annual Exam and Medicare Annual Wellness Visit Subsequent.    Patient presents for wellness exam and physical exam.  He has been compliant with his medications, diet and exercise.  He continues to complain of a nonproductive cough which is worse in the morning.  He has occasional hemoptysis.  Reports baseline mild dyspnea on exertion.  He denies any headaches, no dizziness, does complain of allergy symptoms.  He denies any chest pain or palpitations, no abdominal pain no nausea vomiting or diarrhea.  He continues to complain of easy bruising.         Review of Systems   Constitutional:  Negative for activity change, appetite change, chills, diaphoresis and fatigue.   HENT:  Negative for congestion, dental problem, drooling, ear discharge, ear pain, facial swelling, hearing loss, mouth sores, nosebleeds, postnasal drip, rhinorrhea, sinus pressure, sinus pain, sneezing, sore throat, tinnitus, trouble swallowing and voice change.    Eyes:  Negative for photophobia, pain, discharge, redness, itching and visual disturbance.   Respiratory:  Positive for cough and shortness of breath. Negative for choking, chest tightness, wheezing and stridor.    Cardiovascular:  Negative for chest pain, palpitations and leg swelling.   Gastrointestinal:  Negative for abdominal distention, abdominal pain, anal bleeding, blood in stool, constipation, diarrhea, nausea, rectal pain and vomiting.   Endocrine: Negative for cold intolerance, heat intolerance, polydipsia, polyphagia and polyuria.   Genitourinary:  Negative for decreased urine volume, difficulty urinating, dysuria, enuresis, flank pain, frequency, genital sores, hematuria and urgency.   Musculoskeletal:  Negative for arthralgias, back pain, gait problem, joint swelling, myalgias, neck pain and neck stiffness.   Skin:  Negative for color change, pallor, rash and wound.   Neurological:  Negative for  dizziness, tremors, seizures, syncope, facial asymmetry, speech difficulty, weakness, light-headedness, numbness and headaches.   Hematological:  Negative for adenopathy. Does not bruise/bleed easily.   Psychiatric/Behavioral:  Negative for sleep disturbance.        Objective   Temp 36 °C (96.8 °F) (Temporal)   Wt 76.6 kg (168 lb 12.8 oz)   BMI 23.54 kg/m²     Physical Exam  Constitutional:       General: He is not in acute distress.     Appearance: Normal appearance. He is normal weight. He is not ill-appearing, toxic-appearing or diaphoretic.   HENT:      Head: Normocephalic.      Right Ear: Tympanic membrane, ear canal and external ear normal. There is no impacted cerumen.      Left Ear: Tympanic membrane, ear canal and external ear normal. There is no impacted cerumen.      Nose: Nose normal. No congestion or rhinorrhea.      Mouth/Throat:      Mouth: Mucous membranes are moist.      Pharynx: Oropharynx is clear. No oropharyngeal exudate or posterior oropharyngeal erythema.   Eyes:      General: No scleral icterus.        Right eye: No discharge.         Left eye: No discharge.      Extraocular Movements: Extraocular movements intact.      Conjunctiva/sclera: Conjunctivae normal.      Pupils: Pupils are equal, round, and reactive to light.   Neck:      Vascular: No carotid bruit.   Cardiovascular:      Rate and Rhythm: Regular rhythm. Bradycardia present.      Pulses: Normal pulses.      Heart sounds: Normal heart sounds. No murmur heard.     No friction rub. No gallop.   Pulmonary:      Effort: No respiratory distress.      Breath sounds: No stridor. No wheezing, rhonchi or rales.   Chest:      Chest wall: No tenderness.   Abdominal:      General: Abdomen is flat. Bowel sounds are normal. There is no distension.      Palpations: There is no mass.      Tenderness: There is no abdominal tenderness. There is no right CVA tenderness, left CVA tenderness or guarding.      Hernia: No hernia is present.    Genitourinary:     Penis: Normal.       Testes: Normal.   Musculoskeletal:         General: No swelling, tenderness, deformity or signs of injury. Normal range of motion.      Cervical back: No rigidity or tenderness.      Right lower leg: No edema.      Left lower leg: No edema.      Comments: Chronic venous stasis   Lymphadenopathy:      Cervical: No cervical adenopathy.   Skin:     General: Skin is warm and dry.      Coloration: Skin is not jaundiced or pale.      Findings: No bruising, erythema, lesion or rash.   Neurological:      General: No focal deficit present.      Mental Status: He is alert and oriented to person, place, and time. Mental status is at baseline.      Cranial Nerves: No cranial nerve deficit.      Sensory: No sensory deficit.      Motor: No weakness.      Coordination: Coordination normal.      Gait: Gait normal.      Deep Tendon Reflexes: Reflexes normal.   Psychiatric:         Mood and Affect: Mood normal.         Behavior: Behavior normal.         Thought Content: Thought content normal.         Judgment: Judgment normal.       onychomychosis  Assessment/Plan   Diagnoses and all orders for this visit:  Stage 3b chronic kidney disease (Multi)-recheck creatinine.  -     Albumin-Creatinine Ratio, Urine Random; Future  -     Uric Acid; Future  -     Urinalysis with Reflex Microscopic; Future  Mixed hyperlipidemia-check a lipid profile  -     Lipid Panel; Future  MGUS (monoclonal gammopathy of unknown significance)-check protein electrophoresis  -     Serum Protein Electrophoresis; Future  -     [place]/Lambda Free Light Chain, Serum; Future  Malaise  -     CBC and Auto Differential; Future  -     Comprehensive Metabolic Panel; Future  -     TSH with reflex to Free T4 if abnormal; Future  Vitamin D deficiency  -     Vitamin D 25-Hydroxy,Total (for eval of Vitamin D levels); Future  Atrial fibrillation, unspecified type (Multi)-rate is controlled.  Will continue with anticoagulation  Cardiac  amyloidosis (Multi)-follow-up with cardiology  Heart failure with preserved ejection fraction, borderline, class II (Multi)-stable symptoms  Benign prostatic hyperplasia with urinary frequency-he denies any symptoms.  Basal cell carcinoma of skin of other part of [id number] with dermatology  Amyloidosis, unspecified type (Multi)-follow-up with cardiology.  Health maintenance-Cologuard has been done.  Dermatology appointment has been done.  Eye and dental appointment have been done.  He will get the COVID, RSV and flu shot at the pharmacy.  He will also get a tetanus shot.  Cough with hemoptysis-bronchoscopy is pending.  Follow-up with pulmonary

## 2024-09-04 NOTE — PATIENT INSTRUCTIONS
Please take medication as prescribed.  Diet and exercise.  Follow-up in 6 months.  Obtain fasting blood work and urine.  Get your COVID, RSV and flu shot at the pharmacy.

## 2024-09-10 ENCOUNTER — APPOINTMENT (OUTPATIENT)
Dept: UROLOGY | Facility: CLINIC | Age: 84
End: 2024-09-10
Payer: MEDICARE

## 2024-09-16 ENCOUNTER — ANESTHESIA (OUTPATIENT)
Dept: GASTROENTEROLOGY | Facility: HOSPITAL | Age: 84
End: 2024-09-16
Payer: MEDICARE

## 2024-09-16 ENCOUNTER — ANESTHESIA EVENT (OUTPATIENT)
Dept: GASTROENTEROLOGY | Facility: HOSPITAL | Age: 84
End: 2024-09-16
Payer: MEDICARE

## 2024-09-16 ENCOUNTER — HOSPITAL ENCOUNTER (OUTPATIENT)
Dept: GASTROENTEROLOGY | Facility: HOSPITAL | Age: 84
Setting detail: OUTPATIENT SURGERY
Discharge: HOME | End: 2024-09-16
Payer: MEDICARE

## 2024-09-16 VITALS
HEIGHT: 71 IN | HEART RATE: 52 BPM | DIASTOLIC BLOOD PRESSURE: 80 MMHG | TEMPERATURE: 96.8 F | RESPIRATION RATE: 19 BRPM | WEIGHT: 164 LBS | OXYGEN SATURATION: 97 % | SYSTOLIC BLOOD PRESSURE: 123 MMHG | BODY MASS INDEX: 22.96 KG/M2

## 2024-09-16 DIAGNOSIS — R04.2 HEMOPTYSIS: ICD-10-CM

## 2024-09-16 LAB
BASOPHILS NFR FLD MANUAL: 0 %
BLASTS NFR FLD MANUAL: 0 %
CLARITY FLD: ABNORMAL
COLOR FLD: ABNORMAL
EOSINOPHIL NFR FLD MANUAL: 0 %
IMMATURE GRANULOCYTES IN FLUID: 0 %
LYMPHOCYTES NFR FLD MANUAL: 88 %
MONOS+MACROS NFR FLD MANUAL: 5 %
NEUTROPHILS NFR FLD MANUAL: 7 %
OTHER CELLS NFR FLD MANUAL: 0 %
PLASMA CELLS NFR FLD MANUAL: 0 %
RBC # FLD AUTO: ABNORMAL /UL
TOTAL CELLS COUNTED FLD: 100
WBC # FLD AUTO: 370 /UL

## 2024-09-16 PROCEDURE — 87015 SPECIMEN INFECT AGNT CONCNTJ: CPT | Performed by: STUDENT IN AN ORGANIZED HEALTH CARE EDUCATION/TRAINING PROGRAM

## 2024-09-16 PROCEDURE — 7100000002 HC RECOVERY ROOM TIME - EACH INCREMENTAL 1 MINUTE

## 2024-09-16 PROCEDURE — 2500000004 HC RX 250 GENERAL PHARMACY W/ HCPCS (ALT 636 FOR OP/ED)

## 2024-09-16 PROCEDURE — 7100000010 HC PHASE TWO TIME - EACH INCREMENTAL 1 MINUTE

## 2024-09-16 PROCEDURE — 2500000005 HC RX 250 GENERAL PHARMACY W/O HCPCS

## 2024-09-16 PROCEDURE — 3700000002 HC GENERAL ANESTHESIA TIME - EACH INCREMENTAL 1 MINUTE

## 2024-09-16 PROCEDURE — 99100 ANES PT EXTEME AGE<1 YR&>70: CPT | Performed by: ANESTHESIOLOGY

## 2024-09-16 PROCEDURE — 87102 FUNGUS ISOLATION CULTURE: CPT | Performed by: STUDENT IN AN ORGANIZED HEALTH CARE EDUCATION/TRAINING PROGRAM

## 2024-09-16 PROCEDURE — A31624 PR BRONCHOSCOPY,DIAGNOSTIC W LAVAGE: Performed by: ANESTHESIOLOGY

## 2024-09-16 PROCEDURE — 31624 DX BRONCHOSCOPE/LAVAGE: CPT | Mod: GC | Performed by: STUDENT IN AN ORGANIZED HEALTH CARE EDUCATION/TRAINING PROGRAM

## 2024-09-16 PROCEDURE — 88112 CYTOPATH CELL ENHANCE TECH: CPT | Mod: TC,MCY | Performed by: STUDENT IN AN ORGANIZED HEALTH CARE EDUCATION/TRAINING PROGRAM

## 2024-09-16 PROCEDURE — 7100000009 HC PHASE TWO TIME - INITIAL BASE CHARGE

## 2024-09-16 PROCEDURE — 87070 CULTURE OTHR SPECIMN AEROBIC: CPT | Performed by: STUDENT IN AN ORGANIZED HEALTH CARE EDUCATION/TRAINING PROGRAM

## 2024-09-16 PROCEDURE — 7100000001 HC RECOVERY ROOM TIME - INITIAL BASE CHARGE

## 2024-09-16 PROCEDURE — 89051 BODY FLUID CELL COUNT: CPT | Performed by: STUDENT IN AN ORGANIZED HEALTH CARE EDUCATION/TRAINING PROGRAM

## 2024-09-16 PROCEDURE — 31622 DX BRONCHOSCOPE/WASH: CPT | Performed by: STUDENT IN AN ORGANIZED HEALTH CARE EDUCATION/TRAINING PROGRAM

## 2024-09-16 PROCEDURE — 31624 DX BRONCHOSCOPE/LAVAGE: CPT | Performed by: STUDENT IN AN ORGANIZED HEALTH CARE EDUCATION/TRAINING PROGRAM

## 2024-09-16 PROCEDURE — 3700000001 HC GENERAL ANESTHESIA TIME - INITIAL BASE CHARGE

## 2024-09-16 RX ORDER — METOCLOPRAMIDE HYDROCHLORIDE 5 MG/ML
10 INJECTION INTRAMUSCULAR; INTRAVENOUS ONCE AS NEEDED
Status: DISCONTINUED | OUTPATIENT
Start: 2024-09-16 | End: 2024-09-17 | Stop reason: HOSPADM

## 2024-09-16 RX ORDER — DROPERIDOL 2.5 MG/ML
0.62 INJECTION, SOLUTION INTRAMUSCULAR; INTRAVENOUS ONCE AS NEEDED
Status: DISCONTINUED | OUTPATIENT
Start: 2024-09-16 | End: 2024-09-17 | Stop reason: HOSPADM

## 2024-09-16 RX ORDER — SODIUM CHLORIDE, SODIUM LACTATE, POTASSIUM CHLORIDE, CALCIUM CHLORIDE 600; 310; 30; 20 MG/100ML; MG/100ML; MG/100ML; MG/100ML
100 INJECTION, SOLUTION INTRAVENOUS CONTINUOUS
Status: DISCONTINUED | OUTPATIENT
Start: 2024-09-16 | End: 2024-09-17 | Stop reason: HOSPADM

## 2024-09-16 RX ORDER — MIDAZOLAM HYDROCHLORIDE 1 MG/ML
INJECTION INTRAMUSCULAR; INTRAVENOUS AS NEEDED
Status: DISCONTINUED | OUTPATIENT
Start: 2024-09-16 | End: 2024-09-16

## 2024-09-16 RX ORDER — OXYCODONE AND ACETAMINOPHEN 5; 325 MG/1; MG/1
1 TABLET ORAL EVERY 4 HOURS PRN
Status: DISCONTINUED | OUTPATIENT
Start: 2024-09-16 | End: 2024-09-17 | Stop reason: HOSPADM

## 2024-09-16 RX ORDER — OXYCODONE HYDROCHLORIDE 5 MG/1
10 TABLET ORAL EVERY 4 HOURS PRN
Status: DISCONTINUED | OUTPATIENT
Start: 2024-09-16 | End: 2024-09-17 | Stop reason: HOSPADM

## 2024-09-16 RX ORDER — HYDROMORPHONE HYDROCHLORIDE 1 MG/ML
0.5 INJECTION, SOLUTION INTRAMUSCULAR; INTRAVENOUS; SUBCUTANEOUS EVERY 5 MIN PRN
Status: DISCONTINUED | OUTPATIENT
Start: 2024-09-16 | End: 2024-09-17 | Stop reason: HOSPADM

## 2024-09-16 RX ORDER — FENTANYL CITRATE 50 UG/ML
INJECTION, SOLUTION INTRAMUSCULAR; INTRAVENOUS AS NEEDED
Status: DISCONTINUED | OUTPATIENT
Start: 2024-09-16 | End: 2024-09-16

## 2024-09-16 RX ORDER — LIDOCAINE HYDROCHLORIDE 10 MG/ML
0.1 INJECTION, SOLUTION EPIDURAL; INFILTRATION; INTRACAUDAL; PERINEURAL ONCE
Status: DISCONTINUED | OUTPATIENT
Start: 2024-09-16 | End: 2024-09-17 | Stop reason: HOSPADM

## 2024-09-16 RX ORDER — MIDAZOLAM HYDROCHLORIDE 1 MG/ML
1 INJECTION INTRAMUSCULAR; INTRAVENOUS ONCE AS NEEDED
Status: DISCONTINUED | OUTPATIENT
Start: 2024-09-16 | End: 2024-09-17 | Stop reason: HOSPADM

## 2024-09-16 RX ORDER — SODIUM CHLORIDE, SODIUM LACTATE, POTASSIUM CHLORIDE, CALCIUM CHLORIDE 600; 310; 30; 20 MG/100ML; MG/100ML; MG/100ML; MG/100ML
INJECTION, SOLUTION INTRAVENOUS CONTINUOUS PRN
Status: DISCONTINUED | OUTPATIENT
Start: 2024-09-16 | End: 2024-09-16

## 2024-09-16 RX ORDER — LIDOCAINE HYDROCHLORIDE 20 MG/ML
INJECTION, SOLUTION INFILTRATION; PERINEURAL AS NEEDED
Status: DISCONTINUED | OUTPATIENT
Start: 2024-09-16 | End: 2024-09-16

## 2024-09-16 RX ORDER — LABETALOL HYDROCHLORIDE 5 MG/ML
5 INJECTION, SOLUTION INTRAVENOUS ONCE AS NEEDED
Status: DISCONTINUED | OUTPATIENT
Start: 2024-09-16 | End: 2024-09-17 | Stop reason: HOSPADM

## 2024-09-16 RX ORDER — ONDANSETRON HYDROCHLORIDE 2 MG/ML
INJECTION, SOLUTION INTRAVENOUS AS NEEDED
Status: DISCONTINUED | OUTPATIENT
Start: 2024-09-16 | End: 2024-09-16

## 2024-09-16 RX ORDER — PROPOFOL 10 MG/ML
INJECTION, EMULSION INTRAVENOUS CONTINUOUS PRN
Status: DISCONTINUED | OUTPATIENT
Start: 2024-09-16 | End: 2024-09-16

## 2024-09-16 RX ORDER — HYDROMORPHONE HYDROCHLORIDE 1 MG/ML
0.2 INJECTION, SOLUTION INTRAMUSCULAR; INTRAVENOUS; SUBCUTANEOUS EVERY 5 MIN PRN
Status: DISCONTINUED | OUTPATIENT
Start: 2024-09-16 | End: 2024-09-17 | Stop reason: HOSPADM

## 2024-09-16 RX ORDER — ACETAMINOPHEN 325 MG/1
650 TABLET ORAL EVERY 4 HOURS PRN
Status: DISCONTINUED | OUTPATIENT
Start: 2024-09-16 | End: 2024-09-17 | Stop reason: HOSPADM

## 2024-09-16 ASSESSMENT — PAIN SCALES - GENERAL
PAINLEVEL_OUTOF10: 0 - NO PAIN
PAIN_LEVEL: 1
PAINLEVEL_OUTOF10: 0 - NO PAIN
PAINLEVEL_OUTOF10: 0 - NO PAIN

## 2024-09-16 ASSESSMENT — PAIN - FUNCTIONAL ASSESSMENT
PAIN_FUNCTIONAL_ASSESSMENT: 0-10
PAIN_FUNCTIONAL_ASSESSMENT: 0-10

## 2024-09-16 NOTE — H&P
History Of Present Illness  Kerwin Stark is a 84 y.o. male with pmhx significant for afib (on xarelto, last dose Friday 9/13) who has been following with pulmonary for hemoptysis that he reports is almost every day in the morning since at least April, when he started taking note of the frequency and recording it. Denies hemoptysis throughout the day other than the morning. No significant cough other than this, typically dry cough.     He has some dyspnea chronically that he thinks is in the setting of afib, that he thinks is improving if anything. It has not changed in the past 6 months.      Past Medical History  Past Medical History:   Diagnosis Date    A-fib (Multi)     Cough, unspecified     Cough    Disorder of prostate, unspecified     Prostate disorder    Impacted cerumen, bilateral 04/07/2017    Impacted cerumen of both ears    Personal history of other diseases of the musculoskeletal system and connective tissue     History of arthritis    Personal history of other diseases of the nervous system and sense organs     History of cataract    Personal history of other diseases of the nervous system and sense organs 12/27/2013    History of carpal tunnel syndrome    Personal history of other endocrine, nutritional and metabolic disease 06/06/2018    History of vitamin D deficiency    Syncope and collapse 10/09/2013    Vasovagal syncope       Surgical History  Past Surgical History:   Procedure Laterality Date    CARPAL TUNNEL RELEASE  04/02/2015    Neuroplasty Decompression Median Nerve At Carpal Tunnel    COLONOSCOPY  10/09/2013    Complete Colonoscopy    COLONOSCOPY  03/24/2014    Complete Colonoscopy    COLONOSCOPY  05/2013    rpt 5-23    KNEE ARTHROPLASTY  10/09/2013    Knee Arthroplasty    KNEE SURGERY  04/02/2013    Knee Surgery    KNEE SURGERY  10/11/2013    Knee Surgery    OTHER SURGICAL HISTORY  02/11/2020    Hand surgery    TOTAL KNEE ARTHROPLASTY  03/28/2014    Knee Replacement        Social  History  He reports that he has never smoked. He has never used smokeless tobacco. He reports that he does not drink alcohol and does not use drugs.    Family History  Family History   Problem Relation Name Age of Onset    Ovarian cancer Mother          Allergies  Patient has no known allergies.    Physical Exam:  Constitutional: alert oriented not in acute distress  HEENT: no oral lesions, mmm  CV: s1 and s2 present  Respiratory: not in acute distress  Extremities: warm and well perfused    Relevant Results  Pulmonary Function Tests:  4/3/2024   FEV1/FVC: 78  post FEV1:  90% pred T% pred  RV/T% pred  DLCO: 82% pred       Assessment/Plan   Kerwin Stark is a 84 y.o. male with pmhx significant for afib (on xarelto, last dose ) who has been following with pulmonary for hemoptysis; has had almost daily hemoptysis since April every morning, but not throughout the day. Recently diagnosed with cardiac amyloidosis. CT imaging concerning for basilar reticular changes vs atelectasis  L> R in 2024, but with no obvious exposures or other risk factors of ILD. PFTs do not show obstruction or restriction, very mild hyperinflation, normal dlco; additionally there is dependent interlobular septal thickening which likely is related to pulmonary edema. Imaging has improved slightly but the above abnormalities persist.    Last day xarelto 24.     - plan for bronchoscopy airway exam and BAL of lower lobes     Felecia Grullon MD

## 2024-09-16 NOTE — ANESTHESIA POSTPROCEDURE EVALUATION
Patient: Kerwin Stark    Procedure Summary       Date: 09/16/24 Room / Location: [facility]    Anesthesia Start: 1027 Anesthesia Stop: 1109    Procedure: BRONCHOSCOPY Diagnosis: Hemoptysis    Scheduled Providers: Hong Cantu MD Responsible Provider: Alden Pabon MD    Anesthesia Type: general ASA Status: 2            Anesthesia Type: general    Vitals Value Taken Time   /102 09/16/24 1107   Temp 36 °C (96.8 °F) 09/16/24 1107   Pulse 51 09/16/24 1107   Resp 11 09/16/24 1107   SpO2 98 % 09/16/24 1107       Anesthesia Post Evaluation    Patient location during evaluation: PACU  Patient participation: complete - patient participated  Level of consciousness: awake and alert  Pain score: 1  Pain management: adequate  Airway patency: patent  Cardiovascular status: acceptable  Respiratory status: acceptable  Hydration status: acceptable  Postoperative Nausea and Vomiting: none        There were no known notable events for this encounter.

## 2024-09-16 NOTE — ANESTHESIA PREPROCEDURE EVALUATION
Patient: Kerwin Stark    Procedure Information       Anesthesia Start Date/Time: 09/16/24 1027    Scheduled providers: Hong Cantu MD    Procedure: BRONCHOSCOPY    Location: [facility]            Relevant Problems   Cardiac   (+) Atrial fibrillation (Multi)   (+) Cardiac arrhythmia   (+) Hyperlipemia   (+) Longstanding persistent atrial fibrillation (Multi)   (+) Nonspecific chest pain      Neuro   (+) Carpal tunnel syndrome      /Renal   (+) Benign enlargement of prostate   (+) Prostate cancer (Multi)      Musculoskeletal   (+) Arthritis of hand, left, degenerative   (+) Carpal tunnel syndrome      ID   (+) Infected sebaceous cyst      Skin   (+) Basal cell carcinoma of skin of other part of trunk   (+) Basal cell carcinoma of skin of right upper limb, including shoulder   (+) Squamous cell carcinoma of skin of other parts of face       Clinical information reviewed:   Tobacco  Allergies  Meds   Med Hx  Surg Hx   Fam Hx  Soc Hx        NPO Detail:  NPO/Void Status  Date of Last Liquid: 09/15/24  Date of Last Solid: 09/15/24         Physical Exam    Airway  Mallampati: II  TM distance: >3 FB  Neck ROM: full     Cardiovascular - normal exam     Dental - normal exam     Pulmonary - normal exam     Abdominal            Anesthesia Plan    History of general anesthesia?: yes  History of complications of general anesthesia?: no    ASA 2     general     intravenous induction   Anesthetic plan and risks discussed with patient.    Plan discussed with CAA and attending.

## 2024-09-16 NOTE — ANESTHESIA PROCEDURE NOTES
Airway  Date/Time: 9/16/2024 10:32 AM  Urgency: elective    Airway not difficult    Staffing  Performed: VASQUEZ   Authorized by: Alden Pabon MD    Performed by: VASQUEZ Moore  Patient location during procedure: OR    Indications and Patient Condition  Indications for airway management: anesthesia  Spontaneous Ventilation: absent  Sedation level: deep  Preoxygenated: yes  Patient position: sniffing  MILS maintained throughout  Mask difficulty assessment: 1 - vent by mask    Final Airway Details  Final airway type: supraglottic airway      Successful airway: Supraglottic airway: iGel.  Size 5     Number of attempts at approach: 2    Additional Comments  1st attempted with #4 iGel. Obvious air leak could be heard. Unable to deliver adequate positive pressure ventilation. 2nd attempted with #5 iGel. Air leak reduced significantly and able to deliver adequate positive pressure ventilation.

## 2024-09-16 NOTE — DISCHARGE INSTRUCTIONS
[facility] Pulmonology    The anesthetics, sedatives or narcotics which were given to you today will be acting in your body for the next 24 hours, so you might feel a little sleepy or groggy. This feeling should slowly wear off.   Carefully read and follow the instructions below:   You received sedation today.   Do not drive or operate machinery or power tools of any kind.   No alcoholic beverages today, not even beer or wine.   No over the counter medications that contain alcohol or may cause drowsiness.   Do not make important decisions or sign legal documents.     Do not use Aspirin containing products or non-steroidal medications for the next 24 hours.  (Examples of these types of medications include: Advil, Aleve, Ecotrin, Ibuprofen, Motrin or Naprosyn.  This list is not all-inclusive.  Check with your physician or pharmacist before resuming these medications.)  Tylenol, cough medicine, cough drops or throat lozenges may be used when you are allowed to resume eating and drinking.     Call your physician if any of these symptoms occur:   High fever over 101 degrees or chills (a low grade fever is common for 24 hours)   Rash or hives   Persistent nausea or vomiting   Inability to urinate within 8 hours after the procedure  Go directly to the emergency room if you notice any of the following:   Shortness of breath   Chest pain  Coughing up large amounts of bright red blood greater than a teaspoonful of blood clots (about a teaspoonful for the next 24-48 hours is normal, especially if you had a biopsy)  Resume all normal medications unless directed otherwise by your doctor.     Your doctor recommends these additional instructions:      Follow up with your referring physician as previously scheduled.    If you experience any problems or have any questions following discharge, please call:   Before 5 pm: [phone]   After 5pm and on weekends: [phone] / [phone] and ask for  the Pulmonary Fellow on-call (Pager Number: [phone])

## 2024-09-17 LAB
LABORATORY COMMENT REPORT: NORMAL
LABORATORY COMMENT REPORT: NORMAL
PATH REPORT.FINAL DX SPEC: NORMAL
PATH REPORT.GROSS SPEC: NORMAL
PATH REPORT.TOTAL CANCER: NORMAL
RESIDENT REVIEW: NORMAL

## 2024-09-18 LAB
BACTERIA SPEC RESP CULT: NORMAL
GRAM STN SPEC: NORMAL
GRAM STN SPEC: NORMAL

## 2024-09-19 LAB
ACID FAST STN SPEC: NORMAL
MYCOBACTERIUM SPEC CULT: NORMAL

## 2024-09-20 LAB
FUNGUS SPEC CULT: NORMAL
FUNGUS SPEC FUNGUS STN: NORMAL

## 2024-09-23 ENCOUNTER — LAB (OUTPATIENT)
Dept: LAB | Facility: LAB | Age: 84
End: 2024-09-23
Payer: MEDICARE

## 2024-09-23 DIAGNOSIS — E78.2 MIXED HYPERLIPIDEMIA: ICD-10-CM

## 2024-09-23 DIAGNOSIS — N18.32 STAGE 3B CHRONIC KIDNEY DISEASE (MULTI): ICD-10-CM

## 2024-09-23 DIAGNOSIS — D64.9 ANEMIA, UNSPECIFIED TYPE: Primary | ICD-10-CM

## 2024-09-23 DIAGNOSIS — D64.9 ANEMIA, UNSPECIFIED TYPE: ICD-10-CM

## 2024-09-23 DIAGNOSIS — D47.2 MGUS (MONOCLONAL GAMMOPATHY OF UNKNOWN SIGNIFICANCE): ICD-10-CM

## 2024-09-23 DIAGNOSIS — R53.81 MALAISE: ICD-10-CM

## 2024-09-23 DIAGNOSIS — E55.9 VITAMIN D DEFICIENCY: ICD-10-CM

## 2024-09-23 LAB
25(OH)D3 SERPL-MCNC: 35 NG/ML (ref 30–100)
ALBUMIN SERPL BCP-MCNC: 3.7 G/DL (ref 3.4–5)
ALP SERPL-CCNC: 98 U/L (ref 33–136)
ALT SERPL W P-5'-P-CCNC: 16 U/L (ref 10–52)
ANION GAP SERPL CALC-SCNC: 12 MMOL/L (ref 10–20)
APPEARANCE UR: CLEAR
AST SERPL W P-5'-P-CCNC: 21 U/L (ref 9–39)
BASOPHILS # BLD AUTO: 0.08 X10*3/UL (ref 0–0.1)
BASOPHILS NFR BLD AUTO: 1.3 %
BILIRUB SERPL-MCNC: 0.8 MG/DL (ref 0–1.2)
BILIRUB UR STRIP.AUTO-MCNC: NEGATIVE MG/DL
BUN SERPL-MCNC: 25 MG/DL (ref 6–23)
CALCIUM SERPL-MCNC: 8.7 MG/DL (ref 8.6–10.6)
CHLORIDE SERPL-SCNC: 107 MMOL/L (ref 98–107)
CHOLEST SERPL-MCNC: 164 MG/DL (ref 0–199)
CHOLESTEROL/HDL RATIO: 2.6
CO2 SERPL-SCNC: 24 MMOL/L (ref 21–32)
COLOR UR: YELLOW
CREAT SERPL-MCNC: 1.27 MG/DL (ref 0.5–1.3)
CREAT UR-MCNC: 106.8 MG/DL (ref 20–370)
EGFRCR SERPLBLD CKD-EPI 2021: 56 ML/MIN/1.73M*2
EOSINOPHIL # BLD AUTO: 0.23 X10*3/UL (ref 0–0.4)
EOSINOPHIL NFR BLD AUTO: 3.8 %
ERYTHROCYTE [DISTWIDTH] IN BLOOD BY AUTOMATED COUNT: 13.8 % (ref 11.5–14.5)
FOLATE SERPL-MCNC: 13 NG/ML
FUNGUS SPEC CULT: NORMAL
FUNGUS SPEC FUNGUS STN: NORMAL
GLUCOSE SERPL-MCNC: 82 MG/DL (ref 74–99)
GLUCOSE UR STRIP.AUTO-MCNC: ABNORMAL MG/DL
HCT VFR BLD AUTO: 40.8 % (ref 41–52)
HDLC SERPL-MCNC: 62.4 MG/DL
HGB BLD-MCNC: 13.7 G/DL (ref 13.5–17.5)
IMM GRANULOCYTES # BLD AUTO: 0.05 X10*3/UL (ref 0–0.5)
IMM GRANULOCYTES NFR BLD AUTO: 0.8 % (ref 0–0.9)
KETONES UR STRIP.AUTO-MCNC: NEGATIVE MG/DL
LDLC SERPL CALC-MCNC: 83 MG/DL
LEUKOCYTE ESTERASE UR QL STRIP.AUTO: NEGATIVE
LYMPHOCYTES # BLD AUTO: 2.17 X10*3/UL (ref 0.8–3)
LYMPHOCYTES NFR BLD AUTO: 35.5 %
MCH RBC QN AUTO: 31.3 PG (ref 26–34)
MCHC RBC AUTO-ENTMCNC: 33.6 G/DL (ref 32–36)
MCV RBC AUTO: 93 FL (ref 80–100)
MICROALBUMIN UR-MCNC: <7 MG/L
MICROALBUMIN/CREAT UR: NORMAL MG/G{CREAT}
MONOCYTES # BLD AUTO: 0.51 X10*3/UL (ref 0.05–0.8)
MONOCYTES NFR BLD AUTO: 8.3 %
NEUTROPHILS # BLD AUTO: 3.07 X10*3/UL (ref 1.6–5.5)
NEUTROPHILS NFR BLD AUTO: 50.3 %
NITRITE UR QL STRIP.AUTO: NEGATIVE
NON HDL CHOLESTEROL: 102 MG/DL (ref 0–149)
NRBC BLD-RTO: 0 /100 WBCS (ref 0–0)
PH UR STRIP.AUTO: 5.5 [PH]
PLATELET # BLD AUTO: 238 X10*3/UL (ref 150–450)
POTASSIUM SERPL-SCNC: 4.3 MMOL/L (ref 3.5–5.3)
PROT SERPL-MCNC: 6.8 G/DL (ref 6.4–8.2)
PROT SERPL-MCNC: 6.8 G/DL (ref 6.4–8.2)
PROT UR STRIP.AUTO-MCNC: NEGATIVE MG/DL
RBC # BLD AUTO: 4.38 X10*6/UL (ref 4.5–5.9)
RBC # UR STRIP.AUTO: NEGATIVE /UL
SODIUM SERPL-SCNC: 139 MMOL/L (ref 136–145)
SP GR UR STRIP.AUTO: 1.02
TRIGL SERPL-MCNC: 92 MG/DL (ref 0–149)
TSH SERPL-ACNC: 2.88 MIU/L (ref 0.44–3.98)
URATE SERPL-MCNC: 6.2 MG/DL (ref 4–7.5)
UROBILINOGEN UR STRIP.AUTO-MCNC: NORMAL MG/DL
VIT B12 SERPL-MCNC: 384 PG/ML (ref 211–911)
VLDL: 18 MG/DL (ref 0–40)
WBC # BLD AUTO: 6.1 X10*3/UL (ref 4.4–11.3)

## 2024-09-23 PROCEDURE — 84155 ASSAY OF PROTEIN SERUM: CPT

## 2024-09-23 PROCEDURE — 82746 ASSAY OF FOLIC ACID SERUM: CPT

## 2024-09-23 PROCEDURE — 84165 PROTEIN E-PHORESIS SERUM: CPT | Performed by: INTERNAL MEDICINE

## 2024-09-23 PROCEDURE — 81003 URINALYSIS AUTO W/O SCOPE: CPT

## 2024-09-23 PROCEDURE — 80053 COMPREHEN METABOLIC PANEL: CPT

## 2024-09-23 PROCEDURE — 80061 LIPID PANEL: CPT

## 2024-09-23 PROCEDURE — 85025 COMPLETE CBC W/AUTO DIFF WBC: CPT

## 2024-09-23 PROCEDURE — 82570 ASSAY OF URINE CREATININE: CPT

## 2024-09-23 PROCEDURE — 82607 VITAMIN B-12: CPT

## 2024-09-23 PROCEDURE — 83521 IG LIGHT CHAINS FREE EACH: CPT

## 2024-09-23 PROCEDURE — 36415 COLL VENOUS BLD VENIPUNCTURE: CPT

## 2024-09-23 PROCEDURE — 84165 PROTEIN E-PHORESIS SERUM: CPT

## 2024-09-23 PROCEDURE — 82043 UR ALBUMIN QUANTITATIVE: CPT

## 2024-09-23 PROCEDURE — 82306 VITAMIN D 25 HYDROXY: CPT

## 2024-09-23 PROCEDURE — 84443 ASSAY THYROID STIM HORMONE: CPT

## 2024-09-23 PROCEDURE — 84550 ASSAY OF BLOOD/URIC ACID: CPT

## 2024-09-24 LAB
KAPPA LC SERPL-MCNC: 5.67 MG/DL (ref 0.33–1.94)
KAPPA LC/LAMBDA SER: 2.57 {RATIO} (ref 0.26–1.65)
LAMBDA LC SERPL-MCNC: 2.21 MG/DL (ref 0.57–2.63)

## 2024-09-25 LAB
ACID FAST STN SPEC: NORMAL
MYCOBACTERIUM SPEC CULT: NORMAL

## 2024-09-26 ENCOUNTER — APPOINTMENT (OUTPATIENT)
Dept: PULMONOLOGY | Facility: CLINIC | Age: 84
End: 2024-09-26
Payer: MEDICARE

## 2024-09-26 VITALS
SYSTOLIC BLOOD PRESSURE: 130 MMHG | TEMPERATURE: 97.3 F | OXYGEN SATURATION: 96 % | DIASTOLIC BLOOD PRESSURE: 75 MMHG | BODY MASS INDEX: 23.43 KG/M2 | WEIGHT: 168 LBS | HEART RATE: 64 BPM

## 2024-09-26 DIAGNOSIS — R53.83 OTHER FATIGUE: Primary | ICD-10-CM

## 2024-09-26 DIAGNOSIS — R05.9 COUGH, UNSPECIFIED TYPE: ICD-10-CM

## 2024-09-26 LAB
ALBUMIN: 3.7 G/DL (ref 3.4–5)
ALPHA 1 GLOBULIN: 0.3 G/DL (ref 0.2–0.6)
ALPHA 2 GLOBULIN: 0.7 G/DL (ref 0.4–1.1)
BETA GLOBULIN: 0.7 G/DL (ref 0.5–1.2)
GAMMA GLOBULIN: 1.5 G/DL (ref 0.5–1.4)
M-PROTEIN 1: 0.5 G/DL
PATH REVIEW-SERUM PROTEIN ELECTROPHORESIS: ABNORMAL
PROTEIN ELECTROPHORESIS COMMENT: ABNORMAL

## 2024-09-26 PROCEDURE — 99213 OFFICE O/P EST LOW 20 MIN: CPT | Performed by: STUDENT IN AN ORGANIZED HEALTH CARE EDUCATION/TRAINING PROGRAM

## 2024-09-26 PROCEDURE — 1126F AMNT PAIN NOTED NONE PRSNT: CPT | Performed by: STUDENT IN AN ORGANIZED HEALTH CARE EDUCATION/TRAINING PROGRAM

## 2024-09-26 ASSESSMENT — ENCOUNTER SYMPTOMS
ANAL BLEEDING: 0
ABDOMINAL PAIN: 0
RECTAL PAIN: 0
ABDOMINAL DISTENTION: 0
BLOOD IN STOOL: 0
DIARRHEA: 0
CHILLS: 0
SHORTNESS OF BREATH: 1
NAUSEA: 0
UNEXPECTED WEIGHT CHANGE: 0
CONSTIPATION: 0
COLOR CHANGE: 0
VOMITING: 0
FEVER: 0
TROUBLE SWALLOWING: 0

## 2024-09-26 ASSESSMENT — PAIN SCALES - GENERAL: PAINLEVEL: 0-NO PAIN

## 2024-09-26 NOTE — PATIENT INSTRUCTIONS
Thank you for visiting the Pulmonary Clinic today.   Your breathing medications: take albuterol as needed can use it at night to help   Tests: Home sleep study   Return in 3-4 months   If you have questions or concerns, call [phone] (option 4)

## 2024-09-26 NOTE — PROGRESS NOTES
Department of Medicine I Division of Pulmonary, Critical Care, and Sleep Medicine   [id number] [place] [address] [address] [place] [address]  Phone: [phone]  Fax: [phone]    History of Present Illness   Kerwin Stark is a 84 y.o. male presenting with abnormal CT chest findings.     9/2024   Since the last visit   Saw ent who did NPL--no upper airway source of bleeding   Set up for bronch with BAL in [facility]---no obvious source of bleeding although there were some streaky bloody secretions in the airway,  he had a lymphoctic predominant cell count and cytology was normal and no fungal elements seen on silver stain.  Negative cultures thus far   Patient endorses night time hemoptysis (only), none during the day time   Also notes that his phlegm is slick  He does snore at night and increased fatigue in the morning   Will be seeing Dr. Smith Solares cardiology at [facility] for his cardiology care   Last echo visible in care everywhere from 2/2023--noted to have low normal LVEF with low normal RV EF with moderate MR and dilated IVC ;  mentions that he may have had an echo through the research protocol he is in recently       5/2024  Since the last visit   PFT showed no obstruction and mild hyperinflation   HRCT chest showed dependent interlobular septal thickening, mild reticulations and pleural plaques   No known asbestos exposure   We sent request for walk test from [facility] but they could not find any record of a walk test there   He is is still having daily early morning hemoptysis--the size of a quarter--only happens first thing in the morning--once, does not happen later in the day or at any other time   Is on diuretics   Denies nosebleeds       2/2024  Referred by:  Dr. Noland  for abnormal CT chest findings. I have independently interviewed and examined the patient in the office and reviewed available records.    Here with his wife   Endorses months of having productive cough with grey phlegm and sometimes has a dark  brown jelly like substance; this is the first time it happened to him  Last time he coughed up the dark brown substance was this morning --one speck in a glob of clear mucous   Was treated with antibiotics for a possible pneumonia; feels like it may have made improvement to the quality of the cough   Cough doesn't bother him at night,  mostly in the morning; denies PND or sinus drainage, no nose bleeds  Denies recurrent bronchitis   Denies any recent hospitalizations   He does get dyspnea with activity --mostly he has attributed it to his afib since it has been a longstanding problem for him   He was previously on tikosyn , he has never been on amiodarone     Does stay active--walk 5-6 miles a day, usually its tough when he first starts walking, but then eases up     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 Leticia Yuan at [facility] --is in a clinical trial(CARDIOTTRANSFORM-Eplontersen vs placebo); sees every 3 months ; did have a couple of 6mw as part of the protocol     Pulmonary medications: none   Review of Systems  Review of Systems   Constitutional:  Negative for chills, fever and unexpected weight change.   HENT:  Negative for trouble swallowing.    Respiratory:  Positive for shortness of breath.    Cardiovascular:  Negative for chest pain.   Gastrointestinal:  Negative for abdominal distention, abdominal pain, anal bleeding, blood in stool, constipation, diarrhea, nausea, rectal pain and vomiting.   Skin:  Negative for color change.     All other review of systems are negative and/or non-contributory.    Past Medical History   He has a past medical history of A-fib (Multi), Cough, unspecified, Disorder of prostate, unspecified, Impacted cerumen, bilateral (04/07/2017), Personal history of other diseases of the musculoskeletal system and connective tissue, Personal history of other diseases of the nervous system and sense organs, Personal history of other diseases of the nervous system and  sense organs (12/27/2013), Personal history of other endocrine, nutritional and metabolic disease (06/06/2018), and Syncope and collapse (10/09/2013).    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       Immunizations     Immunization History   Administered Date(s) Administered    Flu vaccine, trivalent, preservative free, HIGH-DOSE, age 65y+ (Fluzone) 10/26/2015, 09/28/2016, 10/10/2018, 10/16/2019, 10/17/2022    Influenza, Seasonal, Quadrivalent, Adjuvanted 10/15/2021, 10/16/2023    Pfizer COVID-19 vaccine, 12 years and older, (30mcg/0.3mL) (Comirnaty) 10/16/2023, 09/13/2024    Pfizer COVID-19 vaccine, bivalent, age 12 years and older (30 mcg/0.3 mL) 11/02/2022, 08/09/2023    Pfizer Gray Cap SARS-CoV-2 06/15/2022    Pfizer Purple Cap SARS-CoV-2 01/27/2021, 03/18/2021, 10/24/2021    Pneumococcal Conjugate PCV 7 1942    Pneumococcal conjugate vaccine, 13-valent (PREVNAR 13) 05/18/2016    Pneumococcal polysaccharide vaccine, 23-valent, age 2 years and older (PNEUMOVAX 23) 05/20/2010, 10/09/2010, 02/18/2021    Zoster vaccine, recombinant, adult (SHINGRIX) 04/10/2019, 05/17/2019    Zoster, live 10/09/2012, 11/07/2012       Medications and Allergies     Current Outpatient Medications   Medication Instructions    albuterol (ProAir HFA) 90 mcg/actuation inhaler 2 puffs, inhalation, Every 4 hours PRN    beta carotene (VITAMIN A) 10,000 Units, oral, Every 72 hours    empagliflozin (JARDIANCE) 10 mg, oral, Daily RT    spironolactone (ALDACTONE) 25 mg    tafamidis (VYNDAMAX) 61 mg, oral, Daily    torsemide (DEMADEX) 10 mg, oral, Daily    Xarelto 20 mg tablet TAKE 1 TABLET BY MOUTH DAILY      Patient has no known allergies.    Social History   He reports that he has never smoked. He has never used smokeless tobacco. He reports that he does not drink alcohol and does not use drugs.    Smoking History: never smoker  Exposure/Job History:  (indoor and outdoor), Grew up in  [place]--immigrated here in 1962. Did live in [place], [place]       Family History     Family History   Problem Relation Name Age of Onset    Ovarian cancer Mother         Surgical History   He has a past surgical history that includes Knee surgery (2013); Other surgical history (2020); Carpal tunnel release (2015); Colonoscopy (10/09/2013); Knee Arthroplasty (10/09/2013); Colonoscopy (2014); Total knee arthroplasty (2014); Knee surgery (10/11/2013); and Colonoscopy (2013).    Physical Exam     There were no vitals filed for this visit.          Physical Exam  Constitutional:       Appearance: Normal appearance.   HENT:      Head: Normocephalic and atraumatic.   Eyes:      Pupils: Pupils are equal, round, and reactive to light.   Cardiovascular:      Rate and Rhythm: Normal rate and regular rhythm.   Pulmonary:      Effort: Pulmonary effort is normal.      Breath sounds: Normal breath sounds.   Skin:     Findings: No rash.   Neurological:      General: No focal deficit present.      Mental Status: He is alert and oriented to person, place, and time.   Psychiatric:         Mood and Affect: Mood normal.          Results   Pulmonary Function Tests:  4/3/2024   FEV1/FVC: 78  post FEV1:  90% pred T% pred  RV/T% pred  DLCO: 82% pred    Chest Radiograph:  XR chest 2 views 2024    Narrative  Interpreted By:  Zeina Uribe,  STUDY:  XR CHEST 2 VIEWS;    INDICATION:  Signs/Symptoms:Cough x a few weeks.    COMPARISON:  Chest radiograph dated 2020    ACCESSION NUMBER(S):  [id number]    ORDERING CLINICIAN:  MARA BRAVO    FINDINGS:  The cardiac silhouette size is diffusely enlarged. Bilateral lungs  demonstrate hyperinflation with coarse interstitial markings,  suggestive of chronic lung parenchymal changes. There is ill-defined  airspace opacity in the left lung base/retrocardiac region, new  compared to prior radiograph dating back to . This is  concerning  for pneumonia. Large pleural effusions or pneumothorax.    Impression  Findings suggestive of pneumonia in the retrocardiac region. An  underlying neoplastic process can not be completely excluded,  especially given background lung parenchymal changes. Recommend  follow-up radiograph in 3-6 weeks to demonstrate resolution.    Signed by: Zeina Uribe 1/3/2024 8:41 PM  Dictation workstation:   [id number]      Chest CT Scan:  1/26/2024  5mm cuts  IMPRESSION:  1.  Reticular and ground-glass opacities within the bilateral lower  lobes, left-greater-than-right, favored to reflect a combination of  fibrosis and atelectasis. Otherwise, no acute cardiopulmonary process.  2. There are a few subcentimeter pulmonary nodules measuring up to  0.7 cm as described above. Recommend short interval CT chest  follow-up in 3 months for re-evaluation.  3. Scattered pleural calcifications of the bilateral lungs, correlate  with history of asbestos exposure.  4. Aneurysmal dilatation of the ascending thoracic aorta measuring up  to 4.4 cm in diameter.  5. Mild cardiomegaly.    HRCT  4/24/2024  Narrative & Impression   Interpreted By:  John Costa,   STUDY:  CT CHEST HIGH RESOLUTION;  4/24/2024 8:27 am      INDICATION:  Signs/Symptoms:follow up lung nodules and reticular changes, 1mm  cuts, prone and supine, expiratory films.      COMPARISON:  None.      ACCESSION NUMBER(S):  [id number]      ORDERING CLINICIAN:  ELIECER HERNANDEZ      TECHNIQUE:  Using helical multidetector technique, volumetric data acquisition of  the chest was obtained without intravenous administration of contrast  material under the high resolution chest CT protocol. Examination  includes contiguous slices through the chest in supine positioning  during inspiration, noncontiguous axial slices in supine positioning  during expiration and prone positioning during inspiration.      FINDINGS:  LUNGS AND AIRWAYS:  The trachea and central airways  are patent. No endobronchial lesion  is seen.      There is trace bilateral pleural effusion.      Scattered bilateral calcified pleural plaques.      There is interlobular septal thickening and faint ground-glass  opacity predominantly in the lower lobes, left more than right.      There is mild subpleural reticulation with no evidence of  bronchiectasis or bronchiolectasis. No honeycombing.      Mild air trapping in the expiratory phase images.      6 mm nodular density along the left major fissure, image 163/370,  likely a lymph node and unchanged compared to prior study.      7 mm left lower lobe nodular density, image 260/370, not definitely  seen on the prior study.      MEDIASTINUM AND EDGAR, LOWER NECK AND AXILLA:  The visualized thyroid gland is within normal limits.  No evidence of thoracic lymphadenopathy by CT criteria.  Esophagus appears within normal limits as seen.      HEART AND VESSELS:  Mildly dilated ascending thoracic aorta measuring 4.2 cm.There is  mild scattered calcified atherosclerosis present. Slightly dilated  main pulmonary artery measuring 3.3 cm. Mild coronary artery  calcifications are seen.Please note,the study is not optimized for  evaluation of coronary arteries. Heart size is enlarged mainly due to  biatrial enlargement, right more than left. There is no pericardial  effusion seen.      UPPER ABDOMEN:  2.1 cm hypodense lesion in the right kidney, likely a renal cyst.              CHEST WALL AND OSSEOUS STRUCTURES:  Chest wall is within normal limits.  No acute osseous pathology.There are no suspicious osseous lesions.      Mild compression deformity of T1, T7, T8 and T12 vertebral bodies are  stable. Mild diffuse osteopenia.      IMPRESSION:  1. Mild subpleural reticulation with no evidence of bronchiectasis or  bronchiolectasis and no evidence of honeycombing. Findings may  represent mild nonspecific fibrosis in a pattern inconsistent with  UIP.  2. Interlobular septal thickening  "and ground-glass opacity  predominantly in the lower lobes and left more than right, not  significantly different. Findings may be due to component of  interstitial pulmonary edema. There is also trace bilateral pleural  effusion, new compared to prior study. Mild multifocal air trapping  which can be due to component of airway disease.  3. There is 7 mm left lower lobe pulmonary nodule, not definitely  seen on the prior study and might be a focal area of atelectasis.  Recommend however short-term follow-up chest CT in 3-6 months for  continued surveillance.  4. Multiple bilateral calcified pleural plaques raising concern for  prior asbestos exposure.  5. Cardiomegaly mainly due to biatrial enlargement with mild coronary  artery calcification. Slightly dilated main pulmonary artery which  can be seen in pulmonary artery hypertension.  6. Mildly dilated ascending thoracic aorta measuring 4.2 cm.  7. Other stable findings as described above.       ECHO:  No results found for this or any previous visit from the past 365 days.       Labs:  Lab Results   Component Value Date    WBC 6.1 09/23/2024    HGB 13.7 09/23/2024    HCT 40.8 (L) 09/23/2024    MCV 93 09/23/2024     09/23/2024       Lab Results   Component Value Date    CREATININE 1.27 09/23/2024    BUN 25 (H) 09/23/2024     09/23/2024    K 4.3 09/23/2024     09/23/2024    CO2 24 09/23/2024        Lab Results   Component Value Date    SEDRATE 30 (H) 10/19/2022        IgE: No results found for: \"IGE\"   Respiratory Allergy Panel:  AEC:   Eosinophils Absolute (x10*3/uL)   Date Value   09/23/2024 0.23     Eosinophils % (%)   Date Value   09/23/2024 3.8       Cultures:  AFB Culture   Date Value Ref Range Status   09/16/2024   Preliminary    Culture in progress and will be examined weekly. A result will be issued either when positive or after 8 weeks incubation.      No results found for: \"RESPCULTCYFI\"    Susceptibility data from last 90 " days.  Collected Specimen Info Organism   09/16/24 Fluid from BRONCHO-ALVEOLAR LAVAGE OF LEFT LOWER LOBE Mixed Microorganisms Resembling Upper Respiratory Floresita    C     Assessment and Plan       Basilar reticular changes vs atelectasis  L> R ; evaluation is limited given low resolution of the scan (5mm cuts) --ATTR unusual to manifest in the lungs but can be possible ( can affect the interstitium, tracheobronchial involvement is also possible).  No other obvious exposures or other risk factors of ILD.   HRCT from  is showing mild subpleural reticulations--PFTs do not show obstruction or restriction,very mild hyperinflation, normal dlco; additionally there is dependent interlobular septal thickening which likely is related to pulmonary edema   Pleural plaques --no known asbestos exposure; could be related to amyloidosis   Lung nodules 6-7mm   Night-time hemoptysis---no upper airway lesions or tracheobronchial lesions noted on ENT scope  and bronchoscopy;  BAL with lymphocytic predominance , cultures negative thus far     Recommendations  In the absence of any obvious airway lesion causing bleeding suspect this could be related to pulmonary edema in the setting of being on anticoagulation therapy, would be useful to see what his function is on most recent echo; will also wait for final result on fungal and afb culture  We can consider doing CT angio to evaluate for any pseudoaneurysms in the airway or av malformations contributing if echo is equivocal   HSAT to assess for KEILA --may help with pulmonary edema as well   PFTs are underwhelming and he has mild subpleural reticulations and pleural plaques--will continue to monitor clinically at this time   Albuterol inh to help with clearance   RTC in 3-4 months     Heather Machado MD  09/26/2024

## 2024-09-30 LAB
FUNGUS SPEC CULT: NORMAL
FUNGUS SPEC FUNGUS STN: NORMAL

## 2024-10-02 LAB
ACID FAST STN SPEC: NORMAL
MYCOBACTERIUM SPEC CULT: NORMAL

## 2024-10-07 ENCOUNTER — APPOINTMENT (OUTPATIENT)
Dept: UROLOGY | Facility: CLINIC | Age: 84
End: 2024-10-07
Payer: MEDICARE

## 2024-10-07 DIAGNOSIS — C61 PROSTATE CANCER (MULTI): Primary | ICD-10-CM

## 2024-10-07 DIAGNOSIS — N40.1 BENIGN PROSTATIC HYPERPLASIA WITH URINARY FREQUENCY: ICD-10-CM

## 2024-10-07 DIAGNOSIS — R35.0 BENIGN PROSTATIC HYPERPLASIA WITH URINARY FREQUENCY: ICD-10-CM

## 2024-10-07 LAB
FUNGUS SPEC CULT: NORMAL
FUNGUS SPEC FUNGUS STN: NORMAL

## 2024-10-07 PROCEDURE — 99213 OFFICE O/P EST LOW 20 MIN: CPT | Performed by: UROLOGY

## 2024-10-07 PROCEDURE — G2211 COMPLEX E/M VISIT ADD ON: HCPCS | Performed by: UROLOGY

## 2024-10-07 NOTE — PROGRESS NOTES
Subjective     This visit was completed via telemedicine. All issues as below were discussed and addressed but no physical exam was performed unless allowed by visual confirmation. If it was felt that the patient should be evaluated in clinic, then they were directed there. Patient verbally consented to visit.      Kerwin Stark is a 84 y.o. male with history of BPH s/p HoLEP (12/2022) with incidentally found [place] 6 prostate cancer and post-op stress incontinence. Patient presents today for a follow up visit. He has occasional stress incontinence. Denies any recent gross hematuria, fevers, chills, urinary retention, intractable flank or abdominal pain, nausea or vomiting.            Past Medical History:   Diagnosis Date    A-fib (Multi)     Cough, unspecified     Cough    Disorder of prostate, unspecified     Prostate disorder    Impacted cerumen, bilateral 04/07/2017    Impacted cerumen of both ears    Personal history of other diseases of the musculoskeletal system and connective tissue     History of arthritis    Personal history of other diseases of the nervous system and sense organs     History of cataract    Personal history of other diseases of the nervous system and sense organs 12/27/2013    History of carpal tunnel syndrome    Personal history of other endocrine, nutritional and metabolic disease 06/06/2018    History of vitamin D deficiency    Syncope and collapse 10/09/2013    Vasovagal syncope     Past Surgical History:   Procedure Laterality Date    CARPAL TUNNEL RELEASE  04/02/2015    Neuroplasty Decompression Median Nerve At Carpal Tunnel    COLONOSCOPY  10/09/2013    Complete Colonoscopy    COLONOSCOPY  03/24/2014    Complete Colonoscopy    COLONOSCOPY  05/2013    rpt 5-23    KNEE ARTHROPLASTY  10/09/2013    Knee Arthroplasty    KNEE SURGERY  04/02/2013    Knee Surgery    KNEE SURGERY  10/11/2013    Knee Surgery    OTHER SURGICAL HISTORY  02/11/2020    Hand surgery    TOTAL KNEE ARTHROPLASTY   03/28/2014    Knee Replacement     Family History   Problem Relation Name Age of Onset    Ovarian cancer Mother       Current Outpatient Medications   Medication Sig Dispense Refill    albuterol (ProAir HFA) 90 mcg/actuation inhaler Inhale 2 puffs every 4 hours if needed for wheezing or shortness of breath. 8.5 g 5    beta carotene (vitamin A) 3,000 mcg (10,000 unit) capsule Take 1 capsule (10,000 Units) by mouth every 3rd day.      empagliflozin (Jardiance) 10 mg Take 1 tablet (10 mg) by mouth once daily.      spironolactone (Aldactone) 25 mg tablet 1 tablet (25 mg).      tafamidis (Vyndamax) 61 mg capsule Take 1 capsule (61 mg) by mouth once daily. 30 capsule 11    torsemide (Demadex) 10 mg tablet Take 1 tablet (10 mg) by mouth once daily.      Xarelto 20 mg tablet TAKE 1 TABLET BY MOUTH DAILY 100 tablet 2     No current facility-administered medications for this visit.     No Known Allergies  Social History     Socioeconomic History    Marital status:      Spouse name: Not on file    Number of children: 1    Years of education: Not on file    Highest education level: Not on file   Occupational History    Occupation: Retired    Tobacco Use    Smoking status: Never    Smokeless tobacco: Never   Substance and Sexual Activity    Alcohol use: Never    Drug use: Never    Sexual activity: Not on file   Other Topics Concern    Not on file   Social History Narrative    Not on file     Social Determinants of Health     Financial Resource Strain: Not on file   Food Insecurity: Not on file   Transportation Needs: Not on file   Physical Activity: Sufficiently Active (8/30/2023)    Exercise Vital Sign     Days of Exercise per Week: 7 days     Minutes of Exercise per Session: 60 min   Stress: Not on file   Social Connections: Not on file   Intimate Partner Violence: Not on file   Housing Stability: Not on file       Review of Systems  Pertinent items are noted in HPI.    Objective       Lab Review  Lab  Results   Component Value Date    WBC 6.1 09/23/2024    RBC 4.38 (L) 09/23/2024    HGB 13.7 09/23/2024    HCT 40.8 (L) 09/23/2024     09/23/2024      Lab Results   Component Value Date    BUN 25 (H) 09/23/2024    CREATININE 1.27 09/23/2024      Lab Results   Component Value Date    PSA <0.10 07/31/2024           Assessment/Plan   There are no diagnoses linked to this encounter.  BPH with LUTs s/p HoLEP (12/2022)   Incidentally found [place] 6 prostate cancer     PSA is <0.10, undetectable.     We will repeat PSA in 1 year.     Follow up annually.     Stress incontinence     We will refer patient to PFPT for further management.     All questions were answered to the patient's satisfaction. Patient agrees with the plan and wishes to proceed. Follow-up will be scheduled appropriately.     E&M visit today is associated with current or anticipated ongoing medical care services related to a patient's single, serious condition or a complex condition.      Scribed for Dr. Byers by Carly Lynne. I , Dr Byers, have personally reviewed and agreed with the information entered by the Virtual Scribe.

## 2024-10-09 ENCOUNTER — PROCEDURE VISIT (OUTPATIENT)
Dept: SLEEP MEDICINE | Facility: HOSPITAL | Age: 84
End: 2024-10-09
Payer: MEDICARE

## 2024-10-09 DIAGNOSIS — R53.83 OTHER FATIGUE: ICD-10-CM

## 2024-10-09 LAB
ACID FAST STN SPEC: NORMAL
MYCOBACTERIUM SPEC CULT: NORMAL

## 2024-10-09 PROCEDURE — 95806 SLEEP STUDY UNATT&RESP EFFT: CPT | Performed by: PSYCHIATRY & NEUROLOGY

## 2024-10-16 LAB
ACID FAST STN SPEC: NORMAL
MYCOBACTERIUM SPEC CULT: NORMAL

## 2024-10-23 LAB
ACID FAST STN SPEC: NORMAL
MYCOBACTERIUM SPEC CULT: NORMAL

## 2024-10-25 ENCOUNTER — TELEPHONE (OUTPATIENT)
Dept: PRIMARY CARE | Facility: CLINIC | Age: 84
End: 2024-10-25
Payer: MEDICARE

## 2024-10-25 NOTE — TELEPHONE ENCOUNTER
Department of Anesthesiology  Postprocedure Note    Patient: Monica Augustin  MRN: [id number]  YOB: 1951  Date of evaluation: 7/12/2023      Procedure Summary     Date: 07/12/23 Room / Location: [address] [place] [address]    Anesthesia Start: 3836 Anesthesia Stop: 1717    Procedure: COLONOSCOPY POLYPECTOMY SNARE/COLD BIOPSY  clip x 1 (Abdomen) Diagnosis:       Screening for colon cancer      (Screening for colon cancer [Z12.11])    Surgeons: Emory Hooker MD Responsible Provider: DELBERT Guevara CRNA    Anesthesia Type: general, TIVA ASA Status: 2          Anesthesia Type: No value filed.     Rito Phase I: Rito Score: 9    Rito Phase II: Rito Score: 10      Anesthesia Post Evaluation    Patient location during evaluation: bedside  Patient participation: complete - patient participated  Level of consciousness: awake and alert  Airway patency: patent  Nausea & Vomiting: no nausea and no vomiting  Complications: no  Cardiovascular status: hemodynamically stable  Respiratory status: acceptable  Hydration status: stable  Multimodal analgesia pain management approach Patient called and stated that he missed a call from you and wanted speak with you. Did not state what the nature of the call was for.

## 2024-10-30 ENCOUNTER — APPOINTMENT (OUTPATIENT)
Dept: DERMATOLOGY | Facility: CLINIC | Age: 84
End: 2024-10-30
Payer: MEDICARE

## 2024-10-30 DIAGNOSIS — Z85.820 PERSONAL HISTORY OF MALIGNANT MELANOMA OF SKIN: ICD-10-CM

## 2024-10-30 DIAGNOSIS — D48.5 NEOPLASM OF UNCERTAIN BEHAVIOR OF SKIN: ICD-10-CM

## 2024-10-30 DIAGNOSIS — I87.8 VENOUS STASIS: ICD-10-CM

## 2024-10-30 DIAGNOSIS — Z12.83 SCREENING EXAM FOR SKIN CANCER: ICD-10-CM

## 2024-10-30 DIAGNOSIS — D22.9 MULTIPLE BENIGN NEVI: ICD-10-CM

## 2024-10-30 DIAGNOSIS — L57.0 ACTINIC KERATOSIS: Primary | ICD-10-CM

## 2024-10-30 DIAGNOSIS — L81.4 LENTIGO: ICD-10-CM

## 2024-10-30 LAB
ACID FAST STN SPEC: NORMAL
MYCOBACTERIUM SPEC CULT: NORMAL

## 2024-10-30 ASSESSMENT — DERMATOLOGY QUALITY OF LIFE (QOL) ASSESSMENT
RATE HOW EMOTIONALLY BOTHERED YOU ARE BY YOUR SKIN PROBLEM (FOR EXAMPLE, WORRY, EMBARRASSMENT, FRUSTRATION): 0 - NEVER BOTHERED
RATE HOW BOTHERED YOU ARE BY EFFECTS OF YOUR SKIN PROBLEMS ON YOUR ACTIVITIES (EG, GOING OUT, ACCOMPLISHING WHAT YOU WANT, WORK ACTIVITIES OR YOUR RELATIONSHIPS WITH OTHERS): 0 - NEVER BOTHERED
RATE HOW BOTHERED YOU ARE BY SYMPTOMS OF YOUR SKIN PROBLEM (EG, ITCHING, STINGING BURNING, HURTING OR SKIN IRRITATION): 0 - NEVER BOTHERED
ARE THERE EXCLUSIONS OR EXCEPTIONS FOR THE QUALITY OF LIFE ASSESSMENT: NO
DATE THE QUALITY-OF-LIFE ASSESSMENT WAS COMPLETED: 67143

## 2024-10-30 ASSESSMENT — ITCH NUMERIC RATING SCALE: HOW SEVERE IS YOUR ITCHING?: 0

## 2024-10-30 ASSESSMENT — DERMATOLOGY PATIENT ASSESSMENT
HAVE YOU HAD OR DO YOU HAVE A STAPH INFECTION: NO
ARE YOU AN ORGAN TRANSPLANT RECIPIENT: NO
HAVE YOU HAD OR DO YOU HAVE VASCULAR DISEASE: NO
DO YOU USE A TANNING BED: NO
DO YOU USE SUNSCREEN: DAILY
DO YOU HAVE ANY NEW OR CHANGING LESIONS: NO

## 2024-10-30 ASSESSMENT — PATIENT GLOBAL ASSESSMENT (PGA): PATIENT GLOBAL ASSESSMENT: PATIENT GLOBAL ASSESSMENT:  1 - CLEAR

## 2024-10-31 ENCOUNTER — CLINICAL SUPPORT (OUTPATIENT)
Dept: DERMATOLOGY | Facility: CLINIC | Age: 84
End: 2024-10-31
Payer: MEDICARE

## 2024-10-31 ENCOUNTER — TELEPHONE (OUTPATIENT)
Dept: DERMATOLOGY | Facility: CLINIC | Age: 84
End: 2024-10-31
Payer: MEDICARE

## 2024-10-31 DIAGNOSIS — G47.33 OBSTRUCTIVE SLEEP APNEA: Primary | ICD-10-CM

## 2024-11-06 LAB
ACID FAST STN SPEC: NORMAL
MYCOBACTERIUM SPEC CULT: NORMAL

## 2024-11-12 ENCOUNTER — APPOINTMENT (OUTPATIENT)
Dept: PRIMARY CARE | Facility: CLINIC | Age: 84
End: 2024-11-12
Payer: MEDICARE

## 2024-11-12 VITALS
HEART RATE: 68 BPM | WEIGHT: 162 LBS | BODY MASS INDEX: 22.59 KG/M2 | SYSTOLIC BLOOD PRESSURE: 120 MMHG | DIASTOLIC BLOOD PRESSURE: 76 MMHG

## 2024-11-12 DIAGNOSIS — R23.3 ABNORMAL BRUISING: ICD-10-CM

## 2024-11-12 DIAGNOSIS — C44.519 BASAL CELL CARCINOMA OF SKIN OF OTHER PART OF TRUNK: ICD-10-CM

## 2024-11-12 DIAGNOSIS — E78.2 MIXED HYPERLIPIDEMIA: ICD-10-CM

## 2024-11-12 DIAGNOSIS — R35.0 BENIGN PROSTATIC HYPERPLASIA WITH URINARY FREQUENCY: ICD-10-CM

## 2024-11-12 DIAGNOSIS — D47.2 MGUS (MONOCLONAL GAMMOPATHY OF UNKNOWN SIGNIFICANCE): ICD-10-CM

## 2024-11-12 DIAGNOSIS — I48.11 LONGSTANDING PERSISTENT ATRIAL FIBRILLATION (MULTI): ICD-10-CM

## 2024-11-12 DIAGNOSIS — E85.4 CARDIAC AMYLOIDOSIS: ICD-10-CM

## 2024-11-12 DIAGNOSIS — J02.9 PHARYNGITIS, UNSPECIFIED ETIOLOGY: Primary | ICD-10-CM

## 2024-11-12 DIAGNOSIS — N40.1 BENIGN PROSTATIC HYPERPLASIA WITH URINARY FREQUENCY: ICD-10-CM

## 2024-11-12 DIAGNOSIS — I43 CARDIAC AMYLOIDOSIS: ICD-10-CM

## 2024-11-12 PROCEDURE — G2211 COMPLEX E/M VISIT ADD ON: HCPCS | Performed by: INTERNAL MEDICINE

## 2024-11-12 PROCEDURE — 1036F TOBACCO NON-USER: CPT | Performed by: INTERNAL MEDICINE

## 2024-11-12 PROCEDURE — 99214 OFFICE O/P EST MOD 30 MIN: CPT | Performed by: INTERNAL MEDICINE

## 2024-11-12 PROCEDURE — 1160F RVW MEDS BY RX/DR IN RCRD: CPT | Performed by: INTERNAL MEDICINE

## 2024-11-12 PROCEDURE — 1159F MED LIST DOCD IN RCRD: CPT | Performed by: INTERNAL MEDICINE

## 2024-11-12 PROCEDURE — 1126F AMNT PAIN NOTED NONE PRSNT: CPT | Performed by: INTERNAL MEDICINE

## 2024-11-12 ASSESSMENT — ENCOUNTER SYMPTOMS
COLOR CHANGE: 0
JOINT SWELLING: 0
TREMORS: 0
HEADACHES: 0
SINUS PRESSURE: 0
NAUSEA: 0
COUGH: 0
CONSTIPATION: 0
PALPITATIONS: 0
SORE THROAT: 0
RHINORRHEA: 0
WOUND: 0
MYALGIAS: 0
ARTHRALGIAS: 0
ABDOMINAL PAIN: 0
FLANK PAIN: 0
TROUBLE SWALLOWING: 0
NUMBNESS: 0
DYSURIA: 0
SHORTNESS OF BREATH: 1
WEAKNESS: 0
DIZZINESS: 0
CHOKING: 0
ADENOPATHY: 0
CHILLS: 0
ABDOMINAL DISTENTION: 0
APPETITE CHANGE: 0
DIFFICULTY URINATING: 0
FATIGUE: 0
STRIDOR: 0
ANAL BLEEDING: 0
SEIZURES: 0
DIAPHORESIS: 0
VOMITING: 0
NECK PAIN: 0
VOICE CHANGE: 0
EYE PAIN: 0
DIARRHEA: 0
RECTAL PAIN: 0
BACK PAIN: 0
CHEST TIGHTNESS: 0
BLOOD IN STOOL: 0
SLEEP DISTURBANCE: 0
WHEEZING: 0
PHOTOPHOBIA: 0
ACTIVITY CHANGE: 0
EYE REDNESS: 0
EYE DISCHARGE: 0
FACIAL SWELLING: 0
NECK STIFFNESS: 0
LIGHT-HEADEDNESS: 0
FACIAL ASYMMETRY: 0
SPEECH DIFFICULTY: 0
EYE ITCHING: 0
BRUISES/BLEEDS EASILY: 1
POLYPHAGIA: 0
HEMATURIA: 0
FREQUENCY: 0
SINUS PAIN: 0
POLYDIPSIA: 0

## 2024-11-12 ASSESSMENT — PAIN SCALES - GENERAL: PAINLEVEL_OUTOF10: 0-NO PAIN

## 2024-11-12 NOTE — PROGRESS NOTES
Subjective   Patient ID: Kerwin Stark is a 84 y.o. male [facility]    Patient presents for follow-up.  He was treated with Augmentin 2 weeks ago for pharyngitis.  He reports total resolution of the symptoms.  He denies any fever or chills.  He otherwise feels okay.  He denies any headaches, no dizziness, no chest pain or shortness of breath.  He denies abdominal pain no nausea vomiting or diarrhea.  He reports baseline arthritis symptoms.  He continues to complain of easy bruising.         Review of Systems   Constitutional:  Negative for activity change, appetite change, chills, diaphoresis and fatigue.   HENT:  Negative for congestion, dental problem, drooling, ear discharge, ear pain, facial swelling, hearing loss, mouth sores, nosebleeds, postnasal drip, rhinorrhea, sinus pressure, sinus pain, sneezing, sore throat, tinnitus, trouble swallowing and voice change.    Eyes:  Negative for photophobia, pain, discharge, redness, itching and visual disturbance.   Respiratory:  Positive for shortness of breath. Negative for cough, choking, chest tightness, wheezing and stridor.    Cardiovascular:  Negative for chest pain, palpitations and leg swelling.   Gastrointestinal:  Negative for abdominal distention, abdominal pain, anal bleeding, blood in stool, constipation, diarrhea, nausea, rectal pain and vomiting.   Endocrine: Negative for cold intolerance, heat intolerance, polydipsia, polyphagia and polyuria.   Genitourinary:  Negative for decreased urine volume, difficulty urinating, dysuria, enuresis, flank pain, frequency, genital sores, hematuria and urgency.   Musculoskeletal:  Negative for arthralgias, back pain, gait problem, joint swelling, myalgias, neck pain and neck stiffness.   Skin:  Negative for color change, pallor, rash and wound.   Neurological:  Negative for dizziness, tremors, seizures, syncope, facial asymmetry, speech difficulty, weakness, light-headedness, numbness and headaches.    Hematological:  Negative for adenopathy. Bruises/bleeds easily.   Psychiatric/Behavioral:  Negative for sleep disturbance.        Objective   /76   Pulse 68   Wt 73.5 kg (162 lb)   BMI 22.59 kg/m²     Physical Exam  Constitutional:       Appearance: Normal appearance.   HENT:      Mouth/Throat:      Mouth: Mucous membranes are moist.      Pharynx: Oropharynx is clear.   Cardiovascular:      Rate and Rhythm: Normal rate and regular rhythm.      Heart sounds: No murmur heard.     No gallop.   Pulmonary:      Effort: No respiratory distress.      Breath sounds: No wheezing or rales.   Abdominal:      General: There is no distension.      Palpations: There is no mass.      Tenderness: There is no abdominal tenderness. There is no guarding.   Musculoskeletal:      Cervical back: Normal range of motion and neck supple.      Right lower leg: No edema.      Left lower leg: No edema.   Neurological:      Mental Status: He is alert.         Assessment/Plan   Diagnoses and all orders for this visit:  Pharyngitis, unspecified etiology-symptoms have resolved.  Cardiac [id number] with cardiology  Mixed hyperlipidemia-we will continue with statin  Longstanding persistent atrial fibrillation (Multi)-rate is controlled.  Will continue with anticoagulation  Abnormal bruising-he will schedule follow-up appointment [facility] with hematology  Benign prostatic hyperplasia with urinary frequency-stable symptoms  Basal cell carcinoma of skin of other part of trunk  MGUS (monoclonal gammopathy of unknown significance)-schedule follow-up appointment with hematology treatment plan.  Health maintenance-immunizations are up-to-date.  Colonoscopy has been done.

## 2024-12-09 NOTE — PROGRESS NOTES
[facility]  [facility]  [address]  [facility] [id number] [phone]     [facility]  New Visit Note      Subjective   Patient ID: Kerwin Stark is a 84 y.o. male with past medical history significant for A-Fib, VT,  and OBSTRUCTIVE SLEEP APNEA .      12/16/2024: The patient is here accompanied by wife who adds to history and was referred by Pulmonary Disease  Heather Machado MD for comprehensive sleep medicine evaluation due to sleep apnea recently diagnosed. Today, the patient returned to the clinic to review his sleep study to treat his sleep apnea. The desensitization strategy, 30-day free mask return policy, and insurance requirements are all discussed with the patient. The patient verbalized understanding it. His ESS is  today. His ESS: 4, DEBBIE: 6  today.    HPI  Patient had been having these symptoms for the past couples of months. Patient had sleep study done in the past but no available records.       SLEEP STUDY HISTORY: (personally reviewed raw data such as interpretation report, data sheet, hypnogram, and titration table if available and applicable)  10/9/24: Home Sleep Study:  AHI 3%: 49/hr, Supine AHI 3%: 52/hr, AHI 4%: 47.9/hr, Supine AHI 4%: 51/hr, Giovani: 76.1%, consider 5-20 CWP     SLEEP-WAKE SCHEDULE  Bedtime: 9 PM on weekdays, same on weekends  Subjective sleep latency: 10-25 minutes  Problems falling asleep: No  Number of awakenings: 3-4 times per night spontaneously for bleeding mucous   Falls back asleep in 5 minutes  Problems staying asleep: Yes  Final wake time: 6 AM  on weekdays, same on weekends  Shift work: N0  Naps: Yes, 30-45 minutes  Feels rested after a nap: Yes   Average sleep duration (excluding naps): 8-9 hours    SLEEP ENVIRONMENT  Sleep location: bed  Sleep status: sleeps with wife  Room is dark:  Yes  Room is quiet: Yes  Room is cool: Yes  Bed comfort:  good    SLEEP HABITS:   Activities before bedtime: watch TV  Activities in bed: no  Preferred sleep position: back, and side    SLEEP ROS:  Night symptoms: POSITIVE for snoring, witnessed apnea, mouth breathing, and nocturnal cough  Morning symptoms: POSITIVE for unrefreshing sleep and morning dry mouth  Daytime symptoms POSITIVE for fatigue  Hypersomnia / narcolepsy symptoms: Patient denies symptoms of a hypersomnolence disorder such as sleep paralysis, sleep-related hallucinations, and cataplexy.   RLS symptoms: Patient denies RLS symptoms.  Movements in sleep: Patient denies problematic movements in sleep such as seizures during sleep, frequent leg kicks / jerks while asleep, sleep-related bruxism, and waking up with bedsheets in disarray.  Parasomnia symptoms: Denies    WEIGHT: stable    REVIEW OF SYSTEMS: All other systems have been reviewed and are negative.    PERTINENT SOCIAL HISTORY:  Occupation: retired  Smoking: No   ETOH: No   Marijuana: No   Caffeine: Yes, 2 cups of coffee in the morning, and afternoon.  Sleep aids: No   Claustrophobia: No     PERTINENT PAST SURGICAL HISTORY:  non-contributory    PERTINENT FAMILY HISTORY:  Patient denies family history of any sleep disorder.  Patient denies family history of sleep apnea.    Active Problems, Allergy List, Medication List, and PMH/PSH/FH/Social Hx have been reviewed and reconciled in chart. No significant changes unless documented in the pertinent chart section. Updates made when necessary.       Objective   Vitals:    12/16/24 1447   BP: 110/75   Pulse: 60   Temp: 36.3 °C (97.4 °F)   SpO2: 94%      REVIEW OF SYSTEMS  All other systems have been reviewed and are negative.    ALLERGIES  No Known Allergies    MEDICATIONS  Current Outpatient Medications   Medication Sig Dispense Refill    albuterol (ProAir HFA) 90 mcg/actuation inhaler Inhale 2 puffs every 4 hours if needed for wheezing or shortness of breath. 8.5 g 5    beta carotene (vitamin A) 3,000 mcg  (10,000 unit) capsule Take 1 capsule (10,000 Units) by mouth every 3rd day.      empagliflozin (Jardiance) 10 mg Take 1 tablet (10 mg) by mouth once daily.      spironolactone (Aldactone) 25 mg tablet 1 tablet (25 mg).      tafamidis (Vyndamax) 61 mg capsule Take 1 capsule (61 mg) by mouth once daily. 30 capsule 11    torsemide (Demadex) 10 mg tablet Take 1 tablet (10 mg) by mouth once daily.      Xarelto 20 mg tablet TAKE 1 TABLET BY MOUTH DAILY 100 tablet 2     No current facility-administered medications for this visit.         Physical Exam  Constitutional:alert and oriented to time, place, and person  Sinus: - tenderness to palpation  Palate: Narrow Mallampati 2, + Tongue scalloping, + macroglossia  Lungs: Clear to auscultation bilateral, no rales  Heart: Irregular rate and rhythm, no murmurs    Assessment/Plan   Kerwin Stark is a 84 y.o. male who is seen to evaluate for severe obstructive sleep apnea. The pathophysiology of sleep apnea, diagnostic testing (HST vs PSG), treatment options (PAP, oral appliance, surgery, hypoglossal nerve stimulator called Inspire), and supportive management (weight loss, positional therapy, smoking cessation, avoidance of alcohol and sedatives) were discussed with the patient in detail. Risk factors of sleep apnea as well as cardiometabolic and neurocognitive sequelae associated with untreated sleep apnea were also discussed. Lastly, patient was advised to avoid driving vehicle or operating heavy machinery when sleepy.     Kerwin Stark with the following problems:     # OBSTRUCTIVE SLEEP APNEA : SEVERE  -Start 5-15  cmH20 auto CPAP with mask fitting through [employer]. (Expedite it)  -Sleep apnea and PAP therapy education were provided at length in the clinic today. Kerwin Stark verbalized understanding.  -Emphasized diet, exercise, and weight loss in the clinic, as were non-supine sleep, avoiding alcohol in the late evening, and driving or operating heavy  machinery when sleepy.  -Kerwin Mathiasbalizes understanding of the above instructions and risks.    # CHRONIC SLEEP ONSET/ SLEEP MAINTENANCE INSOMNIA:  -likely due to poor sleep hygiene, and untreated sleep apnea.  -DEBBIE: 9  -Sleep hygiene discuss in the clinic.    # HYPERTENSION/ ATRIAL FIBRILLATION:  -Blood pressure was controlled today. To control the blood pressure better, instruct the patient to take anti-hypertension medication at bedtime and a water pill in the waking time.  -Denies headache, palpitation, and syncope in the clinic.  -Follows with PCP/ Cardiology     # NASAL CONGESTION:  -Instruct Kerwin Lunsford use appropriate Flonase spray to ease congestion.    # XEROSTOMIA:  -Instruct Kerwin Lunsford purchase the Biotene gel to ease the dry mouth symptom,     RTC 1 month after receiving CPAP       All of patient's questions were answered. He verbalizes understanding and agreement with my assessment and plan.

## 2024-12-16 ENCOUNTER — OFFICE VISIT (OUTPATIENT)
Dept: SLEEP MEDICINE | Facility: HOSPITAL | Age: 84
End: 2024-12-16
Payer: MEDICARE

## 2024-12-16 VITALS
HEART RATE: 60 BPM | TEMPERATURE: 97.4 F | SYSTOLIC BLOOD PRESSURE: 110 MMHG | DIASTOLIC BLOOD PRESSURE: 75 MMHG | OXYGEN SATURATION: 94 % | WEIGHT: 167 LBS | BODY MASS INDEX: 23.29 KG/M2

## 2024-12-16 DIAGNOSIS — I43 CARDIAC AMYLOIDOSIS: ICD-10-CM

## 2024-12-16 DIAGNOSIS — I48.91 ATRIAL FIBRILLATION, UNSPECIFIED TYPE (MULTI): ICD-10-CM

## 2024-12-16 DIAGNOSIS — G47.33 OBSTRUCTIVE SLEEP APNEA: Primary | ICD-10-CM

## 2024-12-16 DIAGNOSIS — E85.4 CARDIAC AMYLOIDOSIS: ICD-10-CM

## 2024-12-16 DIAGNOSIS — I47.29 VENTRICULAR TACHYCARDIA, NON-SUSTAINED (MULTI): ICD-10-CM

## 2024-12-16 DIAGNOSIS — G47.33 OBSTRUCTIVE SLEEP APNEA (ADULT) (PEDIATRIC): ICD-10-CM

## 2024-12-16 PROCEDURE — 1126F AMNT PAIN NOTED NONE PRSNT: CPT

## 2024-12-16 PROCEDURE — 1036F TOBACCO NON-USER: CPT

## 2024-12-16 PROCEDURE — 99214 OFFICE O/P EST MOD 30 MIN: CPT

## 2024-12-16 PROCEDURE — 1159F MED LIST DOCD IN RCRD: CPT

## 2024-12-16 SDOH — ECONOMIC STABILITY: FOOD INSECURITY: WITHIN THE PAST 12 MONTHS, YOU WORRIED THAT YOUR FOOD WOULD RUN OUT BEFORE YOU GOT MONEY TO BUY MORE.: NEVER TRUE

## 2024-12-16 SDOH — ECONOMIC STABILITY: FOOD INSECURITY: WITHIN THE PAST 12 MONTHS, THE FOOD YOU BOUGHT JUST DIDN'T LAST AND YOU DIDN'T HAVE MONEY TO GET MORE.: NEVER TRUE

## 2024-12-16 ASSESSMENT — SLEEP AND FATIGUE QUESTIONNAIRES
HOW LIKELY ARE YOU TO NOD OFF OR FALL ASLEEP WHILE SITTING AND READING: SLIGHT CHANCE OF DOZING
HOW LIKELY ARE YOU TO NOD OFF OR FALL ASLEEP WHILE WATCHING TV: SLIGHT CHANCE OF DOZING
HOW LIKELY ARE YOU TO NOD OFF OR FALL ASLEEP IN A CAR, WHILE STOPPED FOR A FEW MINUTES IN TRAFFIC: WOULD NEVER DOZE
SLEEP_PROBLEM_INTERFERES_DAILY_ACTIVITIES: NOT AT ALL NOTICEABLE
HOW LIKELY ARE YOU TO NOD OFF OR FALL ASLEEP WHEN YOU ARE A PASSENGER IN A CAR FOR AN HOUR WITHOUT A BREAK: WOULD NEVER DOZE
ESS-CHAD TOTAL SCORE: 4
DIFFICULTY_FALLING_ASLEEP: MILD
DIFFICULTY_STAYING_ASLEEP: MILD
HOW LIKELY ARE YOU TO NOD OFF OR FALL ASLEEP WHILE SITTING AND TALKING TO SOMEONE: WOULD NEVER DOZE
SLEEP_PROBLEM_NOTICEABLE_TO_OTHERS: NOT AT ALL NOTICEABLE
SITING INACTIVE IN A PUBLIC PLACE LIKE A CLASS ROOM OR A MOVIE THEATER: WOULD NEVER DOZE
HOW LIKELY ARE YOU TO NOD OFF OR FALL ASLEEP WHILE SITTING QUIETLY AFTER LUNCH WITHOUT ALCOHOL: WOULD NEVER DOZE
SATISFACTION_WITH_CURRENT_SLEEP_PATTERN: SATISFIED
HOW LIKELY ARE YOU TO NOD OFF OR FALL ASLEEP WHILE LYING DOWN TO REST IN THE AFTERNOON WHEN CIRCUMSTANCES PERMIT: MODERATE CHANCE OF DOZING
WAKING_TOO_EARLY: MILD
WORRIED_DISTRESSED_DUE_TO_SLEEP: A LITTLE

## 2024-12-16 ASSESSMENT — LIFESTYLE VARIABLES
HOW OFTEN DO YOU HAVE SIX OR MORE DRINKS ON ONE OCCASION: NEVER
HOW OFTEN DO YOU HAVE A DRINK CONTAINING ALCOHOL: NEVER
SKIP TO QUESTIONS 9-10: 1
AUDIT-C TOTAL SCORE: 0
HOW MANY STANDARD DRINKS CONTAINING ALCOHOL DO YOU HAVE ON A TYPICAL DAY: PATIENT DOES NOT DRINK

## 2024-12-16 ASSESSMENT — PAIN SCALES - GENERAL: PAINLEVEL_OUTOF10: 0-NO PAIN

## 2024-12-16 ASSESSMENT — PATIENT HEALTH QUESTIONNAIRE - PHQ9
SUM OF ALL RESPONSES TO PHQ9 QUESTIONS 1 & 2: 0
2. FEELING DOWN, DEPRESSED OR HOPELESS: NOT AT ALL
1. LITTLE INTEREST OR PLEASURE IN DOING THINGS: NOT AT ALL

## 2024-12-16 NOTE — PATIENT INSTRUCTIONS
[facility] Sleep Medicine  [facility]  [address]  [facility] [id number] [phone]       Thank you for coming to the Sleep Medicine Clinic today! Your sleep medicine provider today was: EVE Ascencio Below is a summary of your treatment plan, patient education, other important information, and our contact numbers.    Dear Mr. Kerwin Stark       Your Sleep Provider Today: EVE Ascencio  Your Primary Care Physician: Lebron Noland MD   Your Referring Provider: Heather Machado MD    Diagnosis: OBSTRUCTIVE SLEEP APNEA       Thank you for visiting  Sleep Medicine Clinic !     1. According to your symptom and sleep study report. I will order the new PAP device for you to control your sleep apnea, feel free to contact your DME.   If [employer] is your DME, you can reach them at [phone].   2. Please do not drive when you are sleepy and  start practicing the sleep hygiene as discussed in clinic.    3. Please start practicing the appropriate nasal spray at night to ease your nasal congestion as discussed in clinic today, and using Biotene to ease your dry mouth if needed.    4. FOR QUESTIONS AND CONCERNS:   a) : In case of problems with machine or mask interface, please contact your DME company first. DME is the company who provides you the machine and/or PAP supplies / accessories. If [employer] is your DME, you can reach them at [phone].   b):  Please call my office with issues or questions: [phone] ([place]); [phone] ([facility]); [phone] ([place])    If you have a CPAP or BiPAP machine at home, please bring the unit and all accessories including the power cord to your appointments unless I tell you otherwise. Please have knowledge of the DME company you worked with to receive your PAP device. If you have copies of any previous sleep testing completed outside of , please  "bring with you to clinic as well. This information will make our visits more productive.     If you are new to CPAP or BiPAP, please note the minimum usage insurance requires to continuing coverage for the equipment as noted by your DME company. Please discuss equipment issues (PAP unit, mask fit, humidification, etc.) with your DME company first.       In the event that you are running more than 10 minutes late to your appointment, I will kindly ask you to reschedule. Thanks.      TREATMENT PLAN     - Start auto-CPAP. Order placed and sent to [employer].  - Please read the \"Patient Education\" section below for more detailed information. Try implementing tips, reminders, strategies, and supportive management.   - If not yet done, please sign up for [employer] to make a future schedule, send prescription requests, or send messages.    Follow-up Appointment:   Follow-up in 31 days after getting new machine.    PATIENT EDUCATION     OBSTRUCTIVE SLEEP APNEA (KEILA) is a sleep disorder where your upper airway muscles relax during sleep and the airway intermittently and repetitively narrows and collapses leading to partially blocked airway (hypopnea) or completely blocked airway (apnea) which, in turn, can disrupt breathing in sleep, lower oxygen levels while you sleep and cause night time wakings. Because both apnea and hypopnea may cause higher carbon dioxide or low oxygen levels, untreated KEILA can lead to heart arrhythmia, elevation of blood pressure, and make it harder for the body to consolidate memory and facilitate metabolism (leading to higher blood sugars at night). Frequent partial arousals occur during sleep resulting in sleep deprivation and daytime sleepiness. KEILA is associated with an increased risk of cardiovascular disease, stroke, hypertension, and insulin resistance. Moreover, untreated KEILA with excessive daytime sleepiness can increase the risk of motor vehicular accidents.    Below are " conservative strategies for KEILA regardless of KEILA severity are:   Positional therapy - Avoid sleeping on your back.   Healthy diet and regular exercise to optimize weight is highly encouraged.   Avoid alcohol late in the evening and sedative-hypnotics as these substances can make sleep apnea worse.   Improve breathing through the nose with intranasal steroid spray, saline rinse, or antihistamines    Safety: Avoid driving vehicle and operating heavy equipment while sleepy. Drowsy driving may lead to life-threatening motor vehicle accidents. A person driving while sleepy is 5 times more likely to have an accident. If you feel sleepy, pull over and take a short power nap (sleep for less than 30 minutes). Otherwise, ask somebody to drive you.    Treatment options for sleep apnea include weight management, positional therapy, Positive Airway Therapy (PAP) therapy, oral appliance therapy, hypoglossal nerve stimulator (Inspire) and select airway surgeries.    Starting Positive Airway Pressure (PAP): You were ordered a device to wear when you sleep called PAP (Positive Airway Pressure) to treat your sleep apnea. The order will be submitted to a durable medical equipment (DME) company who will arrange setting you up with the device. They will provide all the necessary equipment and discuss use and maintenance of the device with you as well as mask fitting and process of replacing / renewing PAP supplies or accessories. Once you get the machine, please start using it immediately. You may not be successful right away and that is okay. [place] be certain that you keep trying nightly and reach out to DME if you are struggling or having issues with machine usage.     *Please follow-up with me in 1-2 months of starting CPAP to see how well it is working for you and to do some troubleshooting if needed. Also, please bring all PAP equipment with you to follow up appointments unless told otherwise.     Important things to keep in mind  as you start PAP:  Insurance will monitor your usage during the first 90 days. You should use your PAP - all night, every night, and including all naps (especially if naps are more than 30 minutes) for your health. The bare minimum is to use your PAP device while sleeping for at least 4 hours per day at least 5-6 days per week.. Otherwise, your PAP device will be reclaimed by your DME company at 90 days.  There are many masks to choose from to wear with your PAP machine. If you are not comfortable with the first mask issued to you, call your DME company and ask for another option to try. You typically have a 30-day mask guarantee from the day you received your machine.   Discuss with your provider if you are having issues breathing with the machine or if the temperature or humidity feel uncomfortable.  Expect to have an adjustment period when you start your device. It helps to continuing wearing the machine every day for a period of time until you get more used to it. You can practice with wearing the mask alone if you need, then add in the PAP air pressure a few days later.   Reach out for help if you are struggling! The sleep medicine department can be reached at [phone]  We encourage you to download data monitoring apps to your phone. For Run[employer] 10/11 - MyAir yisel. For [employer]Art - DreamMapper. Both apps are available in the [employer] store for free and are a great tool to monitor your progress with your PAP device night to night.    Tips for success with PAP machine usage:  Comfortable and well-fitting mask  Appropriate pressure on the machine  Using humidification  Support from bed partner and clinical team      Maintaining your CPAP/BPAP device:    The humidification chamber (aka water tank or water chamber) needs to be filled with distilled water to prevent buildup of white deposits in the future. If you cannot find distilled water, you can use tap water but expect to have white  deposits buildup seen after prolonged use with tap water. If you start seeing white deposits on the water chamber, you can clean it by filling it with equal parts of distilled white vinegar and water. Let the vinegar-water mixture sit for 2 hours, and then rinse it with running tap water. Clean with soap and water then let it dry.     You should try to keep your machine clean in order to work well. Here are some tips to clean PAP supplies / accessories:    Clean the humidification chamber (aka water tank) as well as your mask and tubing at least once a week with soap and water.   Alternatively, you can fill a sink or basin with warm water and add a little mild detergent, like Ivory dish soap. Gently wipe your supplies with the soapy water to free all the oils and dirt that may have collected. Once that's done, rinse these items with clean water until the soap is gone and let them air dry. You can hang your tubing over the curtain tigre in your bathroom so that it dries.  The mask insert (part of the mask that has contact with your skin) needs to be cleaned with soap and water daily. Another option is to wipe them down with CPAP wipes or baby wipes.    You should replace your mask and tubing frequently in order to prevent bacteria buildup, machine damage, and mask seal issues. The older the mask and hose, the high likelihood that there is bacteria buildup in it especially if they are not cleaned regularly. Dirty filters damage machines because build-up of dust and contaminants can cause machine to over-heat, and in time, damage the motor of machine. Cushions lose their seal over time as most masks are made of plastic and silicone while headgear is made of neoprene. These materials will break down with age and frequent use. Here is the recommended replacement schedule for PAP supplies / accessories:    Twice a month- disposable filters and cushions for nasal mask or nasal pillows.  Once a month- cushion for full face  mask  Every 3 months- mask with headgear and PAP tubing (standard or heated hose)  Every 6 months- reusable filter, water chamber, and chin strap     Other useful information:    Some people do not put water in the tank while other people prefers to put water in the tank to prevent mouth dryness. Try to experiment to determine which is more comfortable for you.   In general, new machines have 2 years warranty on parts while health insurance allows you to have a new machine once every 5 years.     Common issues with PAP machine:    Mask gets dislodged when turning to the side: Consider getting a CPAP pillow or switching to a mask with hose on top.     Dry mouth:  Your machine has built-in humidifier that heats up the air to prevent dry mouth. It can be adjusted to your comfort. You can try that first and increase setting one level one night at a time to check which setting is comfortable and effective in lessening dry mouth. In some patients with heated hose, adjusting tube temperature to make air warmer can improve dry mouth. If dry mouth persists despite adjusting humidity or tube temperature setting, may apply OTC Biotene gel over the gums at bedtime.  If Biotene gel is not effective, consider trying XEROSTOM gel from [employer].  Also, eliminate or reduce dose of medications that can cause dry mouth if possible. Lastly, may try getting a separate room humidifier machine.    Airleaks: Please call DME as they may need to adjust your mask or refit you with a different kind or different size of mask. In addition, you can ask DME for tips on getting a good mask seal and mask fit.     Difficulty tolerating the mask: Contact your DME to try a different kind of mask and/or call office to get a referral to Sleep Psychologist for CPAP desensitization. CPAP desensitization technique is a set of strategies that helps patient cope with claustrophobia and anxiety related to wearing mask. Alternatively, we can do a daytime  "mini-sleep study called PAP-nap trial wherein you will try on different kinds of mask and the sleep technician will try different pressure settings on CPAP and BPAP machines to see which specific pressure is tolerable and comfortable for you.     Water droplets or moisture within the hose and/or mask: This is called rain-out and it is caused by condensation of too much heated humidity on the cooler walls of the hose. If you have rain-out, turn down humidity settings or get a heated hose. If you already have a heated hose, turn up the \"tube temperature\" of the heated hose. Alternatively, if you don't want to get a heated hose or warmer air, may wrap the CPAP hose with stockings to keep it somewhat warm. Also, you need to place the machine on the floor and lower the hose so that water won't travel upward towards your mask.     PAP desensitization techniques: If you have concerns about something being on your face at night, you can start by getting used to it before trying to sleep with it as follows:      Sit in a comfortable chair or bed. Connect the mask and hose to the CPAP/BPAP machine. Hold the mask on your face (without straps on) and turn on the machine. Practice breathing with the mask on while awake sitting and watching television, reading, or performing a sedentary activity during the day for 5-10 minutes and then take it off.  If tolerated, try again and gradually build up to longer periods of time. If not tolerated, try and try again until it is more comfortable as you become more desensitized. If you are able to use it for at least 20-30 minutes, move unto the next step.     Sit in a comfortable chair or bed. Connect the mask and hose to the CPAP/BPAP machine. Strap the mask on your head and turn on the machine. Practice breathing with the mask and headgear on while awake sitting and watching television, reading, or performing a sedentary activity. Start with 5-10 minutes and gradually increasing time " until you can wear it comfortably for at least 20-30 minutes, then move to the next step.    Take a shorter daytime nap with machine turned on while you are in a reclined position in bed, sofa, or recliner. Start with 5-10 minute nap and gradually increase up to 30 minutes. It is not important whether you fall asleep or not. The goal is to rest comfortably with PAP machine on.     Reintroduce PAP machine into nighttime sleep. You can begin using it a portion of the night and gradually increase up to entire night.     Proceed from one step to the next only when you are completely comfortable. If you feel any anxiety or discomfort, return to the previous step, then proceed again when comfortable.    Expect to “work” with your CPAP/BIPAP unit. It is important to try to relax when beginning CPAP/BIPAP therapy. Inhalation and exhalation should occur through the nose only. If you are unable to consistently breathe this way, do not panic or lose hope. There are other types of masks which allow you to breathe through your nose and/or your mouth. Also, in some patients, using intranasal steroid spray (e.g. Flonase or Nasocort or Fluticasone) 1 hour before bedtime and/or before putting on CPAP mask can help tolerate breathing through the mask.    Don't give up after a few attempts--some patients adjust quickly, while some patients need 3-4 weeks (or sometimes even longer) to be accustomed to CPAP therapy.  Contact your sleep medicine specialist if you have a significant change in weight since this may affect your pressure.    How to use nasal sprays properly: If you have nasal congestion or allergies, improving your breathing through the nose is critical for treating sleep apnea and improving your sleep. Hence, intranasal steroid spray like Flonase or Nasocort (generic name is Fluticasone) might help. In some patients with sleep apnea, using intranasal steroid spray allowed them to tolerate CPAP mask better.     Intranasal  steroids are usually prescribed as 2 sprays per nostril 1 hour before bedtime. If you administer intranasal steroids too close to bedtime, it might not work as well.  If you have nasal congestion or allergies during day despite using Flonase at night, try adding Azelastine nasal spray 2 sprays per nostril in the morning. Alternatively, can consider adding over-the-counter non-sedating antihistamine medications.     However, if you have severe nasal congestion or allergies despite using both nasal sprays, consider seeing an allergy specialist to confirm which substance you are sensitive to and get definitive treatment which may be in the form of injections, oral pills, different kind of nose sprays, and/or a combination of everything said.     Below are instructions on how to use intranasal steroid spray properly:  Sit up or stand up with your face down.  Shake the spray bottle and insert its tip in one nostril.  Point the spray tip towards your ear, and not towards the middle of your nose. Pointing the tip upward and in the middle of the nose can lead to discomfort and irritation of nasal mucosa which can lead to nose bleeding or coughing up tinges of blood.  Squeeze the spray bottle and administer the recommended amount of spray.   Don't sniff when you spray. Instead, remove the spray bottle and pinch your nose for 10 seconds.   Perform steps 1-6 on the other nostril.    You can also go to the following EDUCATION WEBSITES for further information:   American Academy of Sleep Medicine http://sleepeducation.org  National Sleep Foundation: https://sleepfoundation.org  American Sleep Apnea Association: https://www.sleepapnea.org (for patients with sleep apnea)  Narcolepsy Network: https://www.narcolepsynetwork.org (for patients with narcolepsy)  [employer]: https://www.wakeupnarcolepsy.org (for patients with narcolepsy)  Hypersomnia Foundation: https://www.hypersomniafoundation.org (for patients with  idiopathic hypersomnia)  RLS foundation: https://www.rls.org (for patients with restless leg syndrome)    IMPORTANT INFORMATION     Call 911 for medical emergencies.  Our offices are generally open from Monday-Friday, 8 am - 5 pm.   There are no supporting services by either the sleep doctors or their staff on weekends and Holidays, or after 5 PM on weekdays.   If you need to get in touch with me, you may either call my office number or you can use [employer].  If a referral for a test, for CPAP, or for another specialist was made, and you have not heard about scheduling this within a week, please call scheduling at [phone] ([phone]).  If you are unable to make your appointment for clinic or an overnight study, kindly call the office or sleep testing center at least 48 hours in advance to cancel and reschedule.  If you are on CPAP, please bring your device's card and/or the device to each clinic appointment.   In case of problems with PAP machine or mask interface, please contact your [employer] (Durable Medical Equipment) company first. [employer] is the company who provides you the machine and/or PAP supplies.       PRESCRIPTIONS     We require 7 days advanced notice for prescription refills. If we do not receive the request in this time, we cannot guarantee that your medication will be refilled in time.    IMPORTANT PHONE NUMBERS     Sleep Medicine Clinic Fax: [phone]  Appointments (for Pediatric Sleep Clinic): [phone] - option 1  Appointments (for Adult Sleep Clinic): [phone] - option 2  Appointments (For Sleep Studies): [phone] - option 3  Behavioral Sleep Medicine: [phone]  Sleep Surgery: [phone]  Nutrition Service: [phone]  Weight management clinics with endocrinology: [phone]  Bariatric Services: [phone] (includes weight loss medications and weight loss surgery)  [facility]: [phone] (offers holistic approaches to weight management)  ENT  (Otolaryngology): [phone]  Headache Clinic (Neurology): [phone]  Neurology: [phone]  Psychiatry: [phone]  Pulmonary Function Testing (PFT) Center: [phone]  Pulmonary Medicine: [phone]  [facility] [employer] ([place]): [phone]      OUR SLEEP TESTING LOCATIONS     Our team will contact you to schedule your sleep study, however, you can contact us as follow:  Main Phone Line (scheduling only): [phone] ([phone]), option 3  Adult and Pediatric Locations  [facility] (6 years and older): [facility] - 4th floor ([address]) After hours line: [phone]  [facility] (Main campus: All ages): [facility], 6th floor. After hours line: [phone]   [place] (5 years and older; younger considered on case-by-case basis): [address]; [address], [address]. Scheduling  After hours line: [phone]   [place] (6 years and older): [address]; [address]; [address]   [place] (6 years and older): [address], [address]  After hours line: [phone]   [place] (13 years and older) in [place]: [address], 2nd floor  After hours line: [phone]  [facility] (13 year and older): [address]  After hours line: [phone]     Adult Only Locations:   [place] (18 years and older): [address] [facility], 2nd floor   [place] (18 years and older): [address] [facility] [facility]; 4th floor  After hours line: [phone]  [facility] (18 years and older) at [place]: [id number] [place] [address]  After hours line: [phone]     CONTACTING YOUR SLEEP MEDICINE PROVIDER AND SLEEP TEAM      For issues with your machine or mask interface, please call your DME provider first. DME stands for durable medical company. DME is the company who provides you the machine and/or PAP supplies / accessories.   To schedule, cancel, or reschedule SLEEP STUDY APPOINTMENTS,  "please call the Main Phone Line at [phone] - option 3.   To schedule, cancel, or reschedule CLINIC APPOINTMENTS, you can do it in \"MyChart\", call [phone] to speak with my  ([facility]), or call the Main Phone Line at [phone]) - option 2  For CLINICAL QUESTIONS or MEDICATION REFILLS, please call direct line for Adult Sleep Nurses at [phone].   Lastly, you can also send a message directly to your provider through \"My Chart\", which is the email service through your  Records Account: [email or web address]       Here at [facility], we wish you a restful sleep!   "

## 2025-01-09 ENCOUNTER — OFFICE VISIT (OUTPATIENT)
Dept: PULMONOLOGY | Facility: CLINIC | Age: 85
End: 2025-01-09
Payer: MEDICARE

## 2025-01-09 VITALS
HEART RATE: 61 BPM | OXYGEN SATURATION: 94 % | DIASTOLIC BLOOD PRESSURE: 82 MMHG | TEMPERATURE: 97.7 F | WEIGHT: 165 LBS | SYSTOLIC BLOOD PRESSURE: 130 MMHG | BODY MASS INDEX: 23.01 KG/M2

## 2025-01-09 DIAGNOSIS — R93.89 ABNORMAL CHEST CT: Primary | ICD-10-CM

## 2025-01-09 DIAGNOSIS — J92.9 PLEURAL PLAQUE: ICD-10-CM

## 2025-01-09 DIAGNOSIS — G47.33 OSA (OBSTRUCTIVE SLEEP APNEA): ICD-10-CM

## 2025-01-09 DIAGNOSIS — R05.9 COUGH, UNSPECIFIED TYPE: ICD-10-CM

## 2025-01-09 DIAGNOSIS — R04.2 HEMOPTYSIS: ICD-10-CM

## 2025-01-09 DIAGNOSIS — E85.82 WILD-TYPE TRANSTHYRETIN-RELATED (ATTR) AMYLOIDOSIS (MULTI): ICD-10-CM

## 2025-01-09 PROCEDURE — 1159F MED LIST DOCD IN RCRD: CPT | Performed by: STUDENT IN AN ORGANIZED HEALTH CARE EDUCATION/TRAINING PROGRAM

## 2025-01-09 PROCEDURE — 1126F AMNT PAIN NOTED NONE PRSNT: CPT | Performed by: STUDENT IN AN ORGANIZED HEALTH CARE EDUCATION/TRAINING PROGRAM

## 2025-01-09 PROCEDURE — 1036F TOBACCO NON-USER: CPT | Performed by: STUDENT IN AN ORGANIZED HEALTH CARE EDUCATION/TRAINING PROGRAM

## 2025-01-09 PROCEDURE — 99214 OFFICE O/P EST MOD 30 MIN: CPT | Performed by: STUDENT IN AN ORGANIZED HEALTH CARE EDUCATION/TRAINING PROGRAM

## 2025-01-09 RX ORDER — ALBUTEROL SULFATE 90 UG/1
4 INHALANT RESPIRATORY (INHALATION) ONCE
OUTPATIENT
Start: 2025-01-09

## 2025-01-09 RX ORDER — ALBUTEROL SULFATE 0.83 MG/ML
3 SOLUTION RESPIRATORY (INHALATION) ONCE
OUTPATIENT
Start: 2025-01-09 | End: 2025-01-09

## 2025-01-09 ASSESSMENT — ENCOUNTER SYMPTOMS
CONSTIPATION: 0
COLOR CHANGE: 0
ANAL BLEEDING: 0
VOMITING: 0
DIARRHEA: 0
ABDOMINAL DISTENTION: 0
NAUSEA: 0
RECTAL PAIN: 0
BLOOD IN STOOL: 0
UNEXPECTED WEIGHT CHANGE: 0
FEVER: 0
ABDOMINAL PAIN: 0
TROUBLE SWALLOWING: 0
CHILLS: 0
SHORTNESS OF BREATH: 1

## 2025-01-09 ASSESSMENT — PAIN SCALES - GENERAL: PAINLEVEL_OUTOF10: 0-NO PAIN

## 2025-01-09 NOTE — PROGRESS NOTES
Department of Medicine I Division of Pulmonary, Critical Care, and Sleep Medicine   [id number] [place] [address] [address] [place] [address]  Phone: [phone]  Fax: [phone]    History of Present Illness   Kerwin Stark is a 84 y.o. male presenting with abnormal CT chest findings.     1/09/2025   Since the last visit   Waiting for CPAP machine, had apptmt with sleep medicine  Has been following with cardiology--last echo report seen on care everywhere from 2023 showing EF of 50-55% ; planned for repeat echo soon and bnp, last bnp on file 1550 from [facility]   Was also treated for pharyngitis recently in November ( in [place])   Still having bloody sputum--this is happening in the morning only (after he wakes up from supine position), not a great amount but has been happening every morning since April.   Started using humidifier at home   MMRC1    Pulmonary medications: albuterol as needed   9/2024   Since the last visit   Saw ent who did NPL--no upper airway source of bleeding   Set up for bronch with BAL in [facility]---no obvious source of bleeding although there were some streaky bloody secretions in the airway,  he had a lymphoctic predominant cell count and cytology was normal and no fungal elements seen on silver stain.  Negative cultures thus far   Patient endorses night time hemoptysis (only), none during the day time   Also notes that his phlegm is slick  He does snore at night and increased fatigue in the morning   Will be seeing Dr. Smith Solares cardiology at [facility] for his cardiology care   Last echo visible in care everywhere from 2/2023--noted to have low normal LVEF with low normal RV EF with moderate MR and dilated IVC ;  mentions that he may have had an echo through the research protocol he is in recently       5/2024  Since the last visit   PFT showed no obstruction and mild hyperinflation   HRCT chest showed dependent interlobular septal thickening, mild reticulations and pleural plaques   No known  asbestos exposure   We sent request for walk test from [facility] but they could not find any record of a walk test there   He is is still having daily early morning hemoptysis--the size of a quarter--only happens first thing in the morning--once, does not happen later in the day or at any other time   Is on diuretics   Denies nosebleeds       2/2024  Referred by:  Dr. Noland  for abnormal CT chest findings. I have independently interviewed and examined the patient in the office and reviewed available records.    Here with his wife   Endorses months of having productive cough with grey phlegm and sometimes has a dark brown jelly like substance; this is the first time it happened to him  Last time he coughed up the dark brown substance was this morning --one speck in a glob of clear mucous   Was treated with antibiotics for a possible pneumonia; feels like it may have made improvement to the quality of the cough   Cough doesn't bother him at night,  mostly in the morning; denies PND or sinus drainage, no nose bleeds  Denies recurrent bronchitis   Denies any recent hospitalizations   He does get dyspnea with activity --mostly he has attributed it to his afib since it has been a longstanding problem for him   He was previously on tikosyn , he has never been on amiodarone     Does stay active--walk 5-6 miles a day, usually its tough when he first starts walking, but then eases up     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 Leticia Yuan at [facility] --is in a clinical trial(CARDIOTTRANSFORM-Eplontersen vs placebo); sees every 3 months ; did have a couple of 6mw as part of the protocol     Pulmonary medications: none   Review of Systems  Review of Systems   Constitutional:  Negative for chills, fever and unexpected weight change.   HENT:  Negative for trouble swallowing.    Respiratory:  Positive for shortness of breath.    Cardiovascular:  Negative for chest pain.   Gastrointestinal:  Negative for abdominal  distention, abdominal pain, anal bleeding, blood in stool, constipation, diarrhea, nausea, rectal pain and vomiting.   Skin:  Negative for color change.     All other review of systems are negative and/or non-contributory.    Past Medical History   He has a past medical history of A-fib (Multi), Cough, unspecified, Disorder of prostate, unspecified, Impacted cerumen, bilateral (04/07/2017), Personal history of other diseases of the musculoskeletal system and connective tissue, Personal history of other diseases of the nervous system and sense organs, Personal history of other diseases of the nervous system and sense organs (12/27/2013), Personal history of other endocrine, nutritional and metabolic disease (06/06/2018), and Syncope and collapse (10/09/2013).    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       Immunizations     Immunization History   Administered Date(s) Administered    Flu vaccine, trivalent, preservative free, HIGH-DOSE, age 65y+ (Fluzone) 10/26/2015, 09/28/2016, 10/10/2018, 10/16/2019, 10/17/2022    Influenza, Seasonal, Quadrivalent, Adjuvanted 10/15/2021, 10/16/2023    Pfizer COVID-19 vaccine, 12 years and older, (30mcg/0.3mL) (Comirnaty) 10/16/2023, 09/13/2024    Pfizer COVID-19 vaccine, bivalent, age 12 years and older (30 mcg/0.3 mL) 11/02/2022, 08/09/2023    Pfizer Gray Cap SARS-CoV-2 06/15/2022    Pfizer Purple Cap SARS-CoV-2 01/27/2021, 03/18/2021, 10/24/2021    Pneumococcal Conjugate PCV 7 1942    Pneumococcal conjugate vaccine, 13-valent (PREVNAR 13) 05/18/2016    Pneumococcal polysaccharide vaccine, 23-valent, age 2 years and older (PNEUMOVAX 23) 05/20/2010, 10/09/2010, 02/18/2021    Zoster vaccine, recombinant, adult (SHINGRIX) 04/10/2019, 05/17/2019    Zoster, live 10/09/2012, 11/07/2012       Medications and Allergies     Current Outpatient Medications   Medication Instructions    albuterol (ProAir HFA) 90 mcg/actuation inhaler 2 puffs,  inhalation, Every 4 hours PRN    beta carotene (VITAMIN A) 10,000 Units, Every 72 hours    empagliflozin (JARDIANCE) 10 mg, Daily RT    spironolactone (ALDACTONE) 25 mg    tafamidis (VYNDAMAX) 61 mg, oral, Daily    torsemide (DEMADEX) 10 mg, Daily    Xarelto 20 mg tablet TAKE 1 TABLET BY MOUTH DAILY      Patient has no known allergies.    Social History   He reports that he has never smoked. He has never used smokeless tobacco. He reports that he does not drink alcohol and does not use drugs.    Smoking History: never smoker  Exposure/Job History:  (indoor and outdoor), Grew up in [place]--immigrated here in 2. Did live in [place], [place]       Family History     Family History   Problem Relation Name Age of Onset    Ovarian cancer Mother         Surgical History   He has a past surgical history that includes Knee surgery (2013); Other surgical history (2020); Carpal tunnel release (2015); Colonoscopy (10/09/2013); Knee Arthroplasty (10/09/2013); Colonoscopy (2014); Total knee arthroplasty (2014); Knee surgery (10/11/2013); and Colonoscopy (2013).    Physical Exam     Vitals:    25 1626   BP: 130/82   Pulse: 61   Temp: 36.5 °C (97.7 °F)   SpO2: 94%       Physical Exam  Constitutional:       Appearance: Normal appearance.   HENT:      Head: Normocephalic and atraumatic.   Eyes:      Pupils: Pupils are equal, round, and reactive to light.   Cardiovascular:      Rate and Rhythm: Normal rate and regular rhythm.   Pulmonary:      Effort: Pulmonary effort is normal.      Breath sounds: Normal breath sounds.   Skin:     Findings: No rash.   Neurological:      General: No focal deficit present.      Mental Status: He is alert and oriented to person, place, and time.   Psychiatric:         Mood and Affect: Mood normal.            Results   Pulmonary Function Tests:  4/3/2024   FEV1/FVC: 78  post FEV1:  90% pred T% pred  RV/T% pred  DLCO: 82%  pred    Chest Radiograph:  XR chest 2 views 01/02/2024    Narrative  Interpreted By:  Zeina Uribe,  STUDY:  XR CHEST 2 VIEWS;    INDICATION:  Signs/Symptoms:Cough x a few weeks.    COMPARISON:  Chest radiograph dated 12/17/2020    ACCESSION NUMBER(S):  [id number]    ORDERING CLINICIAN:  MARA BRAVO    FINDINGS:  The cardiac silhouette size is diffusely enlarged. Bilateral lungs  demonstrate hyperinflation with coarse interstitial markings,  suggestive of chronic lung parenchymal changes. There is ill-defined  airspace opacity in the left lung base/retrocardiac region, new  compared to prior radiograph dating back to 2020. This is concerning  for pneumonia. Large pleural effusions or pneumothorax.    Impression  Findings suggestive of pneumonia in the retrocardiac region. An  underlying neoplastic process can not be completely excluded,  especially given background lung parenchymal changes. Recommend  follow-up radiograph in 3-6 weeks to demonstrate resolution.    Signed by: Zeina Uribe 1/3/2024 8:41 PM  Dictation workstation:   [id number]      Chest CT Scan:  1/26/2024  5mm cuts  IMPRESSION:  1.  Reticular and ground-glass opacities within the bilateral lower  lobes, left-greater-than-right, favored to reflect a combination of  fibrosis and atelectasis. Otherwise, no acute cardiopulmonary process.  2. There are a few subcentimeter pulmonary nodules measuring up to  0.7 cm as described above. Recommend short interval CT chest  follow-up in 3 months for re-evaluation.  3. Scattered pleural calcifications of the bilateral lungs, correlate  with history of asbestos exposure.  4. Aneurysmal dilatation of the ascending thoracic aorta measuring up  to 4.4 cm in diameter.  5. Mild cardiomegaly.    HRCT  4/24/2024  Narrative & Impression   Interpreted By:  John Costa,   STUDY:  CT CHEST HIGH RESOLUTION;  4/24/2024 8:27 am      INDICATION:  Signs/Symptoms:follow up lung nodules and reticular  changes, 1mm  cuts, prone and supine, expiratory films.      COMPARISON:  None.      ACCESSION NUMBER(S):  [id number]      ORDERING CLINICIAN:  ELIECER HERNANDEZ      TECHNIQUE:  Using helical multidetector technique, volumetric data acquisition of  the chest was obtained without intravenous administration of contrast  material under the high resolution chest CT protocol. Examination  includes contiguous slices through the chest in supine positioning  during inspiration, noncontiguous axial slices in supine positioning  during expiration and prone positioning during inspiration.      FINDINGS:  LUNGS AND AIRWAYS:  The trachea and central airways are patent. No endobronchial lesion  is seen.      There is trace bilateral pleural effusion.      Scattered bilateral calcified pleural plaques.      There is interlobular septal thickening and faint ground-glass  opacity predominantly in the lower lobes, left more than right.      There is mild subpleural reticulation with no evidence of  bronchiectasis or bronchiolectasis. No honeycombing.      Mild air trapping in the expiratory phase images.      6 mm nodular density along the left major fissure, image 163/370,  likely a lymph node and unchanged compared to prior study.      7 mm left lower lobe nodular density, image 260/370, not definitely  seen on the prior study.      MEDIASTINUM AND EDGAR, LOWER NECK AND AXILLA:  The visualized thyroid gland is within normal limits.  No evidence of thoracic lymphadenopathy by CT criteria.  Esophagus appears within normal limits as seen.      HEART AND VESSELS:  Mildly dilated ascending thoracic aorta measuring 4.2 cm.There is  mild scattered calcified atherosclerosis present. Slightly dilated  main pulmonary artery measuring 3.3 cm. Mild coronary artery  calcifications are seen.Please note,the study is not optimized for  evaluation of coronary arteries. Heart size is enlarged mainly due to  biatrial enlargement, right more than left.  There is no pericardial  effusion seen.      UPPER ABDOMEN:  2.1 cm hypodense lesion in the right kidney, likely a renal cyst.              CHEST WALL AND OSSEOUS STRUCTURES:  Chest wall is within normal limits.  No acute osseous pathology.There are no suspicious osseous lesions.      Mild compression deformity of T1, T7, T8 and T12 vertebral bodies are  stable. Mild diffuse osteopenia.      IMPRESSION:  1. Mild subpleural reticulation with no evidence of bronchiectasis or  bronchiolectasis and no evidence of honeycombing. Findings may  represent mild nonspecific fibrosis in a pattern inconsistent with  UIP.  2. Interlobular septal thickening and ground-glass opacity  predominantly in the lower lobes and left more than right, not  significantly different. Findings may be due to component of  interstitial pulmonary edema. There is also trace bilateral pleural  effusion, new compared to prior study. Mild multifocal air trapping  which can be due to component of airway disease.  3. There is 7 mm left lower lobe pulmonary nodule, not definitely  seen on the prior study and might be a focal area of atelectasis.  Recommend however short-term follow-up chest CT in 3-6 months for  continued surveillance.  4. Multiple bilateral calcified pleural plaques raising concern for  prior asbestos exposure.  5. Cardiomegaly mainly due to biatrial enlargement with mild coronary  artery calcification. Slightly dilated main pulmonary artery which  can be seen in pulmonary artery hypertension.  6. Mildly dilated ascending thoracic aorta measuring 4.2 cm.  7. Other stable findings as described above.       ECHO:  No results found for this or any previous visit from the past 365 days.       Labs:  Lab Results   Component Value Date    WBC 6.1 09/23/2024    HGB 13.7 09/23/2024    HCT 40.8 (L) 09/23/2024    MCV 93 09/23/2024     09/23/2024       Lab Results   Component Value Date    CREATININE 1.27 09/23/2024    BUN 25 (H) 09/23/2024  "    09/23/2024    K 4.3 09/23/2024     09/23/2024    CO2 24 09/23/2024        Lab Results   Component Value Date    SEDRATE 30 (H) 10/19/2022        IgE: No results found for: \"IGE\"   Respiratory Allergy Panel:  AEC:   Eosinophils Absolute (x10*3/uL)   Date Value   09/23/2024 0.23     Eosinophils % (%)   Date Value   09/23/2024 3.8       Cultures:  AFB Culture   Date Value Ref Range Status   09/16/2024 No Mycobacteria isolated.  Final      No results found for: \"RESPCULTCYFI\"    No results found for the last 90 days.    C     Assessment and Plan       Basilar reticular changes vs atelectasis  L> R ; evaluation is limited given low resolution of the scan (5mm cuts) --ATTR unusual to manifest in the lungs but can be possible ( can affect the interstitium, tracheobronchial involvement is also possible).  No other obvious exposures or other risk factors of ILD.   HRCT from  is showing mild subpleural reticulations--PFTs do not show obstruction or restriction,very mild hyperinflation, normal dlco; additionally there is dependent interlobular septal thickening which likely is related to pulmonary edema   Pleural plaques --no known asbestos exposure; could be related to amyloidosis   Lung nodules 6-7mm   Night-time hemoptysis---no upper airway lesions or tracheobronchial lesions noted on ENT scope  and bronchoscopy;  BAL with lymphocytic predominance , cultures negative (bacterial, fungal, afb) ; may be related to pulmonary edema and being on xarelto after he is in supine position as this only happens in the morning or when he wakes up from sleeping (after laying flat).    KEILA        Recommendations  In the absence of any obvious airway lesion causing bleeding suspect this could be related to pulmonary edema in the setting of being on anticoagulation therapy, would be useful to see what his function is on most recent echo; afb and fungal cultures negative   Agree with humidifier, can add saline spray to keep " nasal passages moist  Will see if his hemoptysis improves after addition of CPAP, if not then will pursue CT angio to evaluate for any AVM in the lung (though his hemoptysis would be more intermittent throughout the day if this was the case)   Continue to follow with sleep medicine for his KEILA   PFTs are underwhelming and he has mild subpleural reticulations and pleural plaques--will continue to monitor clinically at this time --yearly merrill with dlco (will order)   Albuterol inh to help with clearance  RTC in 6 months or sooner     Heather Machado MD  01/09/2025

## 2025-01-09 NOTE — PATIENT INSTRUCTIONS
Thank you for visiting the Pulmonary Clinic today.   Your breathing medications: can use saline spray in the nose to help with dryness  Lets see how your coughing blood changes after you start the CPAP, if it doesn't help then we can do contrasted CT scan at the next visit   Tests: breathing test (call  to schedule) before the next visit  Return in 6 months   If you have questions or concerns, call [phone] (option 4)

## 2025-01-22 ENCOUNTER — APPOINTMENT (OUTPATIENT)
Dept: DERMATOLOGY | Facility: CLINIC | Age: 85
End: 2025-01-22
Payer: MEDICARE

## 2025-01-22 VITALS — SYSTOLIC BLOOD PRESSURE: 135 MMHG | DIASTOLIC BLOOD PRESSURE: 79 MMHG | HEART RATE: 50 BPM

## 2025-01-22 DIAGNOSIS — C44.611 BASAL CELL CARCINOMA (BCC) OF SKIN OF UPPER EXTREMITY INCLUDING SHOULDER, UNSPECIFIED LATERALITY: ICD-10-CM

## 2025-01-22 NOTE — PROGRESS NOTES
Office Visit Note  Date: 1/22/2025  Surgeon:  Rory Warren MD  Office Location: [address]  [facility] [address]  Dept: [phone]  Dept Fax: [phone]  Referring Provider: Scott Cummins MD  [id number]  [place] [address]    Subjective   Kerwin Stark is a 84 y.o. male who presents for the following: MOHS Surgery    According to the patient, the lesion has been present for approximately 6 months at the time of diagnosis.  The lesion is not causing symptoms.  The lesion has not been treated previously.    The patient does not have a pacemaker / defibrillator.  The patient does have a heart valve / joint replacement.    The patient is on blood thinners.  The patient does not have a history of hepatitis B or C.  The patient does not have a history of HIV.  The patient does not have a history of immunosuppression (e.g. organ transplantation, malignancy, medications)    Review of Systems:  No other skin or systemic complaints other than what is documented elsewhere in the note.    MEDICAL HISTORY: clinically relevant history including significant past medical history, medications and allergies was reviewed and documented in Epic.    Objective   Well appearing patient in no apparent distress; mood and affect are within normal limits.  Vital signs: See record.  Noted on the Right Forearm - Posterior  Is a 1.5 x 1.8 cm scar        The patient confirmed the identified site.    Discussion:  The nature of the diagnosis was explained. The lesion is a skin cancer.  It has a risk of local growth and distant spread. The condition is associated with sun exposure.  Warning signs of non-melanoma skin cancer discussed. Patient was instructed to perform monthly self skin examination.  We recommended that the patient have regular full skin exams given an increased risk of subsequent skin cancers. The patient was instructed to use sun  protective behaviors including use of broad spectrum sunscreens and sun protective clothing to reduce risk of skin cancers.      Risks, benefits, side effects of Mohs surgery were discussed with patient and the patient voiced understanding.  It was explained that even though the cure rate of Mohs is very high it is not 100%. Risks of surgery including but not limited to bleeding, infection, numbness, nerve damage, and scar were reviewed.  Discussion included wound care requirements, activity restrictions, likely scar outcome and time to heal.     After Mohs surgery, the defect may need to be repaired surgically and the scar may be longer than the original lesion.  Reconstruction options, risks, and benefits were reviewed including second intention healing, linear repair (4-1 ratio was explained), local flaps, skin grafts, cartilage grafts and interpolation flaps (the need for multiple surgeries was explained). Possible outcomes were reviewed including likely scar appearance, failure of flap survival, infection, bleeding and the need for revision surgery.     The pathology was reviewed, the photograph was reviewed, and the referring physician's note was reviewed.    Patient elected for Mohs surgery.    The patient has a basal cell carcinoma.  The pathology was reviewed, the photograph was reviewed, and the referring physicians note was reviewed.  Multiple treatment options including mohs surgery (which has moderate risk of morbidity) were reviewed.    Medical Decision Making  Column 1- Basal Cell Carcinoma (1 acute uncomplicated illness- Low)  Column 2- 3 tests reviewed (pathology, photograph, refering physician notes- Moderate)  Column 3- Modertate risk of morbidity from additional treatment- mohs surgery- Moderate)    Overall Moderate MDM

## 2025-01-22 NOTE — PROGRESS NOTES
Mohs Surgery Operative Note    Date of Surgery:  1/22/2025  Surgeon:  Rory Warren MD  Office Location: [address]   [address] [address]  Dept: [phone]  Dept Fax: [phone]  Referring Provider: Scott Cummins MD  [id number] [facility]  [place] [address]      Assessment/Plan   Pre-procedure:   Obtained informed consent: written from patient  The surgical site was identified and confirmed with the patient.     Intra-operative:   Audible time out called at : 8:41 AM 01/22/25  by: Evie Carrera RN   Verified patient name, birthdate, site, specimen bottle label & requisition.    The planned procedure(s) was again reviewed with the patient. The risks of bleeding, infection, nerve damage and scarring were reviewed. Written authorization was obtained. The patient identity, surgical site, and planned procedure(s) were verified. The provider acted as both surgeon and pathologist.     Basal cell carcinoma (BCC) of skin of upper extremity including shoulder, unspecified laterality  Right Forearm - Posterior    Mohs surgery    Consent obtained: written    Universal Protocol:  Procedure explained and questions answered to patient or proxy's satisfaction: Yes    Test results available and properly labeled: Yes    Pathology report reviewed: Yes    External notes reviewed: Yes    Photo or diagram used for site identification: Yes    Site/side marked: Yes    Slide independently reviewed by Mohs surgeon: Yes    Immediately prior to procedure a time out was called: Yes    Patient identity confirmed: verbally with patient  Preparation: Patient was prepped and draped in usual sterile fashion      Anticoagulation:  Is the patient taking prescription anticoagulant and/or aspirin prescribed/recommended by a physician? Yes    Was the anticoagulation regimen changed prior to Mohs? No      Anesthesia:  Anesthesia method: local  infiltration  Local anesthetic: lidocaine 1% WITH epi    Procedure Details:  Case ID Number: [id number]  Biopsy accession number: [id number]  Date of biopsy: 10/30/2024  Pre-Op diagnosis: basal cell carcinoma  BCC subtype: infiltrative and nodular  Surgical site (from skin exam): Right Forearm - Posterior  Pre-operative length (cm): 1.5  Pre-operative width (cm): 1.8  Indications for Mohs surgery: aggressive pathology (infiltrative)  Previously treated? No      Micrographic Surgery Details:  Post-operative length (cm): 1.8  Post-operative width (cm): 2.2  Number of Mohs stages: 1    Stage 1     Comments: The patient was brought into the operating room and placed in the procedure chair in the appropriate position.  The area positive by previous biopsy was identified and confirmed with the patient. The area of clinically obvious tumor was debulked using a curette and/or scalpel as needed. An incision was made following the Mohs approach through the skin. The specimen was taken to the lab, divided into 2 piece(s) and appropriately chromacoded and processed.         Tumor features identified on Mohs section: no tumor identified    Depth of defect: subcutaneous fat    Patient tolerance of procedure: tolerated well, no immediate complications    Reconstruction:  Was the defect reconstructed? Yes    Was reconstruction performed by the same Mohs surgeon? Yes    Setting of reconstruction: outpatient office  When was reconstruction performed? same day  Type of reconstruction: linear  Linear reconstruction: complex  Length of linear repair (cm): 5  Subcutaneous Layers (Deep Stitches)   Suture size:  4-0  Suture type:  Vicryl  Stitches:  Buried vertical mattress  Fine/surface layer approximation (top stitches)   Epidermal/Superficial suture size:  5-0  Epidermal/Superficial suture type:  Fast-absorbing gut  Stitches: simple running    Outcome: patient tolerated procedure well with no complications    Post-procedure details: sterile  dressing applied and wound care instructions given    Dressing type: pressure dressing          Complex Linear Repair - Wide Undermining:  Given the location and size of the defect, it was determined that a complex layered linear closure was required to restore normal anatomy and function. The repair was considered complex because extensive undermining was required and performed. The amount of undermining performed was greater than the maximum width of the defect as measured perpendicular to the closure line along at least one entire edge of the defect. Standing cutaneous cones were removed using Burow's triangles. The wound edges were brought into close approximation with buried vertical mattress sutures. The remainder of the wound was then closed with epidermal top sutures.      The final repair measured 5 cm              Wound care was discussed, and the patient was given written post-operative wound care instructions.      The patient will follow up with Rory Warren MD as needed for any post operative problems or concerns, and will follow up with their primary dermatologist as scheduled.

## 2025-01-24 ENCOUNTER — TELEPHONE (OUTPATIENT)
Dept: PRIMARY CARE | Facility: CLINIC | Age: 85
End: 2025-01-24
Payer: MEDICARE

## 2025-01-24 DIAGNOSIS — I43 CARDIAC AMYLOIDOSIS: ICD-10-CM

## 2025-01-24 DIAGNOSIS — E85.4 CARDIAC AMYLOIDOSIS: ICD-10-CM

## 2025-01-28 DIAGNOSIS — E85.4 CARDIAC AMYLOIDOSIS: ICD-10-CM

## 2025-01-28 DIAGNOSIS — I43 CARDIAC AMYLOIDOSIS: ICD-10-CM

## 2025-01-29 ENCOUNTER — APPOINTMENT (OUTPATIENT)
Dept: DERMATOLOGY | Facility: CLINIC | Age: 85
End: 2025-01-29
Payer: MEDICARE

## 2025-01-29 VITALS — SYSTOLIC BLOOD PRESSURE: 139 MMHG | DIASTOLIC BLOOD PRESSURE: 84 MMHG | HEART RATE: 63 BPM

## 2025-01-29 DIAGNOSIS — C44.719 BASAL CELL CARCINOMA (BCC) OF SKIN OF LEFT LOWER EXTREMITY INCLUDING HIP: ICD-10-CM

## 2025-01-29 PROCEDURE — 17314 MOHS ADDL STAGE T/A/L: CPT | Performed by: DERMATOLOGY

## 2025-01-29 PROCEDURE — 99214 OFFICE O/P EST MOD 30 MIN: CPT | Performed by: DERMATOLOGY

## 2025-01-29 PROCEDURE — 17313 MOHS 1 STAGE T/A/L: CPT | Performed by: DERMATOLOGY

## 2025-01-29 NOTE — PROGRESS NOTES
Mohs Surgery Operative Note    Date of Surgery:  1/29/2025  Surgeon:  Rory Warren MD  Office Location: [address]   [address] [address]  Dept: [phone]  Dept Fax: [phone]  Referring Provider: Scott Cummins MD  [id number] [facility]  [place] [address]      Assessment/Plan   Pre-procedure:   Obtained informed consent: written from patient  The surgical site was identified and confirmed with the patient.     Intra-operative:   Audible time out called at : 8:39AM 01/29/25  by: Nick Heath RN   Verified patient name, birthdate, site, specimen bottle label & requisition.    The planned procedure(s) was again reviewed with the patient. The risks of bleeding, infection, nerve damage and scarring were reviewed. Written authorization was obtained. The patient identity, surgical site, and planned procedure(s) were verified. The provider acted as both surgeon and pathologist.     Basal cell carcinoma (BCC) of skin of left lower extremity including hip  Left Lower Leg - Anterior  Mohs surgery  Consent obtained: written    Universal Protocol:  Procedure explained and questions answered to patient or proxy's satisfaction: Yes    Test results available and properly labeled: Yes    Pathology report reviewed: Yes    External notes reviewed: Yes    Photo or diagram used for site identification: Yes    Site/side marked: Yes    Slide independently reviewed by Mohs surgeon: Yes    Immediately prior to procedure a time out was called: Yes    Patient identity confirmed: verbally with patient  Preparation: Patient was prepped and draped in usual sterile fashion      Anticoagulation:  Is the patient taking prescription anticoagulant and/or aspirin prescribed/recommended by a physician? Yes    Was the anticoagulation regimen changed prior to Mohs? No      Anesthesia:  Anesthesia method: local infiltration  Local anesthetic: lidocaine 1%  WITH epi    Procedure Details:  Case ID Number: [id number]  Biopsy accession number: [id number]  Date of biopsy: 10/30/2024  Pre-Op diagnosis: basal cell carcinoma  BCC subtype: superficial  Surgical site (from skin exam): Left Lower Leg - Anterior  Pre-operative length (cm): 1.2  Pre-operative width (cm): 0.9  Indications for Mohs surgery: anatomic location where tissue conservation is critical  Previously treated? No      Micrographic Surgery Details:  Post-operative length (cm): 2.2  Post-operative width (cm): 1.6  Number of Mohs stages: 2    Stage 1  Comments: The patient was brought into the operating room and placed in the procedure chair in the appropriate position.  The area positive by previous biopsy was identified and confirmed with the patient. The area of clinically obvious tumor was debulked using a curette and/or scalpel as needed. An incision was made following the Mohs approach through the skin. The specimen was taken to the lab, divided into 2 piece(s) and appropriately chromacoded and processed.    Tumor was seen on the lateral margins as indicated on the on the Mohs map.  Superficial basal cell carcinoma. Histologic examination revealed small buds of atypical basaloid cells with peripheral palisading and tumoral clefting.    Tumor features identified on Mohs section: basal carcinoma   Depth of invasion: Epidermis    Stage 2  Comments: The area of positivity as noted on the Mohs map in the previous stage was identified and removed using the Mohs technique. The specimen was taken to the lab and appropriately chromacoded and processed in 1 piece(s).       Tumor features identified on Mohs section: no tumor identified  Depth of defect: subcutaneous fat  Patient tolerance of procedure: tolerated well, no immediate complications    Reconstruction:  Was the defect reconstructed?: No     Repair: After a discussion with the patient regarding the options for wound closure, a decision was made to proceed with  second intention healing.    Dressing/Follow-up: Surgifoam was placed in the wound. A pressure dressing was placed to help stabilize the wound and to minimize the risk of postoperative bleeding. Wound care was discussed, and the patient was given written post-operative wound care instructions.    Hemostasis achieved with: electrodesiccation  Outcome: patient tolerated procedure well with no complications    Post-procedure details: sterile dressing applied and wound care instructions given    Dressing type: Hypafix, pressure dressing, Telfa pad, petrolatum and Coban              Wound care was discussed, and the patient was given written post-operative wound care instructions.      The patient will follow up with Rory Warren MD as needed for any post operative problems or concerns, and will follow up with their primary dermatologist as scheduled.

## 2025-01-29 NOTE — PROGRESS NOTES
Office Visit Note  Date: 1/29/2025  Surgeon:  Rory Warren MD  Office Location:  [address]  [facility] [address]  Dept: [phone]  Dept Fax: [phone]  Referring Provider: Scott Cummins MD  [id number] [address]  Department of Dermatology  [place] [id number]    Subjective   Kerwin Stark is a 84 y.o. male who presents for the following: MOHS Surgery (BCC- Left Lower Leg - Anterior )    According to the patient, the lesion has been present for approximately 6 months at the time of diagnosis.  The lesion is not causing symptoms.  The lesion has not been treated previously.    The patient does not have a pacemaker / defibrillator.  The patient does have a heart valve / joint replacement.    The patient is on blood thinners.  The patient does not have a history of hepatitis B or C.  The patient does not have a history of HIV.  The patient does not have a history of immunosuppression (e.g. organ transplantation, malignancy, medications)    Review of Systems:  No other skin or systemic complaints other than what is documented elsewhere in the note.    MEDICAL HISTORY: clinically relevant history including significant past medical history, medications and allergies was reviewed and documented in Epic.    Objective   Well appearing patient in no apparent distress; mood and affect are within normal limits.  Vital signs: See record.  Noted on the Left Lower Leg - Anterior  Is a 1.2 x 0.9 cm scar      The patient confirmed the identified site.    Discussion:  The nature of the diagnosis was explained. The lesion is a skin cancer.  It has a risk of local growth and distant spread. The condition is associated with sun exposure.  Warning signs of non-melanoma skin cancer discussed. Patient was instructed to perform monthly self skin examination.  We recommended that the patient have regular full skin exams given an increased risk of subsequent skin cancers. The  patient was instructed to use sun protective behaviors including use of broad spectrum sunscreens and sun protective clothing to reduce risk of skin cancers.      Risks, benefits, side effects of Mohs surgery were discussed with patient and the patient voiced understanding.  It was explained that even though the cure rate of Mohs is very high it is not 100%. Risks of surgery including but not limited to bleeding, infection, numbness, nerve damage, and scar were reviewed.  Discussion included wound care requirements, activity restrictions, likely scar outcome and time to heal.     After Mohs surgery, the defect may need to be repaired surgically and the scar may be longer than the original lesion.  Reconstruction options, risks, and benefits were reviewed including second intention healing, linear repair (4-1 ratio was explained), local flaps, skin grafts, cartilage grafts and interpolation flaps (the need for multiple surgeries was explained). Possible outcomes were reviewed including likely scar appearance, failure of flap survival, infection, bleeding and the need for revision surgery.     The pathology was reviewed, the photograph was reviewed, and the referring physician's note was reviewed.    Patient elected for Mohs surgery.    The patient has a basal cell carcinoma.  The pathology was reviewed, the photograph was reviewed, and the referring physicians note was reviewed.  Multiple treatment options including mohs surgery (which has moderate risk of morbidity) were reviewed.    Medical Decision Making  Column 1- Basal Cell Carcinoma (1 acute uncomplicated illness- Low)  Column 2- 3 tests reviewed (pathology, photograph, refering physician notes- Moderate)  Column 3- Modertate risk of morbidity from additional treatment- mohs surgery- Moderate)    Overall Moderate MDM

## 2025-02-06 ENCOUNTER — APPOINTMENT (OUTPATIENT)
Dept: CARDIOLOGY | Facility: CLINIC | Age: 85
End: 2025-02-06
Payer: MEDICARE

## 2025-02-13 ENCOUNTER — OFFICE VISIT (OUTPATIENT)
Dept: CARDIOLOGY | Facility: CLINIC | Age: 85
End: 2025-02-13
Payer: MEDICARE

## 2025-02-13 VITALS
SYSTOLIC BLOOD PRESSURE: 114 MMHG | HEIGHT: 71 IN | WEIGHT: 166.5 LBS | HEART RATE: 49 BPM | BODY MASS INDEX: 23.31 KG/M2 | DIASTOLIC BLOOD PRESSURE: 63 MMHG | OXYGEN SATURATION: 95 %

## 2025-02-13 DIAGNOSIS — I48.91 ATRIAL FIBRILLATION, UNSPECIFIED TYPE (MULTI): Primary | ICD-10-CM

## 2025-02-13 LAB
ATRIAL RATE: 47 BPM
Q ONSET: 223 MS
QRS COUNT: 8 BEATS
QRS DURATION: 80 MS
QT INTERVAL: 512 MS
QTC CALCULATION(BAZETT): 471 MS
QTC FREDERICIA: 485 MS
R AXIS: 38 DEGREES
T AXIS: 42 DEGREES
T OFFSET: 479 MS
VENTRICULAR RATE: 51 BPM

## 2025-02-13 PROCEDURE — 1126F AMNT PAIN NOTED NONE PRSNT: CPT | Performed by: NURSE PRACTITIONER

## 2025-02-13 PROCEDURE — 99214 OFFICE O/P EST MOD 30 MIN: CPT | Performed by: NURSE PRACTITIONER

## 2025-02-13 PROCEDURE — 93005 ELECTROCARDIOGRAM TRACING: CPT | Performed by: NURSE PRACTITIONER

## 2025-02-13 PROCEDURE — 1160F RVW MEDS BY RX/DR IN RCRD: CPT | Performed by: NURSE PRACTITIONER

## 2025-02-13 PROCEDURE — 1159F MED LIST DOCD IN RCRD: CPT | Performed by: NURSE PRACTITIONER

## 2025-02-13 ASSESSMENT — ENCOUNTER SYMPTOMS
OCCASIONAL FEELINGS OF UNSTEADINESS: 0
LOSS OF SENSATION IN FEET: 0
DEPRESSION: 0

## 2025-02-13 ASSESSMENT — PAIN SCALES - GENERAL: PAINLEVEL_OUTOF10: 0-NO PAIN

## 2025-02-13 NOTE — PROGRESS NOTES
Chief Complaint:   6mo follow up EP     History Of Present Illness:    Kerwin Stark is a 84 y.o. male with PMH of HLD, BPH, ED, NL EF by Echo 3/2021, and persistent AF s/p AF/AFL ablation (PVI + CTI line) 2/17/21. AF recurred post ablation and pt felt tired w/ occas GOLDBERG but still was still able to golf. Had DCCV 4/27/21   but pt kept going into AT; thus Dr Ospina recommended Tikosyn initiation. Pt admits to GOLDBERG with walking uphill but not on flat ground. OAC is Xarelto.     Underwent Tikosyn loading at [facility] 6//21. Pt arrived from home in controlled AF (not on any AVN blockers). Was started on Tikosyn 500 mcg every 12 hours. QT prolongation was noted post 3rd dose thus Tikosyn was decreased to 250 mcg q12h. Pt underwent DCCV (single shock) resulting in SR then into A Tach with variable block and intermittent SR. Due to shortened QT on lower dose, Tikosyn was increased 1/2 step per Dr Ospina to 375mcg q12h; QTc remained stable. Pt had intermittent rate controlled AT 90bpm and SB/SR as well as intermittent Wenckebach conduction. Pt remained asymptomatic ambulating in [place] at least daily. Repeat DCCV was initially planned for 6/21 but pt had brief SB   with Wenckebach conduction the morning of 6/21 thus DCCV was canceled. Pt was back in controlled ATach 90s and   ambulated in [place], no issues, feels better than prior to admission.      Due to diffuse low voltage on EKG, hx NSVT in recovery during stress test in 2016 (no ischemia at submax HR), and hx carpal tunnel syndrome (though unilateral), pursued eval for amyloidosis as outpt (serum light chains drawn while inpt) and ordered nuclear med PYP scan in [facility] to be done as outpt. Free kappa light chains were elevated at 2.53 (NL to 1.94) with NL free lambda light chains; this is unlikely to represent AL amyloid. Discharged home in rates controlled AT.     7/15/21 1mo s/p tikosyn loading, ECG shows AT 99 bpm (reviewed by Anai). Pt asymptomatic in this and feels much  "better than when in AF. He remained in AT rates 85-95. During exertion rates trending 110-130 per Dr. Ospina we will continue with current regimen.      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  Currently following with HF Dr. Curran and Chucho for management of TTR Amyloid, on Vyndamax. Following with hem/onc Dr. Block for monitoring of blood work as well. ln October 2022 suffered a L elbow fracture and surgery, developed hematuria and has been following with Urology for BPH. Currently these issues have stabilized and he no longer is on BPH medications and no hematuria on Xarelto.     2/2023 Echo showed EF 50-55%, Dr. Del Rio feels he has HFpEF ( and started him on Jardiance)     Follows with [facility] Dr. Leticia Yuan for his amyloidosis now, he is in a study and receiving an injection(? placebo or actual drug). Sees him q 3mo. Also started on torsemide and feeling overall better breathing and no more LE edema.     8/2/23 routine 6mo Tikosyn follow up. He is feeling overall well and continues to walk daily. His ECG however shows SB 45 bpm w/ QTC of 583ms. We admitted him for observation while dc'd the tikoysn. His QTc came down over night and he was dc'd home the following day.   Feeling well at [address] [place] [address] follow up. ECG shows ACC junctional 74 bpm, small voltage, Qtc 475ms.     11/15/23 Feeling at baseline, staying active. Hrs 40-50 on apple watch, occasionally shows \"AF\" ECG in office shows likley slow AT as reviewed by Dr. Ospina.  Voltage is small, difficult to discern. No changes from EP standpoint at this time.         TODAY routine 6 mo follow up. Feeling overall in usual state of health. Staying active walking. Some days are better than others. Walking about 3-4miles daily weather permitting. THis daily Hrs are ranging 50s mostly. These do respond accordingly with exertion to about 110-130bpm.   He occasional notes that when he starts to walk at first or is on an incline he seems to feel a little winded at first " "then once start walking this subsides. BPs are stable. Denies any cp, sob, palpitations, LH/Dizziness, orthopnea, edema. bleeding.   Follows closely with Dr. Leticia Yuan at [facility] for his amyloid treatment and participating in study.  Sees them every 3mo.  ECG 2/13/25 AF 51 bpm very low voltage.  ECG 7/31/24 ? AT vs AF 51 bpm, very low voltage  ECG 2/14/24 ?AT 53 bpm vs AF, very low voltage. Qtc 453ms, Qrs narrow 72 ms       ROS:  Constitutional: not feeling poorly, no fever and no chills.   ENT: no nose bleeds  Cardiovascular: no chest pain, no tightness or heavy pressure, no shortness of breath, no palpitations, no lower extremity edema and as noted in HPI.   Respiratory: no cough, no shortness of breath during exertion, no PND, no orthopnea.   Gastrointestinal: no nausea, no vomiting, no melena  Genitourinary: no hematuria.   Musculoskeletal: no difficulty walking.   Neurological: no dizziness and no fainting.   Endocrine: no thyroid issues       Last Recorded Vitals:  Vitals:    02/13/25 0940   BP: 114/63   BP Location: Left arm   Patient Position: Sitting   Pulse: (!) 49   SpO2: 95%   Weight: 75.5 kg (166 lb 8 oz)   Height: 1.803 m (5' 11\")       Social History:  He reports that he has never smoked. He has never used smokeless tobacco. He reports that he does not drink alcohol and does not use drugs.    Family History:  Family History   Problem Relation Name Age of Onset    Ovarian cancer Mother          Allergies:  Patient has no known allergies.    Outpatient Medications:  Current Outpatient Medications   Medication Instructions    albuterol (ProAir HFA) 90 mcg/actuation inhaler 2 puffs, inhalation, Every 4 hours PRN    beta carotene (VITAMIN A) 10,000 Units, Every 72 hours    empagliflozin (JARDIANCE) 10 mg, Daily RT    rivaroxaban (XARELTO) 20 mg, oral, Daily, Take with food.    spironolactone (ALDACTONE) 25 mg    tafamidis (VYNDAMAX) 61 mg, oral, Daily    torsemide (DEMADEX) 10 mg, Daily       Physical " Exam:  Constitutional: alert and in no acute distress.   Eyes: no erythema, swelling or discharge from the eye .   Neck: neck is supple, symmetric, trachea midline, no masses .   Pulmonary: no increased work of breathing or signs of respiratory distress  and lungs clear to auscultation.    Cardiovascular: JVP was normal, no thrills , regular rhythm, normal S1 and S2, no murmurs , pedal pulses 2+ bilaterally  and no edema .   Abdomen: abdomen non-tender, no masses .   Skin: skin warm and dry, normal skin turgor .   Psychiatric judgment and insight is normal  and oriented to person, place and time .           Last Labs:  CBC -  Lab Results   Component Value Date    WBC 6.1 09/23/2024    HGB 13.7 09/23/2024    HCT 40.8 (L) 09/23/2024    MCV 93 09/23/2024     09/23/2024       CMP -  Lab Results   Component Value Date    CALCIUM 8.7 09/23/2024    PHOS 2.9 06/19/2021    PROT 6.8 09/23/2024    PROT 6.8 09/23/2024    ALBUMIN 3.7 09/23/2024    AST 21 09/23/2024    ALT 16 09/23/2024    ALKPHOS 98 09/23/2024    BILITOT 0.8 09/23/2024       LIPID PANEL -   Lab Results   Component Value Date    CHOL 164 09/23/2024    TRIG 92 09/23/2024    HDL 62.4 09/23/2024    CHHDL 2.6 09/23/2024    LDLF 80 06/15/2022    VLDL 18 09/23/2024    NHDL 102 09/23/2024       RENAL FUNCTION PANEL -   Lab Results   Component Value Date    GLUCOSE 82 09/23/2024     09/23/2024    K 4.3 09/23/2024     09/23/2024    CO2 24 09/23/2024    ANIONGAP 12 09/23/2024    BUN 25 (H) 09/23/2024    CREATININE 1.27 09/23/2024    GFRMALE 73 08/03/2023    CALCIUM 8.7 09/23/2024    PHOS 2.9 06/19/2021    ALBUMIN 3.7 09/23/2024            Last Cardiology Tests:  ECG:  ECG 11/15/23 AT 52 bpm   ECG 8/23/23 acc junction 74 bpm, voltage is very small, difficult to tell if actual p waves. QTc 475ms  ECG 8/2/23 SB 45bpm QTC 583ms (reviewed by myself and Dr. Larios)  ECG 1/27/23 AT 85 bpm QT// 502ms (on Tikoysn, reviewed by Anai)  ECG 7/5/22 AT  87 bpm w/ absolute QT 428ms/QTc 515 on Tikosyn 375mcg (reviewed by Dr. Ospina)  ECG 22 AT 95 bpm (Tikosyn 375) reviewed by Anai  ECG 7/15/21 AT 99 bpm QTc 513ms (Tikosyn 375) reviewed by Anai  ECG 21 AT 94 bpm QTC 505ms (Tikosyn 375) reviewed by Anai    Echo:  2023-CONCLUSIONS:   1. Left ventricular systolic function is low normal with a 50-55% estimated ejection fraction.   2. There is low normal right ventricular systolic function.   3. The left atrium is moderately dilated.   4. The right atrium is severely dilated.   5. Severity MR varies with R-R interval.   6. Moderate mitral valve regurgitation.   7. Mild to moderate tricuspid regurgitation visualized.   8. RVSP within normal limits.   9. The inferior vena cava appears severely dilated.    Echo 3/2021: EF 55-60% (in AF), no WMA, NL RV size/low NL RV fxn, severe   LAE/mod SUSHANT, mild MAC/mild MR, mild TR, rvsp 42.8mm.     .    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      Scores and Scales  [id number]   1. Heart Failure or EF is less than or equal to 35%? No (0 pt)   2. Hypertension? No (0 pt)   3. Age? Greater than or equal to 75 (2 pt)   4. Diabetes? No (0 pt)   5. Stroke, TIA, or Systemic Emboli? No (0 pt)   6. Vascular Disease? No (0 pt)   7. Gender? Male (0 pt)   Total Risk Score: The [id number] Score is 2 which corresponds to High Risk.       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 Follows for his amyloid at [facility] with Dr. Leticia Yuan q 3mo, he is enrolled in a study where he receiving injection medication every month (unclear if placebo or actual med).      23 routine 6mo Tikosyn follow up. ECG showed SB 45 bpm w/  QTC of 583ms. this was a significant change from prior ECGs, also of note he has received two doses of new study medication (unclear if placebo or not). We directly admitted him for observation while dc'd the tikoysn. His QTc came down over night and he was dc'd home the following day. No complications.  2wk follow up- ECG shows ?acc junction 74 bpm , voltage is very small, difficult to tell if actual p waves. QTc 475ms. Feeling well, staying active.  Then ECG 11/15/23 ? Slow AT 50s      TODAY 6 mo follow up. He is feeling overall well and capable of doing his usual activities. His daily Hrs are ranging in the 50s mostly. He is not symptomatic.  In general he is feeling well. Capable of doing all his usual actvities including golf and carrying his bag. Walks 3-4 miles daily.  BPs are stable.     ECG in office shows AF 51 bpm  We will hold the course for now since he seems to be feeling like this is managable and capable of staying active. He is anticoagulated to prevent stroke. He gets labs done with [facility] almost every 3mo with the HF team.    PLAN  Continue with current medications for now  Follow up with Dr. Yuan as scheduled , will have echo at [facility] in April  Follow up in 6mo or sooner if symptoms start to worsen or change       MAYLIN Quintero-CNP  Reviewed with collaborating physician Dr. Ospina

## 2025-04-30 ENCOUNTER — APPOINTMENT (OUTPATIENT)
Dept: DERMATOLOGY | Facility: CLINIC | Age: 85
End: 2025-04-30
Payer: MEDICARE

## 2025-04-30 DIAGNOSIS — Z85.820 PERSONAL HISTORY OF MALIGNANT MELANOMA OF SKIN: ICD-10-CM

## 2025-04-30 DIAGNOSIS — Z85.828 HISTORY OF NONMELANOMA SKIN CANCER: ICD-10-CM

## 2025-04-30 DIAGNOSIS — D22.9 MULTIPLE BENIGN NEVI: ICD-10-CM

## 2025-04-30 DIAGNOSIS — L82.1 SEBORRHEIC KERATOSIS: ICD-10-CM

## 2025-04-30 DIAGNOSIS — D48.5 NEOPLASM OF UNCERTAIN BEHAVIOR OF SKIN: ICD-10-CM

## 2025-04-30 DIAGNOSIS — L57.0 ACTINIC KERATOSIS: ICD-10-CM

## 2025-04-30 DIAGNOSIS — C44.319 BASAL CELL CARCINOMA (BCC) OF SKIN OF OTHER PART OF FACE: ICD-10-CM

## 2025-04-30 DIAGNOSIS — C44.712 BASAL CELL CARCINOMA (BCC) OF SKIN OF RIGHT LOWER EXTREMITY INCLUDING HIP: Primary | ICD-10-CM

## 2025-04-30 DIAGNOSIS — D18.01 HEMANGIOMA OF SKIN: ICD-10-CM

## 2025-04-30 DIAGNOSIS — L81.4 LENTIGO: ICD-10-CM

## 2025-04-30 ASSESSMENT — DERMATOLOGY PATIENT ASSESSMENT
DO YOU USE SUNSCREEN: OCCASIONALLY
ARE YOU AN ORGAN TRANSPLANT RECIPIENT: NO
DO YOU HAVE ANY NEW OR CHANGING LESIONS: NO
HAVE YOU HAD OR DO YOU HAVE VASCULAR DISEASE: NO
DO YOU USE A TANNING BED: NO
HAVE YOU HAD OR DO YOU HAVE A STAPH INFECTION: YES

## 2025-04-30 ASSESSMENT — PATIENT GLOBAL ASSESSMENT (PGA): PATIENT GLOBAL ASSESSMENT: PATIENT GLOBAL ASSESSMENT:  1 - CLEAR

## 2025-04-30 ASSESSMENT — DERMATOLOGY QUALITY OF LIFE (QOL) ASSESSMENT
WHAT SINGLE SKIN CONDITION LISTED BELOW IS THE PATIENT ANSWERING THE QUALITY-OF-LIFE ASSESSMENT QUESTIONS ABOUT: NONE OF THE ABOVE
DATE THE QUALITY-OF-LIFE ASSESSMENT WAS COMPLETED: 67325
RATE HOW EMOTIONALLY BOTHERED YOU ARE BY YOUR SKIN PROBLEM (FOR EXAMPLE, WORRY, EMBARRASSMENT, FRUSTRATION): 0 - NEVER BOTHERED
RATE HOW BOTHERED YOU ARE BY EFFECTS OF YOUR SKIN PROBLEMS ON YOUR ACTIVITIES (EG, GOING OUT, ACCOMPLISHING WHAT YOU WANT, WORK ACTIVITIES OR YOUR RELATIONSHIPS WITH OTHERS): 0 - NEVER BOTHERED
RATE HOW BOTHERED YOU ARE BY SYMPTOMS OF YOUR SKIN PROBLEM (EG, ITCHING, STINGING BURNING, HURTING OR SKIN IRRITATION): 0 - NEVER BOTHERED
ARE THERE EXCLUSIONS OR EXCEPTIONS FOR THE QUALITY OF LIFE ASSESSMENT: NO

## 2025-04-30 ASSESSMENT — ITCH NUMERIC RATING SCALE: HOW SEVERE IS YOUR ITCHING?: 0

## 2025-04-30 NOTE — Clinical Note
Cherry angioma(s)  - Discussed benign nature and that no treatment is necessary unless it becomes painful or increases in size. Patient opts for clinical monitoring at this time.

## 2025-04-30 NOTE — Clinical Note
Seborrheic keratosis (-es)  - Discussed benign nature and that no treatment is necessary unless it becomes painful or increases in size. Patient opts for clinical monitoring at this time.

## 2025-04-30 NOTE — Clinical Note
Solar Lentigines and photodamage.    - The clinically benign-appearing nature of these lesions and their relation to chronic sun exposure were discussed with the patient today and reassurance provided.  None of these lesions meet threshold for biopsy today, and thus no treatment is medically indicated for these lesions at this time.  The signs and symptoms of skin cancer were reviewed and the patient was advised to practice sun protection and sun avoidance, use daily sunscreen, and perform regular self skin exams.  The patient was instructed to monitor these lesions for any changes, such as in size, shape, or color, or associated symptoms and to call our office to schedule a return visit for re-evaluation if any such changes or symptoms are noticed in the future.

## 2025-04-30 NOTE — Clinical Note
Clinically benign- to slightly atypical-appearing nevi  - None of the patient's nevi meet threshold for biopsy today. We emphasized the importance of performing monthly self-skin exams using the ABCDs of monitoring moles, which were reviewed with the patient today. We also emphasized the importance of sun avoidance and sun protection with daily sunscreen use.

## 2025-04-30 NOTE — Clinical Note
History of lentigo maligna, nonmelanoma skin cancers, actinic keratoses, and photodamage.    - There is no evidence of local, regional, or distant recurrent disease or any new primary melanomas or nonmelanoma skin cancers on exam today.   - We emphasized the importance of continuing to perform monthly self-skin and lymph node exams using the ABCDs of monitoring moles, which were reviewed with the patient, as well as the importance of sun avoidance and sun protection with daily sunscreen use.   - RTC 6 months

## 2025-04-30 NOTE — Clinical Note
The sites of prior melanoma excision were examined.  There is no evidence of recurrent growth or pigmentation or recurrent disease, satellite papules, or nodules on exam today. On the patient's R anterior medial shin and L upper chest, there are well-healed scars with no evidence of recurrent growth on exam today.

## 2025-05-01 NOTE — PROGRESS NOTES
Subjective     Kerwin Stark is a 84 y.o. male who presents for the following: Skin Check (FBSE, no areas of concern today. HX of BCC, melanoma).     Intake Questions  Do you have any new or changing Lesions?: No  Are you an organ transplant recipient?: No  Have you had or do you have a Staph Infection?: Yes  Have you had or do you have Vacular Disease?: No  Do you use sunscreen?: Occasionally  Do you use a tanning bed?: No    Review of Systems:  No other skin or systemic complaints other than what is documented elsewhere in the note.    The following portions of the chart were reviewed this encounter and updated as appropriate:         Skin Cancer History  Biopsy Log Book  Biopsied Type Location Status   10/11/23 BCC Left lateral wrist Treatment Complete  6/21/24   10/30/24 BCC Right Forearm - Posterior Treatment Complete  1/22/25   10/30/24 BCC Left Lower Leg - Anterior Refer Mohs/Surgeon  1/29/25       Additional History      Specialty Problems          Dermatology Problems    Venous stasis ulcer with varicose veins of lower extremity    Actinic keratosis    Basal cell carcinoma of skin of other part of trunk    Basal cell carcinoma of skin of right upper limb, including shoulder    Carcinoma in situ of skin of other parts of face    Contusion of left thumb without damage to nail    Hemangioma of skin and subcutaneous tissue    Infected sebaceous cyst    Melanocytic nevi of left upper limb, including shoulder    Melanocytic nevi of trunk    Melanocytic nevi of unspecified lower limb, including hip    Melanoma in situ of left upper limb, including shoulder    Nail disorder, unspecified    Neoplasm of uncertain behavior of skin    Other melanin hyperpigmentation    Other seborrheic keratosis    Personal history of other malignant neoplasm of skin    Melanoma 1: Year Diagnosed: 2022. October. Location: Left Upper Arm Type: Lentigo Maligna Melanoma in situ Treatment(s): Mohs 1-11-23 Dr Warren [phone]     Melanoma  2: Year Diagnosed: 2023. January. Location: Left dorsal Superior Forearm Treatment(s): Mohs 03/07/2023 Type: Lentigo Maligna Melanoma     Lesion 1: Nodular Basal Cell Carcinoma. Year Diagnosed: 2017. Location: Left Upper Chest Treatment(s): ED&C.     Lesion 2: Actinic Keratosis. Year Diagnosed: 2021. October. Location: right temple Treatment(s): LN2 1/13/22 Pathology: [id number]     Lesion 3: AK with SCCISDeep margins involved. Lateral margin involved. Year Diagnosed: 2021. October. Location: Left inferior cheek Treatment(s): Mohs done by Ángel 1/13/22 Pathology: [id number]     Lesion 4: Superficial Basal Cell Carcinoma. Lateral margin involved. Year Diagnosed: 2023. January. Location: Right Anterior Medial Shin Treatment(s): Mohs 2/22/23 Pathology:          Scar condition and fibrosis of skin    Skin changes due to chronic exposure to nonionizing radiation, unspecified    Squamous cell carcinoma of skin of other parts of face    Basal cell neoplasm    Bruising    Contusion of left elbow        Objective   Well appearing patient in no apparent distress; mood and affect are within normal limits.    A full examination was performed including scalp, head, eyes, ears, nose, lips, neck, chest, axillae, abdomen, back, buttocks, bilateral upper extremities, bilateral lower extremities, hands, feet, fingers, toes, fingernails, and toenails. All findings within normal limits unless otherwise noted below.    Assessment/Plan   Skin Exam  1. MULTIPLE BENIGN NEVI  Generalized  Scattered, uniform and benign-appearing, regular brown melanocytic papules and macules.  Clinically benign- to slightly atypical-appearing nevi  - None of the patient's nevi meet threshold for biopsy today. We emphasized the importance of performing monthly self-skin exams using the ABCDs of monitoring moles, which were reviewed with the patient today. We also emphasized the importance of sun avoidance and sun protection with daily sunscreen  use.   2. NEOPLASM OF UNCERTAIN BEHAVIOR OF SKIN (2)  Right Malar Cheek  3 mm pink scaly papule    Lesion biopsy  Type of biopsy: tangential    Informed consent: discussed and consent obtained    Timeout: patient name, date of birth, surgical site, and procedure verified    Procedure prep:  Patient was prepped and draped  Anesthesia: the lesion was anesthetized in a standard fashion    Anesthetic:  1% lidocaine w/ epinephrine 1-100,000 local infiltration  Instrument used: DermaBlade    Hemostasis achieved with: aluminum chloride    Outcome: patient tolerated procedure well    Post-procedure details: sterile dressing applied and wound care instructions given    Dressing type: petrolatum and bandage    Specimen 1 - Dermatopathology- DERM LAB  Differential Diagnosis: AK vs BCC  Check Margins Yes/No?:    Comments:    Dermpath Lab: Routine Histopathology (formalin-fixed tissue)  right lateral calf  2 x 2.5 cm erythematous plaque with central ulceration    Lesion biopsy  Type of biopsy: tangential    Informed consent: discussed and consent obtained    Timeout: patient name, date of birth, surgical site, and procedure verified    Procedure prep:  Patient was prepped and draped  Anesthesia: the lesion was anesthetized in a standard fashion    Anesthetic:  1% lidocaine w/ epinephrine 1-100,000 local infiltration  Instrument used: DermaBlade    Hemostasis achieved with: aluminum chloride    Outcome: patient tolerated procedure well    Post-procedure details: sterile dressing applied and wound care instructions given    Dressing type: petrolatum and bandage    Specimen 2 - Dermatopathology- DERM LAB  Differential Diagnosis: SCC vs BCC vs trauma  Check Margins Yes/No?:    Comments:    Dermpath Lab: Routine Histopathology (formalin-fixed tissue)  3. ACTINIC KERATOSIS (10)  Left Buccal Cheek (2), Left Preauricular Area, Left Temple (2), Right Buccal Cheek, Right Ear, Right Malar Cheek, Right Parotid Area, Right Zygomatic  Area  Erythematous macules with gritty scale.  Destr of lesion - Left Buccal Cheek (2), Left Preauricular Area, Left Temple (2), Right Buccal Cheek, Right Ear, Right Malar Cheek, Right Parotid Area, Right Zygomatic Area  Complexity: simple    Destruction method: cryotherapy    Informed consent: discussed and consent obtained    Lesion destroyed using liquid nitrogen: Yes    Region frozen until ice ball extended beyond lesion: Yes    Cryotherapy cycles:  1  Outcome: patient tolerated procedure well with no complications    Post-procedure details: wound care instructions given    Related Procedures  Follow Up In Dermatology - Established Patient  4. HISTORY OF NONMELANOMA SKIN CANCER      Related Procedures  Follow Up In Dermatology - Established Patient  5. LENTIGO  Generalized  Scattered tan macules in sun-exposed areas.  Solar Lentigines and photodamage.    - The clinically benign-appearing nature of these lesions and their relation to chronic sun exposure were discussed with the patient today and reassurance provided.  None of these lesions meet threshold for biopsy today, and thus no treatment is medically indicated for these lesions at this time.  The signs and symptoms of skin cancer were reviewed and the patient was advised to practice sun protection and sun avoidance, use daily sunscreen, and perform regular self skin exams.  The patient was instructed to monitor these lesions for any changes, such as in size, shape, or color, or associated symptoms and to call our office to schedule a return visit for re-evaluation if any such changes or symptoms are noticed in the future.  6. HEMANGIOMA OF SKIN  Generalized  Scattered cherry-red papule(s).  Cherry angioma(s)  - Discussed benign nature and that no treatment is necessary unless it becomes painful or increases in size. Patient opts for clinical monitoring at this time.      7. SEBORRHEIC KERATOSIS  Generalized  Stuck on verrucous, tan-brown papules and  plaques.    Seborrheic keratosis (-es)  - Discussed benign nature and that no treatment is necessary unless it becomes painful or increases in size. Patient opts for clinical monitoring at this time.      8. PERSONAL HISTORY OF MALIGNANT MELANOMA OF SKIN  Left Upper Arm - Anterior  The sites of prior melanoma excision were examined.  There is no evidence of recurrent growth or pigmentation or recurrent disease, satellite papules, or nodules on exam today. On the patient's R anterior medial shin and L upper chest, there are well-healed scars with no evidence of recurrent growth on exam today.   History of lentigo maligna, nonmelanoma skin cancers, actinic keratoses, and photodamage.    - There is no evidence of local, regional, or distant recurrent disease or any new primary melanomas or nonmelanoma skin cancers on exam today.   - We emphasized the importance of continuing to perform monthly self-skin and lymph node exams using the ABCDs of monitoring moles, which were reviewed with the patient, as well as the importance of sun avoidance and sun protection with daily sunscreen use.   - RTC 6 months    Patient seen and discussed with Dr. Cummins,   Felecia Lyle MD MPH  PGY-2, Department of Dermatology    I saw and evaluated the patient. I personally obtained the key and critical portions of the history and physical exam or was physically present for key and critical portions performed by the resident/fellow. I reviewed the resident/fellow's documentation and discussed the patient with the resident/fellow. I agree with the resident/fellow's medical decision making as documented in the note and made changes where appropriate.

## 2025-05-07 ENCOUNTER — APPOINTMENT (OUTPATIENT)
Dept: RESPIRATORY THERAPY | Facility: HOSPITAL | Age: 85
End: 2025-05-07
Payer: MEDICARE

## 2025-05-29 ENCOUNTER — APPOINTMENT (OUTPATIENT)
Dept: PULMONOLOGY | Facility: CLINIC | Age: 85
End: 2025-05-29
Payer: MEDICARE

## 2025-06-15 NOTE — PROGRESS NOTES
[facility] Sleep [facility]  [facility]  [address]  [facility] [id number] [phone]     [facility]  Follow Up Visit Note      Subjective   Patient ID: Kerwin Stark is a 84 y.o. male with past medical history significant forA-Fib, VT, and OBSTRUCTIVE SLEEP APNEA .      6/23/2025: UPDATED: The patient is here {[id number]} and presents for follow-up of KEILA after new machine setup. His over 4 hours pap compliance rate was     %, and his ESS  is   today.      12/16/2024: The patient is here accompanied by wife who adds to history and was referred by Pulmonary Disease  Heather Machado MD for comprehensive sleep medicine evaluation due to sleep apnea recently diagnosed. Today, the patient returned to the clinic to review his sleep study to treat his sleep apnea. The desensitization strategy, 30-day free mask return policy, and insurance requirements are all discussed with the patient. The patient verbalized understanding it. His ESS is  today. His ESS: 4, DEBBIE: 6  today.     HPI  Patient had been having these symptoms for the past couples of months. Patient had sleep study done in the past but no available records.         SLEEP STUDY HISTORY: (personally reviewed raw data such as interpretation report, data sheet, hypnogram, and titration table if available and applicable)  10/9/24: Home Sleep Study:  AHI 3%: 49/hr, Supine AHI 3%: 52/hr, AHI 4%: 47.9/hr, Supine AHI 4%: 51/hr, Giovani: 76.1%, consider 5-20 CWP      SLEEP-WAKE SCHEDULE  Bedtime: 9 PM on weekdays, same on weekends  Subjective sleep latency: 10-25 minutes  Problems falling asleep: No  Number of awakenings: 3-4 times per night spontaneously for bleeding mucous   Falls back asleep in 5 minutes  Problems staying asleep: Yes  Final wake time: 6 AM  on weekdays, same on weekends  Shift work: N0  Naps: Yes, 30-45 minutes  Feels rested after a nap: Yes    Average sleep duration (excluding naps): 8-9 hours     SLEEP ENVIRONMENT  Sleep location: bed  Sleep status: sleeps with wife  Room is dark:  Yes  Room is quiet: Yes  Room is cool: Yes  Bed comfort: good     SLEEP HABITS:   Activities before bedtime: watch TV  Activities in bed: no  Preferred sleep position: back, and side     SLEEP ROS:  Night symptoms: POSITIVE for snoring, witnessed apnea, mouth breathing, and nocturnal cough  Morning symptoms: POSITIVE for unrefreshing sleep and morning dry mouth  Daytime symptoms POSITIVE for fatigue  Hypersomnia / narcolepsy symptoms: Patient denies symptoms of a hypersomnolence disorder such as sleep paralysis, sleep-related hallucinations, and cataplexy.   RLS symptoms: Patient denies RLS symptoms.  Movements in sleep: Patient denies problematic movements in sleep such as seizures during sleep, frequent leg kicks / jerks while asleep, sleep-related bruxism, and waking up with bedsheets in disarray.  Parasomnia symptoms: Denies     WEIGHT: stable     REVIEW OF SYSTEMS: All other systems have been reviewed and are negative.     PERTINENT SOCIAL HISTORY:  Occupation: retired  Smoking: No   ETOH: No   Marijuana: No   Caffeine: Yes, 2 cups of coffee in the morning, and afternoon.  Sleep aids: No   Claustrophobia: No      PERTINENT PAST SURGICAL HISTORY:  non-contributory     PERTINENT FAMILY HISTORY:  Patient denies family history of any sleep disorder.  Patient denies family history of sleep apnea.     Active Problems, Allergy List, Medication List, and PMH/PSH/FH/Social Hx have been reviewed and reconciled in chart. No significant changes unless documented in the pertinent chart section. Updates made when necessary.     REVIEW OF SYSTEMS  All other systems have been reviewed and are negative.      ALLERGIES  Allergies[1]    MEDICATIONS  Current Medications[2]          Objective       Physical Exam  Constitutional: Awake, not in distress  Lungs: Clear to auscultation  bilateral, no rales  Heart: Regular rate and rhythm, no murmurs  Skin: Warm, no rash  Neuro: No tremors, moves all extremities  Psych: alert and oriented to time, place, and person    Assessment/Plan   Kerwin Stark is a 84 y.o. male presents today in [facility] with the following problems:    # OBSTRUCTIVE SLEEP APNEA : SEVERE  -Start 5-15  cmH20 auto CPAP with mask fitting through [facility]. (Expedite it)  -Sleep apnea and PAP therapy education were provided at length in the clinic today. Kerwin Stark verbalized understanding.  -Emphasized diet, exercise, and weight loss in the clinic, as were non-supine sleep, avoiding alcohol in the late evening, and driving or operating heavy machinery when sleepy.  -Kerwin Fontanezlesterbalizes understanding of the above instructions and risks.     # CHRONIC SLEEP ONSET/ SLEEP MAINTENANCE INSOMNIA:  -likely due to poor sleep hygiene, and untreated sleep apnea.  -DEBBIE: 9  -Sleep hygiene discuss in the clinic.     # HYPERTENSION/ ATRIAL FIBRILLATION:  -Blood pressure was controlled today. To control the blood pressure better, instruct the patient to take anti-hypertension medication at bedtime and a water pill in the waking time.  -Denies headache, palpitation, and syncope in the clinic.  -Follows with PCP/ Cardiology     # NASAL CONGESTION:  -Instruct Kerwinmonica Lunsford use appropriate Flonase spray to ease congestion.     # XEROSTOMIA:  -Instruct Kerwin Lunsford purchase the Biotene gel to ease the dry mouth symptom,     RTC 1 month after receiving CPAP         All of patient's questions were answered. He verbalizes understanding and agreement with my assessment and plan.    Please excuse any errors in grammar or translation related to dictation.           [1] No Known Allergies  [2]   Current Outpatient Medications   Medication Sig Dispense Refill    albuterol (ProAir HFA) 90 mcg/actuation inhaler Inhale 2 puffs every 4 hours if needed  for wheezing or shortness of breath. (Patient not taking: Reported on 1/9/2025) 8.5 g 5    beta carotene (vitamin A) 3,000 mcg (10,000 unit) capsule Take 1 capsule (10,000 Units) by mouth every 3rd day.      empagliflozin (Jardiance) 10 mg Take 1 tablet (10 mg) by mouth once daily.      rivaroxaban (Xarelto) 20 mg tablet Take 1 tablet (20 mg) by mouth once daily. Take with food. 90 tablet 3    spironolactone (Aldactone) 25 mg tablet 1 tablet (25 mg).      tafamidis (Vyndamax) 61 mg capsule Take 1 capsule (61 mg) by mouth once daily. 30 capsule 11    torsemide (Demadex) 10 mg tablet Take 1 tablet (10 mg) by mouth once daily.       No current facility-administered medications for this visit.

## 2025-06-18 ENCOUNTER — APPOINTMENT (OUTPATIENT)
Dept: DERMATOLOGY | Facility: CLINIC | Age: 85
End: 2025-06-18
Payer: MEDICARE

## 2025-06-18 VITALS — DIASTOLIC BLOOD PRESSURE: 64 MMHG | SYSTOLIC BLOOD PRESSURE: 122 MMHG

## 2025-06-18 DIAGNOSIS — C44.329 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: ICD-10-CM

## 2025-06-18 PROCEDURE — 17311 MOHS 1 STAGE H/N/HF/G: CPT | Performed by: DERMATOLOGY

## 2025-06-18 PROCEDURE — 99214 OFFICE O/P EST MOD 30 MIN: CPT | Performed by: DERMATOLOGY

## 2025-06-18 PROCEDURE — 13132 CMPLX RPR F/C/C/M/N/AX/G/H/F: CPT | Performed by: DERMATOLOGY

## 2025-06-18 NOTE — PROGRESS NOTES
Mohs Surgery Operative Note    Date of Surgery:  6/18/2025  Surgeon:  Rory Warren MD  Office Location: [address]   [address] [address]  Dept: [phone]  Dept Fax: [phone]  Referring Provider: Scott Cummins MD  [id number] [facility]  Department [place] [address]      Assessment/Plan   Pre-procedure:   Obtained informed consent: written from patient  The surgical site was identified and confirmed with the patient.     Intra-operative:   Audible time out called at : 8:45AM 06/18/25  by: Carmita Moctezuma RN   Verified patient name, birthdate, site, specimen bottle label & requisition.    The planned procedure(s) was again reviewed with the patient. The risks of bleeding, infection, nerve damage and scarring were reviewed. Written authorization was obtained. The patient identity, surgical site, and planned procedure(s) were verified. The provider acted as both surgeon and pathologist.     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  Right Malar Cheek  Mohs surgery    Consent obtained: written    Universal Protocol:  Procedure explained and questions answered to patient or proxy's satisfaction: Yes    Test results available and properly labeled: Yes    Pathology report reviewed: Yes    External notes reviewed: Yes    Photo or diagram used for site identification: Yes    Site/side marked: Yes    Slide independently reviewed by Mohs surgeon: Yes    Immediately prior to procedure a time out was called: Yes    Patient identity confirmed: verbally with patient  Preparation: Patient was prepped and draped in usual sterile fashion      Anticoagulation:  Is the patient taking prescription anticoagulant and/or aspirin prescribed/recommended by a physician? Yes    Was the anticoagulation regimen changed prior to Mohs? No      Anesthesia:  Anesthesia method: local infiltration  Local anesthetic: lidocaine 1% WITH epi    Procedure Details:  Case ID  Number: [id number]  Biopsy accession number: [id number]  Date of biopsy: 4/30/2025  Frozen section biopsy performed: No    Specimen debulked: No    Pre-Op diagnosis: squamous cell carcinoma  SCC subtype: in situ  Surgical site (from skin exam): Right Malar Cheek  Pre-operative length (cm): 0.8  Pre-operative width (cm): 0.7  Indications for Mohs surgery: anatomic location where tissue conservation is critical  Previously treated? No      Micrographic Surgery Details:  Post-operative length (cm): 1.6  Post-operative width (cm): 0.9  Number of Mohs stages: 2    Stage 1     Comments: The patient was brought into the operating room and placed in the procedure chair in the appropriate position.  The area positive by previous biopsy was identified and confirmed with the patient. The area of clinically obvious tumor was debulked using a curette and/or scalpel as needed. An incision was made following the Mohs approach through the skin. The specimen was taken to the lab, divided into 2 piece(s) and appropriately chromacoded and processed.  Tumor was seen on the lateral margins as indicated on the on the Mohs map.  Squamous cell carcinoma in situ. Histologic examination revealed enlarged, atypical keratinocytes with large nuclear to cytoplasmic ratio extending throughout the full thickness of an epidermis.     Tumor features identified on Mohs section: Squamous Cell Carcinoma in Situ      Depth of invasion: Epidermis    Stage 2     Comments: The area of positivity as noted on the Mohs map in the previous stage was identified and removed using the Mohs technique. The specimen was taken to the lab and appropriately chromacoded and processed in 1 piece(s).       Tumor features identified on Mohs section: no tumor identified    Depth of defect: subcutaneous fat    Patient tolerance of procedure: tolerated well, no immediate complications    Reconstruction:  Was the defect reconstructed? Yes    Was reconstruction performed by the  same Mohs surgeon? Yes    Setting of reconstruction: outpatient office  When was reconstruction performed? same day  Type of reconstruction: linear  Linear reconstruction: complex  Length of linear repair (cm): 2.9  Subcutaneous Layers (Deep Stitches)   Suture size:  5-0  Suture type:  Vicryl  Stitches:  Buried vertical mattress  Fine/surface layer approximation (top stitches)   Epidermal/Superficial suture size:  5-0  Epidermal/Superficial suture type:  Fast-absorbing gut  Stitches: simple running    Hemostasis achieved with: electrodesiccation  Outcome: patient tolerated procedure well with no complications    Post-procedure details: sterile dressing applied and wound care instructions given    Dressing type: pressure dressing      Complex Linear Repair - Wide Undermining:  Given the location and size of the defect, it was determined that a complex layered linear closure was required to restore normal anatomy and function. The repair was considered complex because extensive undermining was required and performed. The amount of undermining performed was greater than the maximum width of the defect as measured perpendicular to the closure line along at least one entire edge of the defect. Standing cutaneous cones were removed using Burow's triangles. The wound edges were brought into close approximation with buried vertical mattress sutures. The remainder of the wound was then closed with epidermal top sutures.        The final repair measured 2.9 cm                Wound care was discussed, and the patient was given written post-operative wound care instructions.      The patient will follow up with Rory Warren MD as needed for any post operative problems or concerns, and will follow up with their primary dermatologist as scheduled.       Carmita GAGE RN  am scribing for, and in the presence of Rory Warren MD

## 2025-06-18 NOTE — PROGRESS NOTES
Office Visit Note  Date: 6/18/2025  Surgeon:  Rory Warren MD  Office Location: [address]  [facility] [address]  Dept: [phone]  Dept Fax: [phone]  Referring Provider: Scott Cummins MD  [id number]  [place] [address]    Subjective   Kerwin Stark is a 84 y.o. male who presents for the following: MOHS Surgery    According to the patient, the lesion has been present for approximately 1 month at the time of diagnosis.  The lesion is not causing symptoms.  The lesion has not been treated previously.    The patient does not have a pacemaker / defibrillator.  The patient does have a heart valve / joint replacement.    The patient is on blood thinners.  The patient does not have a history of hepatitis B or C.  The patient does not have a history of HIV.  The patient does not have a history of immunosuppression (e.g. organ transplantation, malignancy, medications)    Review of Systems:  No other skin or systemic complaints other than what is documented elsewhere in the note.    MEDICAL HISTORY: clinically relevant history including significant past medical history, medications and allergies was reviewed and documented in Epic.    Objective   Well appearing patient in no apparent distress; mood and affect are within normal limits.  Vital signs: See record.  Noted on the   Right Malar Cheek  Is a 0.8 x 0.7 cm scar    The patient confirmed the identified site.    Discussion:  The nature of the diagnosis was explained. The lesion is a skin cancer.  It has a risk of local growth and distant spread. The condition is associated with sun exposure.  Warning signs of non-melanoma skin cancer discussed. Patient was instructed to perform monthly self skin examination.  We recommended that the patient have regular full skin exams given an increased risk of subsequent skin cancers. The patient was instructed to use sun protective  behaviors including use of broad spectrum sunscreens and sun protective clothing to reduce risk of skin cancers.      Risks, benefits, side effects of Mohs surgery were discussed with patient and the patient voiced understanding.  It was explained that even though the cure rate of Mohs is very high it is not 100%. Risks of surgery including but not limited to bleeding, infection, numbness, nerve damage, and scar were reviewed.  Discussion included wound care requirements, activity restrictions, likely scar outcome and time to heal.     After Mohs surgery, the defect may need to be repaired surgically and the scar may be longer than the original lesion.  Reconstruction options, risks, and benefits were reviewed including second intention healing, linear repair (4-1 ratio was explained), local flaps, skin grafts, cartilage grafts and interpolation flaps (the need for multiple surgeries was explained). Possible outcomes were reviewed including likely scar appearance, failure of flap survival, infection, bleeding and the need for revision surgery.     The pathology was reviewed, the photograph was reviewed, and the referring physician's note was reviewed.    Patient elected for Mohs surgery.  The patient has a squamous cell carcinoma.  The pathology was reviewed, the photograph was reviewed, and the referring physicians note was reviewed.  Multiple treatment options including mohs surgery (which has moderate risk of morbidity) were reviewed.    Medical Decision Making  Column 1- Squamous Cell Carcinoma (1 acute uncomplicated illness- Low)  Column 2- 3 tests reviewed (pathology, photograph, referring physician notes- Moderate)  Column 3- Modertate risk of morbidity from additional treatment- mohs surgry- Moderate)    Overall Moderate LUCAS      I, Carmita Moctezuma RN   am scribing for, and in the presence of Rory Warren MD

## 2025-06-23 ENCOUNTER — APPOINTMENT (OUTPATIENT)
Dept: SLEEP MEDICINE | Facility: HOSPITAL | Age: 85
End: 2025-06-23
Payer: MEDICARE

## 2025-06-24 NOTE — TELEPHONE ENCOUNTER
Patient questioning why he needed to have another chest CT.  This nurse educated the patient that since his chest CT in 2/2024 showed a new pulmonary nodule, it is recommended for the patient to have another chest CT 3-6 months later to track if there is any progression.  Patient expressed understanding.  
VITAL SIGNS: I have reviewed nursing notes and confirm.  CONSTITUTIONAL: Well-developed; well-nourished; in no acute distress.  SKIN: Skin exam is warm and dry, no acute rash.  HEAD: Normocephalic; atraumatic.  EYES: PERRL, EOM intact; conjunctiva and sclera clear.  ENT: No nasal discharge; airway clear.   NECK/BACK: Supple; non tender, no midline CTL spine ttp or step off  CARD: S1, S2 normal; no murmurs, gallops, or rubs. Regular rate and rhythm.  RESP: No wheezes, rales or rhonchi.  ABD: Normal bowel sounds; soft; non-distended; non-tender  EXT: Normal ROM, good pulses  NEURO: Alert, oriented. Grossly unremarkable. No focal deficits.  PSYCH: Cooperative, appropriate, tearful but consolable, not internally preoccupied, no apparent psychosis

## 2025-06-25 ENCOUNTER — APPOINTMENT (OUTPATIENT)
Dept: DERMATOLOGY | Facility: CLINIC | Age: 85
End: 2025-06-25
Payer: MEDICARE

## 2025-06-25 VITALS — HEART RATE: 50 BPM | SYSTOLIC BLOOD PRESSURE: 124 MMHG | DIASTOLIC BLOOD PRESSURE: 77 MMHG

## 2025-06-25 DIAGNOSIS — C44.712 BASAL CELL CARCINOMA (BCC) OF SKIN OF RIGHT LOWER EXTREMITY INCLUDING HIP: ICD-10-CM

## 2025-06-25 PROCEDURE — 99214 OFFICE O/P EST MOD 30 MIN: CPT | Performed by: DERMATOLOGY

## 2025-06-25 PROCEDURE — 17313 MOHS 1 STAGE T/A/L: CPT | Performed by: DERMATOLOGY

## 2025-06-25 NOTE — PROGRESS NOTES
Office Visit Note  Date: 6/25/2025  Surgeon:  Rory Warren MD  Office Location: [address]  [facility] [address]  Dept: [phone]  Dept Fax: [phone]  Referring Provider: Scott Cummins MD  [id number]  Department of Dermatology  [place] [id number]    Subjective   Kerwin Stark is a 84 y.o. male who presents for the following: MOHS Surgery (Right Lateral Calf)    According to the patient, the lesion has been present for approximately greater than 1 year at the time of diagnosis.  The lesion is not causing symptoms.  The lesion has not been treated previously.    The patient does not have a pacemaker / defibrillator.  The patient does have a heart valve / joint replacement.    The patient is on blood thinners.  The patient does not have a history of hepatitis B or C.  The patient does not have a history of HIV.  The patient does not have a history of immunosuppression (e.g. organ transplantation, malignancy, medications)    Review of Systems:  No other skin or systemic complaints other than what is documented elsewhere in the note.    MEDICAL HISTORY: clinically relevant history including significant past medical history, medications and allergies was reviewed and documented in Epic.    Objective   Well appearing patient in no apparent distress; mood and affect are within normal limits.  Vital signs: See record.  Noted on the   Right Lateral Calf  Is a 3.2 x 2.2 cm scar    The patient confirmed the identified site.    Discussion:  The nature of the diagnosis was explained. The lesion is a skin cancer.  It has a risk of local growth and distant spread. The condition is associated with sun exposure.  Warning signs of non-melanoma skin cancer discussed. Patient was instructed to perform monthly self skin examination.  We recommended that the patient have regular full skin exams given an increased risk of subsequent skin cancers. The patient was  instructed to use sun protective behaviors including use of broad spectrum sunscreens and sun protective clothing to reduce risk of skin cancers.      Risks, benefits, side effects of Mohs surgery were discussed with patient and the patient voiced understanding.  It was explained that even though the cure rate of Mohs is very high it is not 100%. Risks of surgery including but not limited to bleeding, infection, numbness, nerve damage, and scar were reviewed.  Discussion included wound care requirements, activity restrictions, likely scar outcome and time to heal.     After Mohs surgery, the defect may need to be repaired surgically and the scar may be longer than the original lesion.  Reconstruction options, risks, and benefits were reviewed including second intention healing, linear repair (4-1 ratio was explained), local flaps, skin grafts, cartilage grafts and interpolation flaps (the need for multiple surgeries was explained). Possible outcomes were reviewed including likely scar appearance, failure of flap survival, infection, bleeding and the need for revision surgery.     The pathology was reviewed, the photograph was reviewed, and the referring physician's note was reviewed.    Patient elected for Mohs surgery.  The patient has a basal cell carcinoma.  The pathology was reviewed, the photograph was reviewed, and the referring physicians note was reviewed.  Multiple treatment options including mohs surgery (which has moderate risk of morbidity) were reviewed.    Medical Decision Making  Column 1- Basal Cell Carcinoma (1 acute uncomplicated illness- Low)  Column 2- 3 tests reviewed (pathology, photograph, refering physician notes- Moderate)  Column 3- Modertate risk of morbidity from additional treatment- mohs surgery- Moderate)    Overall Moderate MDM     I, Sera Horan RN  am scribing for, and in the presence of Rory Warren MD

## 2025-06-25 NOTE — PROGRESS NOTES
Mohs Surgery Operative Note    Date of Surgery:  6/25/2025  Surgeon:  Rory Warren MD  Office Location: [address]   [address] [address]  Dept: [phone]  Dept Fax: [phone]  Referring Provider: Scott Cummins MD  [id number]  [place] [address]      Assessment/Plan   Pre-procedure:   Obtained informed consent: written from patient  The surgical site was identified and confirmed with the patient.     Intra-operative:   Audible time out called at : 08:49 AM 06/25/25  by: GIL JEFF RN   Verified patient name, birthdate, site, specimen bottle label & requisition.    The planned procedure(s) was again reviewed with the patient. The risks of bleeding, infection, nerve damage and scarring were reviewed. Written authorization was obtained. The patient identity, surgical site, and planned procedure(s) were verified. The provider acted as both surgeon and pathologist.     BASAL CELL CARCINOMA (BCC) OF SKIN OF RIGHT LOWER EXTREMITY INCLUDING HIP  Right Lateral Calf  Mohs surgery    Consent obtained: written    Universal Protocol:  Procedure explained and questions answered to patient or proxy's satisfaction: Yes    Test results available and properly labeled: Yes    Pathology report reviewed: Yes    External notes reviewed: Yes    Photo or diagram used for site identification: Yes    Site/side marked: Yes    Slide independently reviewed by Mohs surgeon: Yes    Immediately prior to procedure a time out was called: Yes    Patient identity confirmed: verbally with patient  Preparation: Patient was prepped and draped in usual sterile fashion      Anticoagulation:  Is the patient taking prescription anticoagulant and/or aspirin prescribed/recommended by a physician? Yes    Was the anticoagulation regimen changed prior to Mohs? No      Anesthesia:  Anesthesia method: local infiltration  Local anesthetic: lidocaine 1%  WITH epi    Procedure Details:  Case ID Number: [id number]  Biopsy accession number: [id number]  Date of biopsy: 4/30/2025  Pre-Op diagnosis: basal cell carcinoma  BCC subtype: infiltrative and nodular  Surgical site (from skin exam): Right Lateral Calf  Pre-operative length (cm): 3.2  Pre-operative width (cm): 2.2  Indications for Mohs surgery: tumor size greater than 2 cm  Previously treated? No      Micrographic Surgery Details:  Post-operative length (cm): 4.2  Post-operative width (cm): 2.8  Number of Mohs stages: 1    Stage 1     Comments: The patient was brought into the operating room and placed in the procedure chair in the appropriate position.  The area positive by previous biopsy was identified and confirmed with the patient. The area of clinically obvious tumor was debulked using a curette and/or scalpel as needed. An incision was made following the Mohs approach through the skin. The specimen was taken to the lab, divided into 2 piece(s) and appropriately chromacoded and processed.                 Tumor features identified on Mohs section: no tumor identified    Depth of defect: subcutaneous fat    Patient tolerance of procedure: tolerated well, no immediate complications    Reconstruction:  Was the defect reconstructed?: No     Repair: After a discussion with the patient regarding the options for wound closure, a decision was made to proceed with second intention healing.    Dressing/Follow-up: Surgifoam was placed in the wound. A pressure dressing was placed to help stabilize the wound and to minimize the risk of postoperative bleeding. Wound care was discussed, and the patient was given written post-operative wound care instructions.        Fine/surface layer approximation (top stitches)   Hemostasis achieved with: electrodesiccation  Outcome: patient tolerated procedure well with no complications    Post-procedure details: sterile dressing applied and wound care instructions given    Dressing type:  pressure dressing, Coban and Gelfoam                Wound care was discussed, and the patient was given written post-operative wound care instructions.      The patient will follow up with Rory Warren MD as needed for any post operative problems or concerns, and will follow up with their primary dermatologist as scheduled.       ISera RN  am scribing for, and in the presence of Rory Warren MD

## 2025-08-13 ENCOUNTER — APPOINTMENT (OUTPATIENT)
Dept: CARDIOLOGY | Facility: CLINIC | Age: 85
End: 2025-08-13
Payer: MEDICARE

## 2025-08-20 ENCOUNTER — APPOINTMENT (OUTPATIENT)
Dept: CARDIOLOGY | Facility: CLINIC | Age: 85
End: 2025-08-20
Payer: MEDICARE

## 2025-08-27 ENCOUNTER — OFFICE VISIT (OUTPATIENT)
Dept: CARDIOLOGY | Facility: CLINIC | Age: 85
End: 2025-08-27
Payer: MEDICARE

## 2025-08-27 VITALS
OXYGEN SATURATION: 93 % | HEART RATE: 54 BPM | HEIGHT: 71 IN | SYSTOLIC BLOOD PRESSURE: 110 MMHG | DIASTOLIC BLOOD PRESSURE: 67 MMHG | WEIGHT: 174.3 LBS | BODY MASS INDEX: 24.4 KG/M2

## 2025-08-27 DIAGNOSIS — I48.91 ATRIAL FIBRILLATION, UNSPECIFIED TYPE (MULTI): Primary | ICD-10-CM

## 2025-08-27 LAB
ATRIAL RATE: 41 BPM
Q ONSET: 226 MS
QRS COUNT: 9 BEATS
QRS DURATION: 70 MS
QT INTERVAL: 488 MS
QTC CALCULATION(BAZETT): 462 MS
QTC FREDERICIA: 470 MS
R AXIS: 95 DEGREES
T AXIS: 84 DEGREES
T OFFSET: 470 MS
VENTRICULAR RATE: 54 BPM

## 2025-08-27 PROCEDURE — 1126F AMNT PAIN NOTED NONE PRSNT: CPT | Performed by: NURSE PRACTITIONER

## 2025-08-27 PROCEDURE — 99212 OFFICE O/P EST SF 10 MIN: CPT | Performed by: NURSE PRACTITIONER

## 2025-08-27 PROCEDURE — 99214 OFFICE O/P EST MOD 30 MIN: CPT | Performed by: NURSE PRACTITIONER

## 2025-08-27 PROCEDURE — 93005 ELECTROCARDIOGRAM TRACING: CPT | Performed by: NURSE PRACTITIONER

## 2025-08-27 PROCEDURE — 1159F MED LIST DOCD IN RCRD: CPT | Performed by: NURSE PRACTITIONER

## 2025-08-27 PROCEDURE — 1036F TOBACCO NON-USER: CPT | Performed by: NURSE PRACTITIONER

## 2025-08-27 PROCEDURE — 1160F RVW MEDS BY RX/DR IN RCRD: CPT | Performed by: NURSE PRACTITIONER

## 2025-08-27 ASSESSMENT — ENCOUNTER SYMPTOMS
DEPRESSION: 0
OCCASIONAL FEELINGS OF UNSTEADINESS: 0
LOSS OF SENSATION IN FEET: 0

## 2025-08-27 ASSESSMENT — PAIN SCALES - GENERAL: PAINLEVEL_OUTOF10: 0-NO PAIN

## 2025-09-08 ENCOUNTER — APPOINTMENT (OUTPATIENT)
Dept: PRIMARY CARE | Facility: CLINIC | Age: 85
End: 2025-09-08
Payer: MEDICARE

## 2025-09-22 ENCOUNTER — APPOINTMENT (OUTPATIENT)
Dept: RESPIRATORY THERAPY | Facility: HOSPITAL | Age: 85
End: 2025-09-22
Payer: MEDICARE

## 2025-10-08 ENCOUNTER — APPOINTMENT (OUTPATIENT)
Dept: UROLOGY | Facility: CLINIC | Age: 85
End: 2025-10-08
Payer: MEDICARE

## 2025-11-05 ENCOUNTER — APPOINTMENT (OUTPATIENT)
Dept: DERMATOLOGY | Facility: CLINIC | Age: 85
End: 2025-11-05
Payer: MEDICARE